# Patient Record
Sex: MALE | Race: WHITE | NOT HISPANIC OR LATINO | Employment: FULL TIME | URBAN - METROPOLITAN AREA
[De-identification: names, ages, dates, MRNs, and addresses within clinical notes are randomized per-mention and may not be internally consistent; named-entity substitution may affect disease eponyms.]

---

## 2017-03-12 DIAGNOSIS — F33.0 MAJOR DEPRESSIVE DISORDER, RECURRENT EPISODE, MILD (H): ICD-10-CM

## 2017-03-14 DIAGNOSIS — F33.0 MAJOR DEPRESSIVE DISORDER, RECURRENT EPISODE, MILD (H): ICD-10-CM

## 2017-03-14 NOTE — TELEPHONE ENCOUNTER
Request for medication refill:    Date of last visit at clinic: 2/25/16    Please complete refill if appropriate and CLOSE ENCOUNTER.    Closing the encounter signifies the refill is complete.    If refill has been denied, please complete the smart phrase .smirefuse and route it to the [facility] RN TRIAGE pool to inform the patient and the pharmacy.    Elo Dickson

## 2017-04-12 ENCOUNTER — OFFICE VISIT (OUTPATIENT)
Dept: FAMILY MEDICINE | Facility: CLINIC | Age: 52
End: 2017-04-12

## 2017-04-12 VITALS
OXYGEN SATURATION: 97 % | DIASTOLIC BLOOD PRESSURE: 80 MMHG | BODY MASS INDEX: 24.04 KG/M2 | HEIGHT: 67 IN | WEIGHT: 153.2 LBS | TEMPERATURE: 97.9 F | HEART RATE: 91 BPM | RESPIRATION RATE: 18 BRPM | SYSTOLIC BLOOD PRESSURE: 122 MMHG

## 2017-04-12 DIAGNOSIS — F33.0 MAJOR DEPRESSIVE DISORDER, RECURRENT EPISODE, MILD (H): ICD-10-CM

## 2017-04-12 ASSESSMENT — ENCOUNTER SYMPTOMS
RESPIRATORY NEGATIVE: 1
CARDIOVASCULAR NEGATIVE: 1
DYSPHORIC MOOD: 0

## 2017-04-12 NOTE — PROGRESS NOTES
"      HPI:       Donn Baptiste is a 51 year old who presents for the following  Patient presents with:  Refill Request: Zoloft    F/U chronic depression.  He has had an excellent response to sertraline.  Recently his primary MD refilled his Rx but it was sent to his old pharmacy.  Reports stable mood and no side effects.         Problem, Medication and Allergy Lists were reviewed and are current.  Patient is an established patient of this clinic.         Review of Systems:   Review of Systems   Respiratory: Negative.    Cardiovascular: Negative.    Psychiatric/Behavioral: Negative for dysphoric mood.             Physical Exam:   Patient Vitals for the past 24 hrs:   BP Temp Temp src Pulse Resp SpO2 Height Weight   04/12/17 1601 122/80 97.9  F (36.6  C) Oral 91 18 97 % 5' 7\" (170.2 cm) 153 lb 3.2 oz (69.5 kg)     Body mass index is 23.99 kg/(m^2).  Vitals were reviewed and were normal     Physical Exam   Constitutional: He appears well-developed and well-nourished. No distress.   Psychiatric: He has a normal mood and affect.         Results:       Assessment and Plan     Donn was seen today for refill request.    Diagnoses and all orders for this visit:    Major depressive disorder, recurrent episode, mild (H) - doing well.  He has switched pharmacies and a new Rx was sent to his new pharmacy.  F/U as directed by his primary MD.  -     sertraline (ZOLOFT) 50 MG tablet; Take 1 tablet (50 mg) by mouth daily    Family hx of colon ca - he hasn't had a colonoscopy.  He will schedule one.    Medications Discontinued During This Encounter   Medication Reason     sertraline (ZOLOFT) 50 MG tablet Reorder         Options for treatment and follow-up care were reviewed with the patient. Donn Baptiste  engaged in the decision making process and verbalized understanding of the options discussed and agreed with the final plan.    Nam Mantilla MD    "

## 2017-04-12 NOTE — MR AVS SNAPSHOT
After Visit Summary   4/12/2017    Donn Baptiste    MRN: [id number]           Patient Information     Date Of Birth          1965        Visit Information        Provider Department      4/12/2017 4:00 PM Nam Mantilla MD [facility]        Today's Diagnoses     Major depressive disorder, recurrent episode, mild (H)          Care Instructions    Continue sertraline as before.    Schedule colonoscopy.        Follow-ups after your visit        Who to contact     Please call your clinic at [phone] to:    Ask questions about your health    Make or cancel appointments    Discuss your medicines    Learn about your test results    Speak to your doctor   If you have compliments or concerns about an experience at your clinic, or if you wish to file a complaint, please contact [facility] Patient Relations at [phone] or email us at [email or web address]         Additional Information About Your Visit        MyChart Information     DriveABLE Assessment Centrest gives you secure access to your electronic health record. If you see a primary care provider, you can also send messages to your care team and make appointments. If you have questions, please call your primary care clinic.  If you do not have a primary care provider, please call [phone] and they will assist you.      MTX Connect is an electronic gateway that provides easy, online access to your medical records. With MTX Connect, you can request a clinic appointment, read your test results, renew a prescription or communicate with your care team.     To access your existing account, please contact your [facility] or call [phone] for assistance.        Care EveryWhere ID     This is your Care EveryWhere ID. This could be used by other organizations to access your [place] medical records  [id number]        Your Vitals Were     Pulse Temperature Respirations Height  "Pulse Oximetry BMI (Body Mass Index)    91 97.9  F (36.6  C) (Oral) 18 5' 7\" (170.2 cm) 97% 23.99 kg/m2       Blood Pressure from Last 3 Encounters:   04/12/17 122/80   02/25/16 110/78   04/27/15 116/78    Weight from Last 3 Encounters:   04/12/17 153 lb 3.2 oz (69.5 kg)   02/25/16 147 lb 3.2 oz (66.8 kg)   04/27/15 165 lb 12.8 oz (75.2 kg)              Today, you had the following     No orders found for display         Where to get your medicines      These medications were sent to [facility] [address] [place] [address] AT [facility] & [address] [place] [id number]    Hours:  24-hours Phone:  [phone]     sertraline 50 MG tablet          Primary Care Provider Office Phone # Fax #    Sheila Mantilla [phone]       [facility] [address] [address]        Thank you!     Thank you for choosing [facility]  for your care. Our goal is always to provide you with excellent care. Hearing back from our patients is one way we can continue to improve our services. Please take a few minutes to complete the written survey that you may receive in the mail after your visit with us. Thank you!             Your Updated Medication List - Protect others around you: Learn how to safely use, store and throw away your medicines at www.disposemymeds.org.          This list is accurate as of: 4/12/17  4:16 PM.  Always use your most recent med list.                   Brand Name Dispense Instructions for use    cetirizine 10 MG tablet    zyrTEC    90 tablet    Take 1 tablet (10 mg) by mouth daily       fluticasone 50 MCG/ACT spray    FLONASE    16 g    Spray 1-2 sprays into both nostrils daily       sertraline 50 MG tablet    ZOLOFT    90 tablet    Take 1 tablet (50 mg) by mouth daily         "

## 2017-05-02 ENCOUNTER — ALLIED HEALTH/NURSE VISIT (OUTPATIENT)
Dept: FAMILY MEDICINE | Facility: CLINIC | Age: 52
End: 2017-05-02

## 2017-05-02 DIAGNOSIS — Z11.1 SCREENING EXAMINATION FOR PULMONARY TUBERCULOSIS: Primary | ICD-10-CM

## 2017-05-02 NOTE — MR AVS SNAPSHOT
After Visit Summary   5/2/2017    Donn Baptiste    MRN: [id number]           Patient Information     Date Of Birth          1965        Visit Information        Provider Department      5/2/2017 4:20 PM Nurse, [facility]        Today's Diagnoses     Screening examination for pulmonary tuberculosis    -  1       Follow-ups after your visit        Who to contact     Please call your clinic at [phone] to:    Ask questions about your health    Make or cancel appointments    Discuss your medicines    Learn about your test results    Speak to your doctor   If you have compliments or concerns about an experience at your clinic, or if you wish to file a complaint, please contact [facility] Patient Relations at [phone] or email us at [email or web address]         Additional Information About Your Visit        MyChart Information     "[employer]t gives you secure access to your electronic health record. If you see a primary care provider, you can also send messages to your care team and make appointments. If you have questions, please call your primary care clinic.  If you do not have a primary care provider, please call [phone] and they will assist you.      Edaytown is an electronic gateway that provides easy, online access to your medical records. With Edaytown, you can request a clinic appointment, read your test results, renew a prescription or communicate with your care team.     To access your existing account, please contact your [facility] or call [phone] for assistance.        Care EveryWhere ID     This is your Care EveryWhere ID. This could be used by other organizations to access your [place] medical records  [id number]         Blood Pressure from Last 3 Encounters:   04/12/17 122/80   02/25/16 110/78   04/27/15 116/78    Weight from Last 3 Encounters:   04/12/17 153 lb  3.2 oz (69.5 kg)   02/25/16 147 lb 3.2 oz (66.8 kg)   04/27/15 165 lb 12.8 oz (75.2 kg)              We Performed the Following     tuberculin (Mantoux, PPD) 5 UNIT/0.1ML ID injection (Charge)        Primary Care Provider Office Phone # Fax #    Sheila Mantilla [phone]       [facility] [address] [address]        Thank you!     Thank you for choosing [facility]  for your care. Our goal is always to provide you with excellent care. Hearing back from our patients is one way we can continue to improve our services. Please take a few minutes to complete the written survey that you may receive in the mail after your visit with us. Thank you!             Your Updated Medication List - Protect others around you: Learn how to safely use, store and throw away your medicines at www.disposemymeds.org.          This list is accurate as of: 5/2/17 11:59 PM.  Always use your most recent med list.                   Brand Name Dispense Instructions for use    cetirizine 10 MG tablet    zyrTEC    90 tablet    Take 1 tablet (10 mg) by mouth daily       fluticasone 50 MCG/ACT spray    FLONASE    16 g    Spray 1-2 sprays into both nostrils daily       sertraline 50 MG tablet    ZOLOFT    90 tablet    Take 1 tablet (50 mg) by mouth daily

## 2017-05-05 ENCOUNTER — ALLIED HEALTH/NURSE VISIT (OUTPATIENT)
Dept: FAMILY MEDICINE | Facility: CLINIC | Age: 52
End: 2017-05-05

## 2017-05-05 DIAGNOSIS — Z11.1 SCREENING EXAMINATION FOR PULMONARY TUBERCULOSIS: Primary | ICD-10-CM

## 2017-05-05 NOTE — NURSING NOTE
RN was referred to patient by MA. RN read site of placement with negative result. Provided letter to patient stating date of placement, date read and result.    Maribeth Cotton RN

## 2017-05-05 NOTE — LETTER
Donn Baptiste  [address]  [facility] [address]    5/5/2017    Donn Baptiste had a mantoux placed in his left arm in our clinic on 5/2/17. It was read today, 5/5/17, with a negative result, read by myself.        Maribeth Cotton RN

## 2017-05-05 NOTE — NURSING NOTE
Pt arrived to clinic to get a mantoux reading today. This writer noticed no documentation of the administration noted 5/2/17.There was a scheduled nurse visit for 5/2/17 with a check in for 4:16 pm. This was flagged for the MOD.   The patient explained the location of the injection and the date and time for the reading was today. This writer read the area shown by patient and there was 0 duration noted on the left forearm.  This was handed over to the RN and MOD for follow up.  Elo Dickson CMA

## 2017-05-05 NOTE — MR AVS SNAPSHOT
After Visit Summary   5/5/2017    Donn Baptiste    MRN: [id number]           Patient Information     Date Of Birth          1965        Visit Information        Provider Department      5/5/2017 3:20 PM Nurse, [facility]        Today's Diagnoses     Screening examination for pulmonary tuberculosis    -  1       Follow-ups after your visit        Who to contact     Please call your clinic at [phone] to:    Ask questions about your health    Make or cancel appointments    Discuss your medicines    Learn about your test results    Speak to your doctor   If you have compliments or concerns about an experience at your clinic, or if you wish to file a complaint, please contact [facility] Patient Relations at [phone] or email us at [email or web address]         Additional Information About Your Visit        MyChart Information     Combined Powert gives you secure access to your electronic health record. If you see a primary care provider, you can also send messages to your care team and make appointments. If you have questions, please call your primary care clinic.  If you do not have a primary care provider, please call [phone] and they will assist you.      Warp 9 is an electronic gateway that provides easy, online access to your medical records. With Warp 9, you can request a clinic appointment, read your test results, renew a prescription or communicate with your care team.     To access your existing account, please contact your [facility] or call [phone] for assistance.        Care EveryWhere ID     This is your Care EveryWhere ID. This could be used by other organizations to access your [place] medical records  [id number]         Blood Pressure from Last 3 Encounters:   04/12/17 122/80   02/25/16 110/78   04/27/15 116/78    Weight from Last 3 Encounters:   04/12/17 153 lb  3.2 oz (69.5 kg)   02/25/16 147 lb 3.2 oz (66.8 kg)   04/27/15 165 lb 12.8 oz (75.2 kg)              Today, you had the following     No orders found for display       Primary Care Provider Office Phone # Fax #    Sheila Mantilla [phone]       [facility] [address] [address]        Thank you!     Thank you for choosing [facility]  for your care. Our goal is always to provide you with excellent care. Hearing back from our patients is one way we can continue to improve our services. Please take a few minutes to complete the written survey that you may receive in the mail after your visit with us. Thank you!             Your Updated Medication List - Protect others around you: Learn how to safely use, store and throw away your medicines at www.disposemymeds.org.          This list is accurate as of: 5/5/17 11:59 PM.  Always use your most recent med list.                   Brand Name Dispense Instructions for use    cetirizine 10 MG tablet    zyrTEC    90 tablet    Take 1 tablet (10 mg) by mouth daily       fluticasone 50 MCG/ACT spray    FLONASE    16 g    Spray 1-2 sprays into both nostrils daily       sertraline 50 MG tablet    ZOLOFT    90 tablet    Take 1 tablet (50 mg) by mouth daily

## 2017-05-09 NOTE — NURSING NOTE
Mantoux/PPD screening questions    1. Have you had recent contact with a person with active Tuberculosis (TB)? NO  2. Have you had this type of test before? No  3. Have you had a live vaccine (Smallpox, Flumist, MMR, Varicella, Oral Polio, or Yellow Fever) in the past 4 weeks? NO  4. Have you ever received the Bacillus Calmette-Saulo (BCG) vaccine for Tuberculosis? NO  5. Have you ever been treated for Tuberculosis before? NO    Patient is eligible for a PPD test.  I placed the PPD on the left forearm.  I advised patient to avoid scratching the area and to return to the clinic in 48-72 hours for interpretation. Dr. Gongora was onsite at the time of placement.    Padma Moran CMA      Placed on 5/2/17  Time: 3:42P.M

## 2017-06-11 ENCOUNTER — OFFICE VISIT (OUTPATIENT)
Dept: URGENT CARE | Facility: URGENT CARE | Age: 52
End: 2017-06-11
Payer: COMMERCIAL

## 2017-06-11 VITALS
WEIGHT: 154 LBS | BODY MASS INDEX: 23.34 KG/M2 | OXYGEN SATURATION: 97 % | SYSTOLIC BLOOD PRESSURE: 112 MMHG | TEMPERATURE: 98.2 F | HEIGHT: 68 IN | DIASTOLIC BLOOD PRESSURE: 67 MMHG | HEART RATE: 90 BPM

## 2017-06-11 DIAGNOSIS — H66.002 ACUTE SUPPURATIVE OTITIS MEDIA OF LEFT EAR WITHOUT SPONTANEOUS RUPTURE OF TYMPANIC MEMBRANE, RECURRENCE NOT SPECIFIED: Primary | ICD-10-CM

## 2017-06-11 DIAGNOSIS — H92.02 EARACHE ON LEFT: ICD-10-CM

## 2017-06-11 DIAGNOSIS — H61.23 BILATERAL IMPACTED CERUMEN: ICD-10-CM

## 2017-06-11 DIAGNOSIS — J39.2 THROAT IRRITATION: ICD-10-CM

## 2017-06-11 LAB
DEPRECATED S PYO AG THROAT QL EIA: NORMAL
MICRO REPORT STATUS: NORMAL
SPECIMEN SOURCE: NORMAL

## 2017-06-11 PROCEDURE — 99213 OFFICE O/P EST LOW 20 MIN: CPT | Performed by: FAMILY MEDICINE

## 2017-06-11 PROCEDURE — 87880 STREP A ASSAY W/OPTIC: CPT | Performed by: FAMILY MEDICINE

## 2017-06-11 PROCEDURE — 87081 CULTURE SCREEN ONLY: CPT | Performed by: FAMILY MEDICINE

## 2017-06-11 RX ORDER — AMOXICILLIN 875 MG
875 TABLET ORAL 2 TIMES DAILY
Qty: 20 TABLET | Refills: 0 | Status: SHIPPED | OUTPATIENT
Start: 2017-06-11 | End: 2017-06-21

## 2017-06-11 NOTE — NURSING NOTE
"Chief Complaint   Patient presents with     Urgent Care     Pt in clinic c/o gland and ear pain.     Gland     Otalgia       Initial /67  Pulse 90  Temp 98.2  F (36.8  C) (Tympanic)  Ht 5' 8\" (1.727 m)  Wt 154 lb (69.9 kg)  SpO2 97%  BMI 23.42 kg/m2 Estimated body mass index is 23.42 kg/(m^2) as calculated from the following:    Height as of this encounter: 5' 8\" (1.727 m).    Weight as of this encounter: 154 lb (69.9 kg).  Medication Reconciliation: complete   Trudy Waters/ MA    "

## 2017-06-11 NOTE — MR AVS SNAPSHOT
After Visit Summary   6/11/2017    Donn Baptiste    MRN: [id number]           Patient Information     Date Of Birth          1965        Visit Information        Provider Department      6/11/2017 1:20 PM Sophia Valle DO [facility]        Today's Diagnoses     Acute suppurative otitis media of left ear without spontaneous rupture of tympanic membrane, recurrence not specified    -  1    Throat irritation        Earache on left        Bilateral impacted cerumen           Follow-ups after your visit        Who to contact     If you have questions or need follow up information about today's clinic visit or your schedule please contact [facility] directly at [phone].  Normal or non-critical lab and imaging results will be communicated to you by AppLearnhart, letter or phone within 4 business days after the clinic has received the results. If you do not hear from us within 7 days, please contact the clinic through AppLearnhart or phone. If you have a critical or abnormal lab result, we will notify you by phone as soon as possible.  Submit refill requests through iSTAR or call your pharmacy and they will forward the refill request to us. Please allow 3 business days for your refill to be completed.          Additional Information About Your Visit        MyChart Information     iSTAR gives you secure access to your electronic health record. If you see a primary care provider, you can also send messages to your care team and make appointments. If you have questions, please call your primary care clinic.  If you do not have a primary care provider, please call [phone] and they will assist you.        Care EveryWhere ID     This is your Care EveryWhere ID. This could be used by other organizations to access your [place] medical records  [id number]        Your Vitals Were     Pulse Temperature Height Pulse Oximetry BMI (Body Mass Index)  "      90 98.2  F (36.8  C) (Tympanic) 5' 8\" (1.727 m) 97% 23.42 kg/m2        Blood Pressure from Last 3 Encounters:   06/11/17 112/67   04/12/17 122/80   02/25/16 110/78    Weight from Last 3 Encounters:   06/11/17 154 lb (69.9 kg)   04/12/17 153 lb 3.2 oz (69.5 kg)   02/25/16 147 lb 3.2 oz (66.8 kg)              We Performed the Following     Beta strep group A culture     Strep, Rapid Screen          Today's Medication Changes          These changes are accurate as of: 6/11/17  2:15 PM.  If you have any questions, ask your nurse or doctor.               Start taking these medicines.        Dose/Directions    amoxicillin 875 MG tablet   Commonly known as:  AMOXIL   Used for:  Acute suppurative otitis media of left ear without spontaneous rupture of tympanic membrane, recurrence not specified   Started by:  Sophia Valle,         Dose:  875 mg   Take 1 tablet (875 mg) by mouth 2 times daily for 10 days   Quantity:  20 tablet   Refills:  0            Where to get your medicines      These medications were sent to [facility] [address] [place] [address] AT [address] [address]    Hours:  24-hours Phone:  [phone]     amoxicillin 875 MG tablet                Primary Care Provider Office Phone # Fax #    Sheila Mantilla [phone]       [facility] [address] [address]        Thank you!     Thank you for choosing [facility]  for your care. Our goal is always to provide you with excellent care. Hearing back from our patients is one way we can continue to improve our services. Please take a few minutes to complete the written survey that you may receive in the mail after your visit with us. Thank you!             Your Updated Medication List - Protect others around you: Learn how to safely use, store and throw away your medicines at www.disposemymeds.org.    "       This list is accurate as of: 6/11/17  2:15 PM.  Always use your most recent med list.                   Brand Name Dispense Instructions for use    amoxicillin 875 MG tablet    AMOXIL    20 tablet    Take 1 tablet (875 mg) by mouth 2 times daily for 10 days       cetirizine 10 MG tablet    zyrTEC    90 tablet    Take 1 tablet (10 mg) by mouth daily       fluticasone 50 MCG/ACT spray    FLONASE    16 g    Spray 1-2 sprays into both nostrils daily       IBUPROFEN PO          sertraline 50 MG tablet    ZOLOFT    90 tablet    Take 1 tablet (50 mg) by mouth daily

## 2017-06-11 NOTE — PROGRESS NOTES
"SUBJECTIVE:   Donn Baptiste is a 51 year old male presenting with a chief complaint of feeling of left upper neck gland swelling, pain radiating to the left ear.  Symptoms started yesterday.   Was just on a flight.   H/o seasonal allergies, minor symptoms the past month.   Not taking anything for allergies.  Did take ibuprofen for discomfort yesterday which did help.          ROS:  5-Point Review of Systems Negative-- Except as stated above.    OBJECTIVE  /67  Pulse 90  Temp 98.2  F (36.8  C) (Tympanic)  Ht 5' 8\" (1.727 m)  Wt 154 lb (69.9 kg)  SpO2 97%  BMI 23.42 kg/m2  GENERAL:  Awake, alert and interactive. No acute distress.  HEENT:   NC/AT, EOMI, clear conjunctiva.  Nose clear.  Oropharynx with mild diffuse erythema, moist and clear.  TM's both blocked by wax impaction.  After irrigation: both EAC's benign.  TM left retracted and erythematous, TM right dull.   NECK: supple and free of adenopathy.  Mildly tender over upper anterior cervical chain, on left.  CHEST:  Lungs are clear, no rhonchi, wheezing or rales. Normal symmetric air entry throughout both lung fields.   HEART:  S1 and S2 normal, no murmurs, clicks, gallops or rubs. Regular rate and rhythm.    Results for orders placed or performed in visit on 06/11/17   Strep, Rapid Screen   Result Value Ref Range    Specimen Description Throat     Rapid Strep A Screen       NEGATIVE: No Group A streptococcal antigen detected by immunoassay, await   culture report.      Micro Report Status FINAL 06/11/2017          ASSESSMENT/PLAN    ICD-10-CM    1. Acute suppurative otitis media of left ear without spontaneous rupture of tympanic membrane, recurrence not specified H66.002 amoxicillin (AMOXIL) 875 MG tablet   2. Throat irritation J39.2 Strep, Rapid Screen     Beta strep group A culture   3. Earache on left H92.02    4. Bilateral impacted cerumen H61.23       We discussed the expected course and symptomatic cares in detail, including return to " care if symptoms not improving as expected, do not resolve completely, or if any new or worsening symptoms develop.

## 2017-06-12 LAB
BACTERIA SPEC CULT: NORMAL
MICRO REPORT STATUS: NORMAL
SPECIMEN SOURCE: NORMAL

## 2017-11-07 DIAGNOSIS — Z80.0 FAMILY HISTORY OF COLON CANCER: ICD-10-CM

## 2017-11-07 DIAGNOSIS — Z13.9 SCREENING FOR CONDITION: Primary | ICD-10-CM

## 2017-11-27 ENCOUNTER — TELEPHONE (OUTPATIENT)
Dept: GASTROENTEROLOGY | Facility: OUTPATIENT CENTER | Age: 52
End: 2017-11-27

## 2017-11-29 ENCOUNTER — TELEPHONE (OUTPATIENT)
Dept: GASTROENTEROLOGY | Facility: OUTPATIENT CENTER | Age: 52
End: 2017-11-29

## 2017-11-29 NOTE — TELEPHONE ENCOUNTER
Patient taking any blood thinners ? no    Heart disease ? denies    Lung disease ? denies      Sleep apnea ? denies    Diabetic ? denies    Kidney disease ? denies    Dialysis ? n/a    Electronic implanted medical devices ? denies    Are you taking any narcotic pain medication ? no  What is your daily dosage ?    PTSD ? n/a    Prep instructions reviewed with patient ? Instructions,  policy, MAC sedation plan reviewed. Advised patient to have someone stay with him post exam    Pharmacy : n/a    Indication for procedure :   Screening for condition [Z13.9]  - Primary          Family history of colon cancer [Z80.0]             Referring provider :Sheila Mantilla MD

## 2017-12-04 ENCOUNTER — TRANSFERRED RECORDS (OUTPATIENT)
Dept: HEALTH INFORMATION MANAGEMENT | Facility: CLINIC | Age: 52
End: 2017-12-04

## 2017-12-04 ENCOUNTER — DOCUMENTATION ONLY (OUTPATIENT)
Dept: GASTROENTEROLOGY | Facility: OUTPATIENT CENTER | Age: 52
End: 2017-12-04

## 2017-12-04 DIAGNOSIS — Z80.0 FAMILY HISTORY OF COLON CANCER: Primary | ICD-10-CM

## 2018-01-02 ENCOUNTER — OFFICE VISIT (OUTPATIENT)
Dept: FAMILY MEDICINE | Facility: CLINIC | Age: 53
End: 2018-01-02
Payer: COMMERCIAL

## 2018-01-02 VITALS
TEMPERATURE: 97.8 F | OXYGEN SATURATION: 97 % | DIASTOLIC BLOOD PRESSURE: 78 MMHG | WEIGHT: 158.6 LBS | RESPIRATION RATE: 18 BRPM | BODY MASS INDEX: 24.12 KG/M2 | HEART RATE: 81 BPM | SYSTOLIC BLOOD PRESSURE: 117 MMHG

## 2018-01-02 DIAGNOSIS — L50.9 URTICARIA: ICD-10-CM

## 2018-01-02 DIAGNOSIS — L27.0 DRUG ERUPTION: Primary | ICD-10-CM

## 2018-01-02 RX ORDER — HYDROXYZINE HYDROCHLORIDE 25 MG/1
25-50 TABLET, FILM COATED ORAL EVERY 6 HOURS PRN
Qty: 60 TABLET | Refills: 0 | Status: SHIPPED | OUTPATIENT
Start: 2018-01-02 | End: 2018-05-31

## 2018-01-02 RX ORDER — TRIAMCINOLONE ACETONIDE 1 MG/G
OINTMENT TOPICAL
Qty: 30 G | Refills: 0 | Status: SHIPPED | OUTPATIENT
Start: 2018-01-02 | End: 2018-05-31

## 2018-01-02 ASSESSMENT — ANXIETY QUESTIONNAIRES
5. BEING SO RESTLESS THAT IT IS HARD TO SIT STILL: NOT AT ALL
2. NOT BEING ABLE TO STOP OR CONTROL WORRYING: NOT AT ALL
IF YOU CHECKED OFF ANY PROBLEMS ON THIS QUESTIONNAIRE, HOW DIFFICULT HAVE THESE PROBLEMS MADE IT FOR YOU TO DO YOUR WORK, TAKE CARE OF THINGS AT HOME, OR GET ALONG WITH OTHER PEOPLE: SOMEWHAT DIFFICULT
1. FEELING NERVOUS, ANXIOUS, OR ON EDGE: SEVERAL DAYS
GAD7 TOTAL SCORE: 3
6. BECOMING EASILY ANNOYED OR IRRITABLE: SEVERAL DAYS
7. FEELING AFRAID AS IF SOMETHING AWFUL MIGHT HAPPEN: NOT AT ALL
3. WORRYING TOO MUCH ABOUT DIFFERENT THINGS: NOT AT ALL

## 2018-01-02 ASSESSMENT — PATIENT HEALTH QUESTIONNAIRE - PHQ9
5. POOR APPETITE OR OVEREATING: SEVERAL DAYS
SUM OF ALL RESPONSES TO PHQ QUESTIONS 1-9: 7

## 2018-01-02 NOTE — MR AVS SNAPSHOT
After Visit Summary   1/2/2018    Donn Baptiste    MRN: [id number]           Patient Information     Date Of Birth          1965        Visit Information        Provider Department      1/2/2018 4:00 PM Hilda Lopez APRN CNP [facility]        Today's Diagnoses     Drug eruption    -  1    Urticaria          Care Instructions    Here is the plan from today's visit    1. Drug eruption    2. Urticaria  - hydrOXYzine (ATARAX) 25 MG tablet; Take 1-2 tablets (25-50 mg) by mouth every 6 hours as needed for itching  Dispense: 60 tablet; Refill: 0  - triamcinolone (KENALOG) 0.1 % ointment; Apply sparingly to affected area three times daily as needed for itching  Dispense: 30 g; Refill: 0      Follow-up with no improvement or worsening of symptoms.     Thank you for coming to [facility] today.  Lab Testing:  **If you had lab testing today and your results are reassuring or normal they will be mailed to you or sent through [employer] within 7 days.   **If the lab tests need quick action we will call you with the results.  The phone number we will call with results is # [phone] (home) [phone] (work). If this is not the best number please call our clinic and change the number.  Medication Refills:  If you need any refills please call your pharmacy and they will contact us.   If you need to  your refill at a new pharmacy, please contact the new pharmacy directly. The new pharmacy will help you get your medications transferred faster.   Scheduling:  If you have any concerns about today's visit or wish to schedule another appointment please call our office during normal business hours [phone] (8-5:00 M-F)  If a referral was made to a [facility] and you don't get a call from central scheduling please call [phone].  If a Mammogram was ordered for you at [facility] call [phone] to schedule or change your  appointment.  If you had an XRay/CT/Ultrasound/MRI ordered the number is [phone] to schedule or change your radiology appointment.   Medical Concerns:  If you have urgent medical concerns please call [phone] at any time of the day.  If you have a medical emergency please call 911.            Follow-ups after your visit        Your next 10 appointments already scheduled     Feb 02, 2018 11:15 AM CST   (Arrive by 11:00 AM)   NEW WITH ROOM with Maria R Theodore GC,  2 116 CONSULT RM   [facility] ([facility])    [address] [place] [address] [phone]            Feb 02, 2018 12:30 PM CST   [facility] Draw with [facility] LAB DRAW   [facility] ([facility])    [address] [place] [address] [phone]              Who to contact     Please call your clinic at [phone] to:    Ask questions about your health    Make or cancel appointments    Discuss your medicines    Learn about your test results    Speak to your doctor   If you have compliments or concerns about an experience at your clinic, or if you wish to file a complaint, please contact [facility] Patient Relations at [phone] or email us at [email or web address]         Additional Information About Your Visit        [employer] Information     [employer] gives you secure access to your electronic health record. If you see a primary care provider, you can also send messages to your care team and make appointments. If you have questions, please call your primary care clinic.  If you do not have a primary care provider, please call [phone] and they will assist you.      [employer] is an electronic gateway that provides easy, online access to your medical records. With [employer], you can request a clinic appointment, read your test results, renew a prescription or  communicate with your care team.     To access your existing account, please contact your [facility] or call [phone] for assistance.        Care EveryWhere ID     This is your Care EveryWhere ID. This could be used by other organizations to access your [facility] medical records  [id number]        Your Vitals Were     Pulse Temperature Respirations Pulse Oximetry BMI (Body Mass Index)       81 97.8  F (36.6  C) 18 97% 24.12 kg/m2        Blood Pressure from Last 3 Encounters:   01/02/18 117/78   06/11/17 112/67   04/12/17 122/80    Weight from Last 3 Encounters:   01/02/18 158 lb 9.6 oz (71.9 kg)   06/11/17 154 lb (69.9 kg)   04/12/17 153 lb 3.2 oz (69.5 kg)              Today, you had the following     No orders found for display         Today's Medication Changes          These changes are accurate as of: 1/2/18  4:20 PM.  If you have any questions, ask your nurse or doctor.               Start taking these medicines.        Dose/Directions    hydrOXYzine 25 MG tablet   Commonly known as:  ATARAX   Used for:  Urticaria   Started by:  Hilda Lopez APRN CNP        Dose:  25-50 mg   Take 1-2 tablets (25-50 mg) by mouth every 6 hours as needed for itching   Quantity:  60 tablet   Refills:  0       triamcinolone 0.1 % ointment   Commonly known as:  KENALOG   Used for:  Urticaria   Started by:  Hilda Lopez APRN CNP        Apply sparingly to affected area three times daily as needed for itching   Quantity:  30 g   Refills:  0            Where to get your medicines      These medications were sent to [facility] [id number] [place] [address] [address] [facility] [address]  [address] [place] [address]     Phone:  [phone]     hydrOXYzine 25 MG tablet    triamcinolone 0.1 % ointment                Primary Care Provider Office Phone # Fax #    Sheila Mantilla [phone]       [address]  [address]  [facility] [address]        Equal Access to Services     CEDRICK BRAVO : Hadii aad ku hadaiyanakyle Centenomikaelaali, waclarkda sharirdha, qajaniceta chantalchetgabe nunez. So [facility] [phone].    ATENCIÓN: Si habla español, tiene a parkinson disposición servicios gratuitos de asistencia lingüística. [facility] [phone].    We comply with applicable federal civil rights laws and [place] laws. We do not discriminate on the basis of race, color, national origin, age, disability, sex, sexual orientation, or gender identity.            Thank you!     [facility] you [facility]  for your care. Our goal is always to provide you with excellent care. Hearing back from our patients is one way we can continue to improve our services. Please take a few minutes to complete the written survey that you may receive in the mail after your visit with us. Thank you!             Your Updated Medication List - Protect others around you: Learn how to safely use, store and throw away your medicines at www.disposemymeds.org.          This list is accurate as of: 1/2/18  4:20 PM.  Always use your most recent med list.                   Brand Name Dispense Instructions for use Diagnosis    cetirizine 10 MG tablet    zyrTEC    90 tablet    Take 1 tablet (10 mg) by mouth daily    Sinus congestion       fluticasone 50 MCG/ACT spray    FLONASE    16 g    Spray 1-2 sprays into both nostrils daily    Sinus congestion       hydrOXYzine 25 MG tablet    ATARAX    60 tablet    Take 1-2 tablets (25-50 mg) by mouth every 6 hours as needed for itching    Urticaria       IBUPROFEN PO           sertraline 50 MG tablet    ZOLOFT    90 tablet    Take 1 tablet (50 mg) by mouth daily    Major depressive disorder, recurrent episode, mild (H)       triamcinolone 0.1 % ointment    KENALOG    30 g    Apply sparingly to affected area three times daily as needed for itching    Urticaria

## 2018-01-02 NOTE — PATIENT INSTRUCTIONS
Here is the plan from today's visit    1. Drug eruption    2. Urticaria  - hydrOXYzine (ATARAX) 25 MG tablet; Take 1-2 tablets (25-50 mg) by mouth every 6 hours as needed for itching  Dispense: 60 tablet; Refill: 0  - triamcinolone (KENALOG) 0.1 % ointment; Apply sparingly to affected area three times daily as needed for itching  Dispense: 30 g; Refill: 0      Follow-up with no improvement or worsening of symptoms.     Thank you for coming to [facility] today.  Lab Testing:  **If you had lab testing today and your results are reassuring or normal they will be mailed to you or sent through [employer] within 7 days.   **If the lab tests need quick action we will call you with the results.  The phone number we will call with results is # [phone] (home) [phone] (work). If this is not the best number please call our clinic and change the number.  Medication Refills:  If you need any refills please call your pharmacy and they will contact us.   If you need to  your refill at a new pharmacy, please contact the new pharmacy directly. The new pharmacy will help you get your medications transferred faster.   Scheduling:  If you have any concerns about today's visit or wish to schedule another appointment please call our office during normal business hours [phone] (8-5:00 M-F)  If a referral was made to a [facility] and you don't get a call from central scheduling please call [phone].  If a Mammogram was ordered for you at [facility] call [phone] to schedule or change your appointment.  If you had an XRay/CT/Ultrasound/MRI ordered the number is [phone] to schedule or change your radiology appointment.   Medical Concerns:  If you have urgent medical concerns please call [phone] at any time of the day.  If you have a medical emergency please call [phone].

## 2018-01-02 NOTE — PROGRESS NOTES
HPI:       Donn Baptiste is a 52 year old who presents for the following  Patient presents with:  Hives: upper back and shoulders for 1 week    Patient states that two weeks ago went to dentist and was scheduling a root canal - Was treated for amoxicillin due to dental infection. Antibiotic was changed to clindamycin.   Had associated URI symptoms.  Had hives on upper back and shoulders.  Hives intermittently for the past 1.5 weeks, worsening of hives in the past two days.     Has been taking Benadryl cream, PO Benadryl and Zyrtec as needed. Difficulty with sleeping at night due to itching and stinging sensation.       No known history of allergies.     +Seasonal allergies, uses Zyrtec as needed.        Problem, Medication and Allergy Lists were   reviewed and are current.     Patient Active Problem List    Diagnosis Date Noted     Depression 04/27/2015     Priority: High     Class: Chronic     Major depressive disorder, recurrent episode, mild (H) 02/25/2016     Priority: Medium     Family history of colon cancer 02/25/2016     Priority: Medium     Seasonal allergic rhinitis 04/28/2015     Priority: Low     Class: Chronic   ,     Current Outpatient Prescriptions   Medication Sig Dispense Refill     hydrOXYzine (ATARAX) 25 MG tablet Take 1-2 tablets (25-50 mg) by mouth every 6 hours as needed for itching 60 tablet 0     triamcinolone (KENALOG) 0.1 % ointment Apply sparingly to affected area three times daily as needed for itching 30 g 0     IBUPROFEN PO        sertraline (ZOLOFT) 50 MG tablet Take 1 tablet (50 mg) by mouth daily 90 tablet 3     fluticasone (FLONASE) 50 MCG/ACT nasal spray Spray 1-2 sprays into both nostrils daily (Patient not taking: Reported on 6/11/2017) 16 g 3     cetirizine (ZYRTEC) 10 MG tablet Take 1 tablet (10 mg) by mouth daily 90 tablet 3   ,     Allergies   Allergen Reactions     Amoxicillin Rash     Urticarial reaction     Patient is an established patient of this clinic.          Review of Systems:   Review of Systems   Constitutional: Negative.    HENT: Negative.    Respiratory: Negative.    Cardiovascular: Negative.    Skin: Positive for rash.        See HPI             Physical Exam:   Patient Vitals for the past 24 hrs:   BP Temp Pulse Resp SpO2 Weight   01/02/18 1602 117/78 97.8  F (36.6  C) 81 18 97 % 158 lb 9.6 oz (71.9 kg)     Body mass index is 24.12 kg/(m^2).  Vitals were reviewed and were normal     Physical Exam   Constitutional: He is oriented to person, place, and time. He appears well-developed and well-nourished. No distress.   HENT:   Mouth/Throat: Oropharynx is clear and moist.   Cardiovascular: Normal rate and regular rhythm.    Pulmonary/Chest: Effort normal and breath sounds normal. No respiratory distress. He has no wheezes.   Neurological: He is alert and oriented to person, place, and time.   Skin: Rash noted. Rash is papular.   Erythematous papules that are scattered over torso - anterior chest and upper back         Results:     none  Assessment and Plan     Donn was seen today for hives.    Diagnoses and all orders for this visit:    Drug eruption  Urticaria  Morbilliform presentation  -     hydrOXYzine (ATARAX) 25 MG tablet; Take 1-2 tablets (25-50 mg) by mouth every 6 hours as needed for itching  -     triamcinolone (KENALOG) 0.1 % ointment; Apply sparingly to affected area three times daily as needed for itching    Patient education completed regarding possible course lasting up to 2-3 weeks.     Follow-up with no improvement or worsening of symptoms.       Options for treatment and follow-up care were reviewed with the patient. Donn Baptiste  engaged in the decision making process and verbalized understanding of the options discussed and agreed with the final plan.    Hilda Lopez, ISI CNP

## 2018-01-03 ASSESSMENT — ANXIETY QUESTIONNAIRES: GAD7 TOTAL SCORE: 3

## 2018-01-04 ASSESSMENT — ENCOUNTER SYMPTOMS
CARDIOVASCULAR NEGATIVE: 1
ROS SKIN COMMENTS: SEE HPI
CONSTITUTIONAL NEGATIVE: 1
RESPIRATORY NEGATIVE: 1

## 2018-03-30 ENCOUNTER — TELEPHONE (OUTPATIENT)
Dept: FAMILY MEDICINE | Facility: CLINIC | Age: 53
End: 2018-03-30

## 2018-03-30 DIAGNOSIS — F33.0 MAJOR DEPRESSIVE DISORDER, RECURRENT EPISODE, MILD (H): ICD-10-CM

## 2018-03-30 NOTE — TELEPHONE ENCOUNTER
[facility] phone call message- patient requesting a refill:    Full Medication Name: sertraline (ZOLOFT) 50 MG tablet        Pharmacy confirmed as   [facility] - [address] [place] [address]   Tel. [phone]      : Yes    Additional Comments: Pt states he is out of medication and out of town too. Please send medication to [facility] in [place], [place]      OK to leave a message on voice mail? Yes    Primary language: English      needed? No    Call taken on March 30, 2018 at 10:30 AM by Shahla Morales

## 2018-03-30 NOTE — TELEPHONE ENCOUNTER
Patient called clinic out of medication. Per Deb's Refill Protocol, RN filled 30 day supply to patient's requested pharmacy.    RN also routed message to PCP to send additional refills if appropriate.     LOV: 1/2/18    Kaila Estrada RN

## 2018-04-29 ENCOUNTER — TELEPHONE (OUTPATIENT)
Dept: FAMILY MEDICINE | Facility: CLINIC | Age: 53
End: 2018-04-29

## 2018-04-29 DIAGNOSIS — F33.0 MAJOR DEPRESSIVE DISORDER, RECURRENT EPISODE, MILD (H): ICD-10-CM

## 2018-04-29 NOTE — TELEPHONE ENCOUNTER
Message Return    4/29/2018  10:11 AM    Message returned by Sunny Jon    Patient: Donn Baptiste   Phone number-  [phone] (home) [phone] (work)      return their call  Phone conversation with: Patient    Situation: Donn Baptiste  Is a 52 year old  male who is calling because he ran out of Zoloft. Asked if this could be refilled for a short supply until he could see Dr. Mantilla in clinic.       Plan:  Refilled Zoloft for 30 day supply.   Advised caller to call clinic to schedule with PCP in the next 1-2 weeks.     Sunny Jon DO, MA  Family Medicine PGY-1  [facility]   Pager: [phone]

## 2018-04-30 DIAGNOSIS — F33.0 MAJOR DEPRESSIVE DISORDER, RECURRENT EPISODE, MILD (H): ICD-10-CM

## 2018-05-29 DIAGNOSIS — F33.0 MAJOR DEPRESSIVE DISORDER, RECURRENT EPISODE, MILD (H): ICD-10-CM

## 2018-05-29 NOTE — TELEPHONE ENCOUNTER
Request for medication refill: Zoloft 50mg    Date of last visit at clinic: 01/21/2018    Please complete refill if appropriate and CLOSE ENCOUNTER.    Closing the encounter signifies the refill is complete.    If refill has been denied, please complete the smart phrase .smirefuse and route it to the [facility] RN TRIAGE pool to inform the patient and the pharmacy.    Jessy Cotton, CMA

## 2018-05-31 ENCOUNTER — OFFICE VISIT (OUTPATIENT)
Dept: FAMILY MEDICINE | Facility: CLINIC | Age: 53
End: 2018-05-31
Payer: COMMERCIAL

## 2018-05-31 VITALS
RESPIRATION RATE: 16 BRPM | HEART RATE: 73 BPM | DIASTOLIC BLOOD PRESSURE: 76 MMHG | BODY MASS INDEX: 23.98 KG/M2 | HEIGHT: 68 IN | TEMPERATURE: 98 F | SYSTOLIC BLOOD PRESSURE: 117 MMHG | OXYGEN SATURATION: 96 % | WEIGHT: 158.2 LBS

## 2018-05-31 DIAGNOSIS — F33.0 MAJOR DEPRESSIVE DISORDER, RECURRENT EPISODE, MILD (H): Primary | ICD-10-CM

## 2018-05-31 NOTE — MR AVS SNAPSHOT
"              After Visit Summary   5/31/2018    Donn Baptiste    MRN: [id number]           Patient Information     Date Of Birth          1965        Visit Information        Provider Department      5/31/2018 4:20 PM Ale Wing MD [facility]        Today's Diagnoses     Major depressive disorder, recurrent episode, mild (H)    -  1       Follow-ups after your visit        Follow-up notes from your care team     Return in about 6 months (around 11/30/2018).      Who to contact     Please call your clinic at [phone] to:    Ask questions about your health    Make or cancel appointments    Discuss your medicines    Learn about your test results    Speak to your doctor            Additional Information About Your Visit        [employer] Information     [employer] gives you secure access to your electronic health record. If you see a primary care provider, you can also send messages to your care team and make appointments. If you have questions, please call your primary care clinic.  If you do not have a primary care provider, please call [phone] and they will assist you.      [employer] is an electronic gateway that provides easy, online access to your medical records. With [employer], you can request a clinic appointment, read your test results, renew a prescription or communicate with your care team.     To access your existing account, please contact your [facility] or call [phone] for assistance.        Care EveryWhere ID     This is your Care EveryWhere ID. This could be used by other organizations to access your [facility] medical records  [id number]        Your Vitals Were     Pulse Temperature Respirations Height Pulse Oximetry BMI (Body Mass Index)    73 98  F (36.7  C) (Oral) 16 5' 8\" (172.7 cm) 96% 24.05 kg/m2       Blood Pressure from Last 3 Encounters:   05/31/18 117/76   01/02/18 117/78   06/11/17 112/67    Weight from Last 3 " Encounters:   05/31/18 158 lb 3.2 oz (71.8 kg)   01/02/18 158 lb 9.6 oz (71.9 kg)   06/11/17 154 lb (69.9 kg)              Today, you had the following     No orders found for display         Where to get your medicines      These medications were sent to [facility] [id number] [place] [address] AT [facility] & [address] [place] [address]    Hours:  24-hours Phone:  [phone]     sertraline 50 MG tablet          Primary Care Provider Office Phone # Fax #    Sheila Mantilla [phone]       [address] [address]        Equal Access to Services     CEDRICK BRAVO : Hadii gemini thakur Sofallon, waaxda luqadaha, qaybta kaalmada adeegyada, gabe schmitz . So [facility] [phone].    ATENCIÓN: Si habla español, tiene a parkinson disposición servicios gratuitos de asistencia lingüística. Llame al [phone].    We comply with applicable federal civil rights laws and [place] laws. We do not discriminate on the basis of race, color, national origin, age, disability, sex, sexual orientation, or gender identity.            Thank you!     Thank you for choosing [facility]  for your care. Our goal is always to provide you with excellent care. Hearing back from our patients is one way we can continue to improve our services. Please take a few minutes to complete the written survey that you may receive in the mail after your visit with us. Thank you!             Your Updated Medication List - Protect others around you: Learn how to safely use, store and throw away your medicines at www.disposemymeds.org.          This list is accurate as of 5/31/18 11:59 PM.  Always use your most recent med list.                   Brand Name Dispense Instructions for use Diagnosis    cetirizine 10 MG tablet    zyrTEC    90 tablet    Take 1 tablet (10 mg) by mouth daily    Sinus congestion        fluticasone 50 MCG/ACT spray    FLONASE    16 g    Spray 1-2 sprays into both nostrils daily    Sinus congestion       IBUPROFEN PO           sertraline 50 MG tablet    ZOLOFT    30 tablet    Take 1 tablet (50 mg) by mouth daily    Major depressive disorder, recurrent episode, mild (H)

## 2018-05-31 NOTE — PROGRESS NOTES
"      HPI:       Donn Baptiste is a 52 year old who presents for the following  Patient presents with:  Refill Request: Medication    Patient is coming today for depression follow up. He is doing well, no complaints. He is taking Zoloft 50 mg daily.     Problem, Medication and Allergy Lists were reviewed and are current.  Patient is an established patient of this clinic.         Review of Systems:   Review of Systems   Psychiatric/Behavioral: Negative for behavioral problems, decreased concentration, dysphoric mood and suicidal ideas. The patient is not nervous/anxious.    All other systems reviewed and are negative.            Physical Exam:   Patient Vitals for the past 24 hrs:   BP Temp Temp src Pulse Resp SpO2 Height Weight   05/31/18 1612 117/76 98  F (36.7  C) Oral 73 16 96 % 5' 8\" (172.7 cm) 158 lb 3.2 oz (71.8 kg)     Body mass index is 24.05 kg/(m^2).  Vitals were reviewed and were normal     Physical Exam   Constitutional: He is oriented to person, place, and time. He appears well-developed and well-nourished. No distress.   HENT:   Head: Normocephalic and atraumatic.   Eyes: Conjunctivae are normal.   Neck: Normal range of motion. Neck supple.   Cardiovascular: Normal rate, regular rhythm and normal heart sounds.    No murmur heard.  Pulmonary/Chest: Effort normal and breath sounds normal.   Abdominal: Soft. Bowel sounds are normal.   Musculoskeletal: Normal range of motion.   Neurological: He is alert and oriented to person, place, and time.   Skin: Skin is warm and dry. He is not diaphoretic.   Psychiatric: He has a normal mood and affect. His behavior is normal. Judgment and thought content normal.         Results:     No pending results.   Assessment and Plan     # Major depressive disorder, recurrent episode, mild (H), doing well.   - refilled sertraline (ZOLOFT) 50 MG tablet; Take 1 tablet (50 mg) by mouth daily  Dispense: 30 tablet; Refill: 3    Medications Discontinued During This Encounter "   Medication Reason     hydrOXYzine (ATARAX) 25 MG tablet Medication Reconciliation Clean Up     triamcinolone (KENALOG) 0.1 % ointment Medication Reconciliation Clean Up     Options for treatment and follow-up care were reviewed with the patient. Donn Baptiste  engaged in the decision making process and verbalized understanding of the options discussed and agreed with the final plan.    Ale Wing MD, PhD  [facility],  PGY-3 Family Medicine Resident  #[id number]

## 2018-05-31 NOTE — PROGRESS NOTES
Preceptor Attestation:   Patient seen, evaluated and discussed with the resident. I have verified the content of the note, which accurately reflects my assessment of the patient and the plan of care.   Supervising Physician:  Sheila Mantilla MD

## 2018-06-01 ASSESSMENT — ENCOUNTER SYMPTOMS
NERVOUS/ANXIOUS: 0
DYSPHORIC MOOD: 0
DECREASED CONCENTRATION: 0

## 2018-06-01 ASSESSMENT — PATIENT HEALTH QUESTIONNAIRE - PHQ9: SUM OF ALL RESPONSES TO PHQ QUESTIONS 1-9: 5

## 2018-10-17 ENCOUNTER — OFFICE VISIT (OUTPATIENT)
Dept: FAMILY MEDICINE | Facility: CLINIC | Age: 53
End: 2018-10-17
Payer: COMMERCIAL

## 2018-10-17 VITALS
OXYGEN SATURATION: 99 % | HEART RATE: 76 BPM | DIASTOLIC BLOOD PRESSURE: 76 MMHG | SYSTOLIC BLOOD PRESSURE: 111 MMHG | RESPIRATION RATE: 16 BRPM | BODY MASS INDEX: 24.39 KG/M2 | TEMPERATURE: 97.5 F | WEIGHT: 160.4 LBS

## 2018-10-17 DIAGNOSIS — H91.93 BILATERAL HEARING LOSS, UNSPECIFIED HEARING LOSS TYPE: ICD-10-CM

## 2018-10-17 DIAGNOSIS — H61.23 BILATERAL IMPACTED CERUMEN: Primary | ICD-10-CM

## 2018-10-17 DIAGNOSIS — R09.81 SINUS CONGESTION: ICD-10-CM

## 2018-10-17 RX ORDER — FLUTICASONE PROPIONATE 50 MCG
1-2 SPRAY, SUSPENSION (ML) NASAL DAILY
Qty: 16 G | Refills: 3 | Status: SHIPPED | OUTPATIENT
Start: 2018-10-17

## 2018-10-17 RX ORDER — CETIRIZINE HYDROCHLORIDE 10 MG/1
10 TABLET ORAL DAILY
Qty: 90 TABLET | Refills: 3 | Status: SHIPPED | OUTPATIENT
Start: 2018-10-17

## 2018-10-17 NOTE — PROGRESS NOTES
HPI       Donn Baptiste is a 52 year old with a history of depression and seasonal allergic rhinitis who presents with 1 year of hearing loss and nasal congestion.     For about a year, Kenneth has noticed decreased hearing, which has been progressively worsening and is worse on his right side. 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 He has had his ears irrigated before without much improvement in his hearing. He has not had ear pain, discharge, or bleeding. His father and other relatives have hearing loss in old age but it did not start as early for them as he is now. 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     Additionally, Kenneth has had nearly constant nasal congestion for that past year or two, worse with seasonal changes. He has had seasonal allergies for years and takes benadryl, zyrtec, and flonase intermittently for this. He gets about 1 fever per year. He has some cough, post-nasal drip, and sore throat, as well. He also currently has a cold with increased sore throat and cough, which he believes he picked up from his kid. He does get headaches occasionally, mostly behind his eyes and sometimes in his occipital region, but these have lessened greatly since he got glasses in the past two years.    Problem, Medication and Allergy Lists were      Patient Active Problem List    Diagnosis Date Noted     Major depressive disorder, recurrent episode, mild (H) 02/25/2016     Priority: Medium     Family history of colon cancer 02/25/2016     Priority: Medium     Chemical dependency (H) 10/14/2009     Priority: Medium     Seasonal allergic rhinitis 04/28/2015     Priority: Low     Class: Chronic         Current Outpatient Prescriptions   Medication Sig Dispense Refill     carbamide peroxide (DEBROX) 6.5 % otic solution Place 5-10 drops into both ears 2  times daily 30 mL 0     cetirizine (ZYRTEC) 10 MG tablet Take 1 tablet (10 mg) by mouth daily 90 tablet 3     fluticasone (FLONASE) 50 MCG/ACT spray Spray 1-2 sprays into both nostrils daily 16 g 3     IBUPROFEN PO        sertraline (ZOLOFT) 50 MG tablet Take 1 tablet (50 mg) by mouth daily 30 tablet 3         Allergies   Allergen Reactions     Amoxicillin Rash     Urticarial reaction   .    Patient is an established patient of this clinic..         Review of Systems:   Review of Systems  Full ROS completed and negative except for that mentioned in HPI.         Physical Exam:     Vitals:    10/17/18 0929   BP: 111/76   Pulse: 76   Resp: 16   Temp: 97.5  F (36.4  C)   TempSrc: Oral   SpO2: 99%   Weight: 160 lb 6.4 oz (72.8 kg)     Body mass index is 24.39 kg/(m^2).  Vitals were reviewed and were normal     Physical Exam   General: well-appearing, no acute distress  HEENT: normocephalic, atraumatic, pupils equally round and reactive to light, eye movements intact, ear canals patent with significant amount of wax bilaterally, TMs unable to be visualized secondary to wax in canals  Neck: no cervical lymphadenopathy  Respiratory: lungs clear to auscultation bilaterally, no wheezes, rhonchi, or rales  Cardiac: regular rate and rhythm, S1/S2 heard, no murmurs, rubs, or gallops  Neuro: alert   Skin: no rashes or skin lesions on exposed skin areas  Psychiatric: pleasant, appropriate mood and affect      Results:   No testing ordered today    Assessment and Plan     Kenneth Baptiste is a 52 y.o. man with a history of allergic rhinitis and depression, who presents with 1+ year of nasal congestion and about 1 year of progressive hearing loss. His congestion is likely due to seasonal allergic rhinitis given its changing severity depending on the time of year and presence without other symptoms such as cough and fever. He may have a chronic sinusitis. Treatment for allergic rhinitis or chronic sinusitis is steroid nasal spray, which  he should take daily for about a month to see if symptoms improve. If symptoms are not better with daily Flonase and Zyrtec, he should return to clinic for further workup and possible ENT referral. He may have some fluid in his ears, as well, secondary to this congestion, which is likely contributing to his hearing loss. His hearing loss may also be in part due to cerumen impaction, so he was given debrox drops to clean out his ears. Additionally, he may be experiencing sensorineural hearing loss due to age and exposure to low sounds throughout his life. He would like to see audiology for formal hearing evaluation, which is reasonable. Prior to seeing audiology, he should start taking the allergy medications and clean the wax out of his ears as much as possible to rule out those causes for his hearing loss.    Sinus congestion  -     cetirizine (ZYRTEC) 10 MG tablet; Take 1 tablet (10 mg) by mouth daily  -     fluticasone (FLONASE) 50 MCG/ACT spray; Spray 1-2 sprays into both nostrils daily    Bilateral hearing loss, unspecified hearing loss type  -     AUDIOLOGY ADULT REFERRAL - INTERNAL    Bilateral impacted cerumen  -     carbamide peroxide (DEBROX) 6.5 % otic solution; Place 5-10 drops into both ears 2 times daily      Options for treatment and follow-up care were reviewed with the patient. Donn Baptiste  engaged in the decision making process and verbalized understanding of the options discussed and agreed with the final plan.    Amie Hsieh, MS3  [facility] [employer]      I was present with the medical student who participated in the service and in the documentation of this note. I have verified the history and personally performed the physical exam and medical decision making, and have verified the content of the note, which accurately reflects my assessment of the patient and the plan of care.  Lesley Johnson MD  Family Medicine PGY3 Resident

## 2018-10-17 NOTE — PATIENT INSTRUCTIONS
Take your zyrtec and flonase daily.     Use the debrox in both ears to loosen up the ear wax.     We referred you to Audiology for hearing testing.

## 2018-10-17 NOTE — PROGRESS NOTES
Preceptor Attestation:   Patient seen, evaluated and discussed with the resident. I have verified the content of the note, which accurately reflects my assessment of the patient and the plan of care.   Supervising Physician:  Deisy Shahid MD

## 2018-10-17 NOTE — MR AVS SNAPSHOT
After Visit Summary   10/17/2018    Donn Baptiste    MRN: [id number]           Patient Information     Date Of Birth          1965        Visit Information        Provider Department      10/17/2018 9:40 AM Radha Johnson MD [facility]        Today's Diagnoses     Bilateral impacted cerumen    -  1    Sinus congestion        Bilateral hearing loss, unspecified hearing loss type          Care Instructions    Take your zyrtec and flonase daily.     Use the debrox in both ears to loosen up the ear wax.     We referred you to Audiology for hearing testing.           Follow-ups after your visit        Additional Services     AUDIOLOGY ADULT REFERRAL - INTERNAL       Your provider has referred you to: [facility]: Audiology and Aural Rehab Services - [place] [phone]   [email or web address]    Specialty Testing:  Audiogram Only, Audiogram w/Tymps and Reflexes (Comprehensive Audiology Evaluation) and Hearing Aid Consultation                  Who to contact     Please call your clinic at [phone] to:    Ask questions about your health    Make or cancel appointments    Discuss your medicines    Learn about your test results    Speak to your doctor            Additional Information About Your Visit        [employer] Information     [employer] gives you secure access to your electronic health record. If you see a primary care provider, you can also send messages to your care team and make appointments. If you have questions, please call your primary care clinic.  If you do not have a primary care provider, please call [phone] and they will assist you.      [employer] is an electronic gateway that provides easy, online access to your medical records. With [employer], you can request a clinic appointment, read your test results, renew a prescription or communicate with your care team.     To access your existing  account, please contact your [facility] or call [phone] for assistance.        Care EveryWhere ID     This is your Care EveryWhere ID. This could be used by other organizations to access your [place] medical records  [id number]        Your Vitals Were     Pulse Temperature Respirations Pulse Oximetry BMI (Body Mass Index)       76 97.5  F (36.4  C) (Oral) 16 99% 24.39 kg/m2        Blood Pressure from Last 3 Encounters:   10/17/18 111/76   05/31/18 117/76   01/02/18 117/78    Weight from Last 3 Encounters:   10/17/18 160 lb 6.4 oz (72.8 kg)   05/31/18 158 lb 3.2 oz (71.8 kg)   01/02/18 158 lb 9.6 oz (71.9 kg)              We Performed the Following     AUDIOLOGY ADULT REFERRAL - INTERNAL          Today's Medication Changes          These changes are accurate as of 10/17/18 10:26 AM.  If you have any questions, ask your nurse or doctor.               Start taking these medicines.        Dose/Directions    carbamide peroxide 6.5 % otic solution   Commonly known as:  DEBROX   Used for:  Bilateral impacted cerumen   Started by:  Radha Johnson MD        Dose:  5-10 drop   Place 5-10 drops into both ears 2 times daily   Quantity:  30 mL   Refills:  0            Where to get your medicines      These medications were sent to [facility] [address] [place] [address] AT [facility] & [address] [place] [id number]     Phone:  [phone]     carbamide peroxide 6.5 % otic solution    cetirizine 10 MG tablet    fluticasone 50 MCG/ACT spray                Primary Care Provider Office Phone # Fax #    Sheila Mantilla [phone]       [address] [address]        Equal Access to Services     CEDRICK BRAVO AH: Marie Vale, alisha obrien, gabe whitman. [facility] [phone].    ATENCIÓN: Si allie hedrick parkinson  disposición servicios gratuitos de asistencia lingüística. Nino weiner [phone].    We comply with applicable federal civil rights laws and [place] laws. We do not discriminate on the basis of race, color, national origin, age, disability, sex, sexual orientation, or gender identity.            Thank you!     Thank you for choosing [facility]  for your care. Our goal is always to provide you with excellent care. Hearing back from our patients is one way we can continue to improve our services. Please take a few minutes to complete the written survey that you may receive in the mail after your visit with us. Thank you!             Your Updated Medication List - Protect others around you: Learn how to safely use, store and throw away your medicines at www.disposemymeds.org.          This list is accurate as of 10/17/18 10:26 AM.  Always use your most recent med list.                   Brand Name Dispense Instructions for use Diagnosis    carbamide peroxide 6.5 % otic solution    DEBROX    30 mL    Place 5-10 drops into both ears 2 times daily    Bilateral impacted cerumen       cetirizine 10 MG tablet    zyrTEC    90 tablet    Take 1 tablet (10 mg) by mouth daily    Sinus congestion       fluticasone 50 MCG/ACT spray    FLONASE    16 g    Spray 1-2 sprays into both nostrils daily    Sinus congestion       IBUPROFEN PO           sertraline 50 MG tablet    ZOLOFT    30 tablet    Take 1 tablet (50 mg) by mouth daily    Major depressive disorder, recurrent episode, mild (H)

## 2018-10-25 ENCOUNTER — TELEPHONE (OUTPATIENT)
Dept: FAMILY MEDICINE | Facility: CLINIC | Age: 53
End: 2018-10-25

## 2018-10-25 DIAGNOSIS — H91.93 BILATERAL HEARING LOSS, UNSPECIFIED HEARING LOSS TYPE: Primary | ICD-10-CM

## 2018-10-25 NOTE — TELEPHONE ENCOUNTER
[facility] Family Medicine phone call message - order or referral request from patient:     Order or referral being requested: Referral toAUDIOLOGY   At Location: [facility]: Audiology and [facility]ural Rehab Services - [place] [phone]       Additional Details: Specialty Testing:  Audiogram Only, Audiogram w/Tymps and Reflexes  Patient states that this type of test needs a separate referral put in by PCP.     OK to leave a message on voice mail? Yes. Please contact patient when referral is placed.    Primary language: English      needed? No    Call taken on October 25, 2018 at 3:56 PM by Linda New    Order request route to P [facility] TRIAGE   Referrals Route to P [facility] (Green/Dorado/Purple) CARE COORDINATOR

## 2018-10-26 NOTE — TELEPHONE ENCOUNTER
10/26/18 Message forwarded to Dr.Kara Mantilla for referral, if appropriate.    Iliana Rose  Care Coordinator

## 2018-10-30 ENCOUNTER — OFFICE VISIT (OUTPATIENT)
Dept: AUDIOLOGY | Facility: CLINIC | Age: 53
End: 2018-10-30
Payer: COMMERCIAL

## 2018-10-30 DIAGNOSIS — H90.3 SENSORY HEARING LOSS, BILATERAL: Primary | ICD-10-CM

## 2018-10-30 NOTE — MR AVS SNAPSHOT
After Visit Summary   10/30/2018    Donn Baptiste    MRN: [id number]           Patient Information     Date Of Birth          1965        Visit Information        Provider Department      10/30/2018 9:00 AM Liseth Mistry AuD M [facility] Audiology         Follow-ups after your visit        Your next 10 appointments already scheduled     Nov 20, 2018  4:00 PM CST   (Arrive by 3:45 PM)   New Patient Visit with Rick L Nissen, MD M [facility] Ear Nose and Throat ([facility])    [address] [place] [address] [phone]              Who to contact     Please call your clinic at [phone] to:    Ask questions about your health    Make or cancel appointments    Discuss your medicines    Learn about your test results    Speak to your doctor            Additional Information About Your Visit        MyChart Information     [employer] gives you secure access to your electronic health record. If you see a primary care provider, you can also send messages to your care team and make appointments. If you have questions, please call your primary care clinic.  If you do not have a primary care provider, please call [phone] and they will assist you.      [employer] is an electronic gateway that provides easy, online access to your medical records. With [employer], you can request a clinic appointment, read your test results, renew a prescription or communicate with your care team.     To access your existing account, please contact your [facility] or call [phone] for assistance.        Care EveryWhere ID     This is your Care EveryWhere ID. This could be used by other organizations to access your [place] medical records  [id number]         Blood Pressure from Last 3 Encounters:   10/17/18 111/76   05/31/18 117/76   01/02/18 117/78    Weight from Last 3 Encounters:   10/17/18 72.8 kg (160 lb 6.4 oz)   05/31/18 71.8 kg  (158 lb 3.2 oz)   01/02/18 71.9 kg (158 lb 9.6 oz)              Today, you had the following     No orders found for display       Primary Care Provider Office Phone # Fax #    Sheila Mantilla [phone]       [address] [facility] [address]  [facility] [address]        Equal Access to Services     [facility]: Hadii aad ku hadasho Soomaali, waaxda luqadaha, qaybta kaalmada adeegyada, waxay idiin hayaan adeeg karigayle laPamelaaan . So [facility] [phone].    ATENCIÓN: Si habla español, tiene a parkinson disposición servicios gratuitos de asistencia lingüística. Llame al [phone].    We comply with applicable federal civil rights laws and [place] laws. We do not discriminate on the basis of race, color, national origin, age, disability, sex, sexual orientation, or gender identity.            Thank you!     Thank you for choosing [facility] AUDIOLOGY  for your care. Our goal is always to provide you with excellent care. Hearing back from our patients is one way we can continue to improve our services. Please take a few minutes to complete the written survey that you may receive in the mail after your visit with us. Thank you!             Your Updated Medication List - Protect others around you: Learn how to safely use, store and throw away your medicines at www.disposemymeds.org.          This list is accurate as of 10/30/18  9:53 AM.  Always use your most recent med list.                   Brand Name Dispense Instructions for use Diagnosis    carbamide peroxide 6.5 % otic solution    DEBROX    30 mL    Place 5-10 drops into both ears 2 times daily    Bilateral impacted cerumen       cetirizine 10 MG tablet    zyrTEC    90 tablet    Take 1 tablet (10 mg) by mouth daily    Sinus congestion       fluticasone 50 MCG/ACT spray    FLONASE    16 g    Spray 1-2 sprays into both nostrils daily    Sinus congestion       IBUPROFEN PO           sertraline 50 MG tablet    ZOLOFT    30 tablet    Take 1 tablet (50 mg) by mouth  daily    Major depressive disorder, recurrent episode, mild (H)

## 2018-10-30 NOTE — PROGRESS NOTES
"AUDIOLOGY REPORT    SUBJECTIVE:  Donn Baptiste is a 52 year old male who was seen in Audiology at the [facility] on 10/30/18 for audiologic evaluation, referred by Radha Johnson MD.  The patient saw Dr. Johnson on 10/17/18 and was given Debrox due to bilateral cerumen.  He reports he used the drops for 1-2 days.  The patient notes gradual hearing loss over the last year, that is worse in the right ear compared to the left ear.  He works for [employer] and is finding it increasingly difficult to hear in the operating room.  He notes a \"white noise\" in both ears that is significantly louder in the right ear compared to the left.  He denies dizziness, falls or balance concerns.  He has never had ear surgery.  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      OBJECTIVE:  Fall Risk Screen:  1. Have you fallen two or more times in the past year? No  2. Have you fallen and had an injury in the past year? No  Is patient a fall risk? No  Referral initiated: No  Fall Risk Assessment Completed by Audiology    Otoscopic exam indicates cerumen bilaterally.  This was successfully removed with a lighted curette without incident prior to testing.  All of the cerumen was removed from the right canal.  In the left canal, only a small piece of deep cerumen could not be reached but it was not occluding the canal and should not affect testing.  Patient will continue use of drops in left ear for a few more days.      Pure Tone Thresholds assessed using conventional audiometry with good  reliability from 250-8000 Hz bilaterally using circumaural headphones (rechecked with insert earphones).      RIGHT:  normal hearing at 250 Hz sloping to a mild to profound sensorineural hearing loss    LEFT:    normal hearing except for a mild sensorineural notch at 4000 Hz  Negative Mamadou    Tympanogram:    RIGHT: normal eardrum mobility    LEFT:   normal eardrum mobility    Reflexes " (reported by stimulus ear):  RIGHT: Ipsilateral is absent at frequencies tested  RIGHT: Contralateral is absent at frequencies tested  LEFT:   Ipsilateral is present at normal levels  LEFT:   Contralateral is present at normal levels      Speech Reception Threshold:    RIGHT: 40 dB HL    LEFT:   10 dB HL  Word Recognition Score:     RIGHT: 32% at 85 dB HL using NU-6 recorded word list.    LEFT:   92% at 50 dB HL using NU-6 recorded word list.      ASSESSMENT:     ICD-10-CM    1. Sensory hearing loss, bilateral H90.3 AUDIOGRAM/TYMPANOGRAM - INTERFACE     [facility] Eval & Speech Recog (28179)     Tymps / Reflex   (09444)     Mamadou   ([id number])   **NOTE: Hearing loss is asymmetrical.        PLAN:  Patient should follow up with his primary care clinic regarding today's results.  A consultation in the ENT Clinic is recommended due to the asymmetrical hearing loss and the tinnitus that is significantly worse in the right ear compared to the left.  An appointment was scheduled with Rick Nissen MD on 11/20/18.  Dr. Nissen's Clinic will be notified today in case there are any additional tests or imaging he would like completed before the 11/20/18 visit.   Patient will be contacted if there are any changes in the recommendations after review.  The patient expressed understanding and agreement with this plan.      Lee Pleitez, CCC-A  Licensed Audiologist  [place] #[id number]    Enclosure: audiogram    Cc Radha Johnson MD  Cc Rick Nissen MD

## 2018-11-14 NOTE — TELEPHONE ENCOUNTER
FUTURE VISIT INFORMATION      FUTURE VISIT INFORMATION:    Date: 11-20-18    Time:     Location:   REFERRAL INFORMATION:    Referring provider:  MICHAEL SWANN    Referring providers clinic:      Reason for visit/diagnosis  hearing loss     RECORDS REQUESTED FROM:       Clinic name Comments Records Status Imaging Status   All records internal

## 2018-11-20 ENCOUNTER — PRE VISIT (OUTPATIENT)
Dept: OTOLARYNGOLOGY | Facility: CLINIC | Age: 53
End: 2018-11-20

## 2018-11-20 ENCOUNTER — OFFICE VISIT (OUTPATIENT)
Dept: OTOLARYNGOLOGY | Facility: CLINIC | Age: 53
End: 2018-11-20
Payer: COMMERCIAL

## 2018-11-20 DIAGNOSIS — H61.23 EXCESSIVE CERUMEN IN BOTH EAR CANALS: ICD-10-CM

## 2018-11-20 DIAGNOSIS — H93.11 TINNITUS, RIGHT: ICD-10-CM

## 2018-11-20 DIAGNOSIS — H90.5 SENSORINEURAL HEARING LOSS (SNHL) OF RIGHT EAR, UNSPECIFIED HEARING STATUS ON CONTRALATERAL SIDE: Primary | ICD-10-CM

## 2018-11-20 ASSESSMENT — PAIN SCALES - GENERAL: PAINLEVEL: NO PAIN (0)

## 2018-11-20 NOTE — PATIENT INSTRUCTIONS
1.  You were seen in the ENT Clinic today by Dr. Nissen.  If you have any questions or concerns after your appointment, please call [phone]. Press option #1 for scheduling related needs. Press option #3 for Nurse advice.  2.  Please schedule an appointment for an MRI scan at your convenience. We will call with results and discuss when to return to clinic based on the findings of the scan.    Penelope Vega LPN  [facility]  [phone]

## 2018-11-20 NOTE — MR AVS SNAPSHOT
After Visit Summary   11/20/2018    Donn Baptiste    MRN: [id number]           Patient Information     Date Of Birth          1965        Visit Information        Provider Department      11/20/2018 4:00 PM Nissen, Rick L, MD [facility]        Today's Diagnoses     Sensorineural hearing loss (SNHL) of right ear, unspecified hearing status on contralateral side    -  1      Care Instructions    1.  You were seen in the ENT Clinic today by Dr. Nissen.  If you have any questions or concerns after your appointment, please call [phone]. Press option #1 for scheduling related needs. Press option #3 for Nurse advice.  2.  Please schedule an appointment for an MRI scan at your convenience. We will call with results and discuss when to return to clinic based on the findings of the scan.    Penelope Vega LPN  [facility]  [phone]            Follow-ups after your visit        Your next 10 appointments already scheduled     Nov 26, 2018  6:00 PM CST   (Arrive by 5:30 PM)   MR BRAIN W/O & W CONTRAST with URMR1   [facility], [place], [facility] ([facility])    [address] [place] [id number] [phone]           How do I prepare for my exam? (Food and drink instructions) **If you will be receiving sedation or general anesthesia, please see special notes below.**  How do I prepare for my exam? (Other instructions) Take your medicines as usual, unless your doctor tells you not to. You may or may not receive intravenous (IV) contrast for this exam pending the discretion of the Radiologist.  You do not need to do anything special to prepare.  **If you will be receiving sedation or general anesthesia, please see special notes below.**  What should I wear: The MRI machine uses a strong magnet. Please wear clothes without metal (snaps, zippers). A sweatsuit works well, or we may give you a hospital gown. Please  remove any body piercings and hair extensions before you arrive. You will also remove watches, jewelry, hairpins, wallets, dentures, partial dental plates and hearing aids. You may wear contact lenses, and you may be able to wear your rings. We have a safe place to keep your personal items, but it is safer to leave them at home.  How long does the exam take: Most tests take 30 to 60 minutes.  HOWEVER, IF YOUR DOCTOR PRESCRIBES ANESTHESIA please plan on spending four to five hours in the recovery room.  What should I bring:  Bring a list of your current medicines to your exam (including vitamins, minerals and over-the-counter drugs).  Do I need a :  **If you will be receiving sedation or general anesthesia, please see special notes below.**  What should I do after the exam: No Restrictions, You may resume normal activities.  What is this test: MRI (magnetic resonance imaging) uses a strong magnet and radio waves to look inside the body. An MRA (magnetic resonance angiogram) does the same thing, but it lets us look at your blood vessels. A computer turns the radio waves into pictures showing cross sections of the body, much like slices of bread. This helps us see any problems more clearly. You may receive fluid (called  contrast ) before or during your scan. The fluid helps us see the pictures better. We give the fluid through an IV (small needle in your arm).  Who should I call with questions:  Please call the Imaging Department at your exam site with any questions. Directions, parking instructions, and other information is available on our website, [email or web address].  How do I prepare if I m having sedation or anesthesia? **IMPORTANT** THE INSTRUCTIONS BELOW ARE ONLY FOR THOSE PATIENTS WHO HAVE BEEN TOLD THEY WILL RECEIVE SEDATION OR GENERAL ANESTHESIA DURING THEIR MRI PROCEDURE:  IF YOU WILL RECEIVE SEDATION (take medicine to help you relax during your exam): You must get the medicine from your doctor  before you arrive. Bring the medicine to the exam. Do not take it at home. Arrive one hour early. Bring someone who can take you home after the test. Your medicine will make you sleepy. After the exam, you may not drive, take a bus or take a taxi by yourself. No eating 8 hours before your exam. You may have clear liquids up until 4 hours before your exam. (Clear liquids include water, clear tea, black coffee and fruit juice without pulp.)  IF YOU WILL RECEIVE ANESTHESIA (be asleep for your exam): Arrive 1 1/2 hours early. Bring someone who can take you home after the test. You may not drive, take a bus or take a taxi by yourself. No eating 8 hours before your exam. You may have clear liquids up until 4 hours before your exam. (Clear liquids include water, clear tea, black coffee and fruit juice without pulp.)              Future tests that were ordered for you today     Open Future Orders        Priority Expected Expires Ordered    MRI Temporal Bone/IAC With and W/O Contrast Routine  11/20/2019 11/20/2018            Who to contact     Please call your clinic at [phone] to:    Ask questions about your health    Make or cancel appointments    Discuss your medicines    Learn about your test results    Speak to your doctor            Additional Information About Your Visit        Central Test Information     Central Test gives you secure access to your electronic health record. If you see a primary care provider, you can also send messages to your care team and make appointments. If you have questions, please call your primary care clinic.  If you do not have a primary care provider, please call [phone] and they will assist you.      Central [facility] is an electronic gateway that provides easy, online access to your medical records. With Central [facility], you can request a clinic appointment, read your test results, renew a prescription or communicate with your care team.     To access your existing account, please contact your  [facility] or call [phone] for assistance.        Care EveryWhere ID     This is your Care EveryWhere ID. This could be used by other organizations to access your [place] medical records  [id number]         Blood Pressure from Last 3 Encounters:   10/17/18 111/76   05/31/18 117/76   01/02/18 117/78    Weight from Last 3 Encounters:   10/17/18 72.8 kg (160 lb 6.4 oz)   05/31/18 71.8 kg (158 lb 3.2 oz)   01/02/18 71.9 kg (158 lb 9.6 oz)               Primary Care Provider Office Phone # Fax #    Sheila Mantilla [phone]       [address] [address]        Equal Access to Services     CEDRICK BRAVO : Hadii gemini king hadasho Sofallon, waaxda luqadaha, qaybta kaalmada adeegyada, gabe schmitz . So [facility] [phone].    ATENCIÓN: Si habla español, tiene a parkinson disposición servicios gratuitos de asistencia lingüística. Nino al [phone].    We comply with applicable federal civil rights laws and [place] laws. We do not discriminate on the basis of race, color, national origin, age, disability, sex, sexual orientation, or gender identity.            Thank you!     Thank you for choosing [facility]  for your care. Our goal is always to provide you with excellent care. Hearing back from our patients is one way we can continue to improve our services. Please take a few minutes to complete the written survey that you may receive in the mail after your visit with us. Thank you!             Your Updated Medication List - Protect others around you: Learn how to safely use, store and throw away your medicines at www.disposemymeds.org.          This list is accurate as of 11/20/18  4:50 PM.  Always use your most recent med list.                   Brand Name Dispense Instructions for use Diagnosis    carbamide peroxide 6.5 % otic solution    DEBROX    30 mL    Place 5-10 drops into both ears 2 times daily     Bilateral impacted cerumen       cetirizine 10 MG tablet    zyrTEC    90 tablet    Take 1 tablet (10 mg) by mouth daily    Sinus congestion       fluticasone 50 MCG/ACT spray    FLONASE    16 g    Spray 1-2 sprays into both nostrils daily    Sinus congestion       IBUPROFEN PO           sertraline 50 MG tablet    ZOLOFT    30 tablet    Take 1 tablet (50 mg) by mouth daily    Major depressive disorder, recurrent episode, mild (H)

## 2018-11-20 NOTE — LETTER
11/20/2018       RE: Donn Baptiste  [address]  [facility] [address]     Dear Colleague,    Thank you for referring your patient, Donn Baptiste, to the [facility] at [facility]. Please see a copy of my visit note below.    Dear Sheila Cruz:    I had the pleasure of meeting Donn Baptiste in consultation today at the [facility] at your request.    CHIEF COMPLAINT: Right hearing loss    HISTORY OF PRESENT ILLNESS: Patient is a 52-year-old in today for assessment of his right ear and hearing.  He feels the right ear has significantly increased as far as hearing loss for the past 6+ months.  He feels before this the hearing was fairly symmetrical.  He has been around a lot of noise in the past, playing in the bands, has tried to wear protections in the past but certainly has been around a lot of noise.  He does not notice the hearing fluctuating in the ear.  He does have right tinnitus, actually has bilateral tinnitus but worse on the right.  He denies any dizziness or balance concerns.  There is no dysphagia, hoarseness, facial paresthesias.  He has not had any pain or drainage in the ears.    ALLERGIES:    Allergies   Allergen Reactions     Amoxicillin Rash     Urticarial reaction       HABITS:   Alcohol use No    History   Smoking Status     Former Smoker     Types: Cigarettes     Quit date: 4/25/2005   Smokeless Tobacco     Never Used         PAST MEDICAL HISTORY: Please see today's intake form (for the remainder of the PMH) which I reviewed and signed.  No past medical history on file.    FAMILY HISTORY/SOCIAL HISTORY:   Family History   Problem Relation Age of Onset     Cancer - colorectal Father      Diabetes Sister      Asthma No family hx of      Hypertension No family hx of      C.A.D. 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       REVIEW OF SYSTEMS: Patient Supplied Answers to Review of Systems   ENT ROS 11/20/2018   Ears, Nose, Throat Hearing loss       The remainder of the 10 point ROS is negative    PHYSICIAL EXAMINATION:  Constitutional: The patient was well-groomed and in no acute distress.   Skin: Warm and pink.  Psychiatric: The patient's affect was calm, cooperative, and appropriate.   Respiratory: Breathing comfortably without stridor or exertion of accessory muscles.  Eyes: Pupils were equal and reactive. Extraocular movement intact.   Head: Normocephalic and atraumatic. No lesions or scars.  Ears: Patient placed under the microscope for microscopic evaluation and cleaning of excessive cerumen which was obscuring good visualization of both TMs. Under high power magnification, the right ear was examined and cleaned of cerumen using curet, alligator forceps, and suction.  After cleaning, TM is fully visualized and has normal position with normal middle ear aeration. The left ear was then cleaned and inspected using microscope, instruments and similar techniques. After cleaning of obscuring cerumen, the TM has normal position with normal aeration to middle ear.  Nose: Sinuses were nontender. Anterior rhinoscopy revealed midline septum and absence of purulence or polyps.  Oral Cavity: Normal tongue, floor of mouth, buccal mucosa, and palate. No lesions or masses on inspection or palpation. No abnormal  lymph tissue in the oropharynx.   Neck: The parotid is soft without masses. Supple with normal laryngeal and tracheal landmarks.   Lymphatic: There is no palpable lymphadenopathy or other masses in the neck.   Neurologic: Alert and oriented x 3. Cranial nerves III-XI within normal limits. Voice quality normal.  Cerebellar Function Tests:  Grossly normal    Audiogram: Audiogram performed shows a mild high frequency left sensorineural hearing loss above 3000 Hz.  The right side shows a severe downsloping high-frequency sensorineural hearing loss above 500 Hz.  Of concern is the left discrimination is at 92% but the right side is only at 32%.  He has type a tympanograms bilaterally.      IMPRESSION AND PLAN:   1. Right asymmetrical sensorineural hearing loss: Talk to them about the asymmetry and concern for the right hearing especially with the very poor discrimination level.  I recommend we get a MRI scan to get a good assessment of the retrocochlear area to be sure there is no retrocochlear pathology such as an acoustic neuroma.  Discussed this in detail.  He agrees and will proceed with MRI scan.  We will call him the results of that and if abnormal let him know, he will come in for face-to-face discussion.  If it is normal, we will monitor and follow-up in 6 months, concern is to monitor the left side.  2. Bilateral tinnitus, right more than left.  Secondary to sensorineural hearing loss he has.  He is to protect his ears from noise exposure diligently.  Monitor and follow-up in 6 months.  3. Bilateral excessive cerumen: Cleaned today, no further treatment needed at this time, monitor.    We will call with results of MRI scan and proceed accordingly.    Thank you very much for the opportunity to participate in the care of your patient.    Rick L Nissen MD

## 2018-11-22 NOTE — PROGRESS NOTES
Dear Sheila Cruz:    I had the pleasure of meeting Donn Baptiste in consultation today at the [facility] at your request.    CHIEF COMPLAINT: Right hearing loss    HISTORY OF PRESENT ILLNESS: Patient is a 52-year-old in today for assessment of his right ear and hearing.  He feels the right ear has significantly increased as far as hearing loss for the past 6+ months.  He feels before this the hearing was fairly symmetrical.  He has been around a lot of noise in the past, playing in the bands, has tried to wear protections in the past but certainly has been around a lot of noise.  He does not notice the hearing fluctuating in the ear.  He does have right tinnitus, actually has bilateral tinnitus but worse on the right.  He denies any dizziness or balance concerns.  There is no dysphagia, hoarseness, facial paresthesias.  He has not had any pain or drainage in the ears.    ALLERGIES:    Allergies   Allergen Reactions     Amoxicillin Rash     Urticarial reaction       HABITS:   Alcohol use No    History   Smoking Status     Former Smoker     Types: Cigarettes     Quit date: 4/25/2005   Smokeless Tobacco     Never Used         PAST MEDICAL HISTORY: Please see today's intake form (for the remainder of the PMH) which I reviewed and signed.  No past medical history on file.    FAMILY HISTORY/SOCIAL HISTORY:   Family History   Problem Relation Age of Onset     Cancer - colorectal Father      Diabetes Sister      Asthma No family hx of      Hypertension No family hx of      C.A.D. 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       REVIEW OF SYSTEMS: Patient Supplied Answers to Review of Systems   ENT ROS 11/20/2018   Ears, Nose, Throat Hearing loss       The remainder of the 10 point ROS is negative    PHYSICIAL EXAMINATION:  Constitutional: The patient was well-groomed and in no acute distress.   Skin: Warm and pink.  Psychiatric: The patient's affect was calm, cooperative, and appropriate.   Respiratory: Breathing comfortably without stridor or exertion of accessory muscles.  Eyes: Pupils were equal and reactive. Extraocular movement intact.   Head: Normocephalic and atraumatic. No lesions or scars.  Ears: Patient placed under the microscope for microscopic evaluation and cleaning of excessive cerumen which was obscuring good visualization of both TMs. Under high power magnification, the right ear was examined and cleaned of cerumen using curet, alligator forceps, and suction.  After cleaning, TM is fully visualized and has normal position with normal middle ear aeration. The left ear was then cleaned and inspected using microscope, instruments and similar techniques. After cleaning of obscuring cerumen, the TM has normal position with normal aeration to middle ear.  Nose: Sinuses were nontender. Anterior rhinoscopy revealed midline septum and absence of purulence or polyps.  Oral Cavity: Normal tongue, floor of mouth, buccal mucosa, and palate. No lesions or masses on inspection or palpation. No abnormal lymph tissue in the oropharynx.   Neck: The parotid is soft without masses. Supple with normal laryngeal and tracheal landmarks.   Lymphatic: There is no palpable lymphadenopathy or other masses in the neck.   Neurologic: Alert and oriented x 3. Cranial nerves III-XI within normal limits. Voice quality  normal.  Cerebellar Function Tests:  Grossly normal    Audiogram: Audiogram performed shows a mild high frequency left sensorineural hearing loss above 3000 Hz.  The right side shows a severe downsloping high-frequency sensorineural hearing loss above 500 Hz.  Of concern is the left discrimination is at 92% but the right side is only at 32%.  He has type a tympanograms bilaterally.      IMPRESSION AND PLAN:   1. Right asymmetrical sensorineural hearing loss: Talk to them about the asymmetry and concern for the right hearing especially with the very poor discrimination level.  I recommend we get a MRI scan to get a good assessment of the retrocochlear area to be sure there is no retrocochlear pathology such as an acoustic neuroma.  Discussed this in detail.  He agrees and will proceed with MRI scan.  We will call him the results of that and if abnormal let him know, he will come in for face-to-face discussion.  If it is normal, we will monitor and follow-up in 6 months, concern is to monitor the left side.  2. Bilateral tinnitus, right more than left.  Secondary to sensorineural hearing loss he has.  He is to protect his ears from noise exposure diligently.  Monitor and follow-up in 6 months.  3. Bilateral excessive cerumen: Cleaned today, no further treatment needed at this time, monitor.    We will call with results of MRI scan and proceed accordingly.    Thank you very much for the opportunity to participate in the care of your patient.    Rick L Nissen MD

## 2018-11-23 DIAGNOSIS — F33.0 MAJOR DEPRESSIVE DISORDER, RECURRENT EPISODE, MILD (H): ICD-10-CM

## 2018-11-23 NOTE — TELEPHONE ENCOUNTER

## 2018-11-26 ENCOUNTER — HOSPITAL ENCOUNTER (OUTPATIENT)
Dept: MRI IMAGING | Facility: CLINIC | Age: 53
Discharge: HOME | End: 2018-11-26
Attending: OTOLARYNGOLOGY | Admitting: OTOLARYNGOLOGY
Payer: COMMERCIAL

## 2018-11-26 DIAGNOSIS — H90.5 SENSORINEURAL HEARING LOSS (SNHL) OF RIGHT EAR, UNSPECIFIED HEARING STATUS ON CONTRALATERAL SIDE: ICD-10-CM

## 2018-11-26 PROCEDURE — 25500064 ZZH RX 255 OP 636: Performed by: OTOLARYNGOLOGY

## 2018-11-26 PROCEDURE — A9585 GADOBUTROL INJECTION: HCPCS | Performed by: OTOLARYNGOLOGY

## 2018-11-26 PROCEDURE — 70553 MRI BRAIN STEM W/O & W/DYE: CPT

## 2018-11-26 RX ORDER — GADOBUTROL 604.72 MG/ML
7.5 INJECTION INTRAVENOUS ONCE
Status: COMPLETED | OUTPATIENT
Start: 2018-11-26 | End: 2018-11-26

## 2018-11-26 RX ADMIN — GADOBUTROL 7.2 ML: 604.72 INJECTION INTRAVENOUS at 17:39

## 2018-12-03 ENCOUNTER — TELEPHONE (OUTPATIENT)
Dept: OTOLARYNGOLOGY | Facility: CLINIC | Age: 53
End: 2018-12-03

## 2018-12-03 NOTE — TELEPHONE ENCOUNTER
Talked with pt today concerning his recent MRI scan and acoustic neuroma. Recommended he come in for further discussion with Dr. Kay to determine how they will treat. Discussed briefly with him options for surgery, radiation and watching. Nurse will call to set up appointment with Rahul.

## 2018-12-05 ENCOUNTER — MEDICAL CORRESPONDENCE (OUTPATIENT)
Dept: HEALTH INFORMATION MANAGEMENT | Facility: CLINIC | Age: 53
End: 2018-12-05

## 2018-12-13 ENCOUNTER — TRANSFERRED RECORDS (OUTPATIENT)
Dept: HEALTH INFORMATION MANAGEMENT | Facility: CLINIC | Age: 53
End: 2018-12-13

## 2018-12-31 ENCOUNTER — TRANSFERRED RECORDS (OUTPATIENT)
Dept: HEALTH INFORMATION MANAGEMENT | Facility: CLINIC | Age: 53
End: 2018-12-31

## 2019-01-13 ENCOUNTER — HOSPITAL ENCOUNTER (EMERGENCY)
Facility: CLINIC | Age: 54
Discharge: HOME | End: 2019-01-13
Attending: EMERGENCY MEDICINE | Admitting: EMERGENCY MEDICINE
Payer: COMMERCIAL

## 2019-01-13 ENCOUNTER — APPOINTMENT (OUTPATIENT)
Dept: GENERAL RADIOLOGY | Facility: CLINIC | Age: 54
End: 2019-01-13
Payer: COMMERCIAL

## 2019-01-13 VITALS
HEIGHT: 67 IN | WEIGHT: 160 LBS | SYSTOLIC BLOOD PRESSURE: 110 MMHG | RESPIRATION RATE: 20 BRPM | DIASTOLIC BLOOD PRESSURE: 61 MMHG | OXYGEN SATURATION: 96 % | TEMPERATURE: 98.6 F | BODY MASS INDEX: 25.11 KG/M2

## 2019-01-13 DIAGNOSIS — R07.9 CHEST PAIN, UNSPECIFIED TYPE: ICD-10-CM

## 2019-01-13 LAB
ANION GAP SERPL CALCULATED.3IONS-SCNC: 9 MMOL/L (ref 3–14)
BASOPHILS # BLD AUTO: 0 10E9/L (ref 0–0.2)
BASOPHILS NFR BLD AUTO: 0.6 %
BUN SERPL-MCNC: 12 MG/DL (ref 7–30)
CALCIUM SERPL-MCNC: 8.4 MG/DL (ref 8.5–10.1)
CHLORIDE SERPL-SCNC: 107 MMOL/L (ref 94–109)
CO2 SERPL-SCNC: 25 MMOL/L (ref 20–32)
CREAT SERPL-MCNC: 0.72 MG/DL (ref 0.66–1.25)
DIFFERENTIAL METHOD BLD: NORMAL
EOSINOPHIL # BLD AUTO: 0.5 10E9/L (ref 0–0.7)
EOSINOPHIL NFR BLD AUTO: 7.7 %
ERYTHROCYTE [DISTWIDTH] IN BLOOD BY AUTOMATED COUNT: 13.7 % (ref 10–15)
GFR SERPL CREATININE-BSD FRML MDRD: >90 ML/MIN/{1.73_M2}
GLUCOSE SERPL-MCNC: 105 MG/DL (ref 70–99)
HCT VFR BLD AUTO: 44.5 % (ref 40–53)
HGB BLD-MCNC: 15.4 G/DL (ref 13.3–17.7)
IMM GRANULOCYTES # BLD: 0 10E9/L (ref 0–0.4)
IMM GRANULOCYTES NFR BLD: 0.5 %
INTERPRETATION ECG - MUSE: NORMAL
LYMPHOCYTES # BLD AUTO: 1.9 10E9/L (ref 0.8–5.3)
LYMPHOCYTES NFR BLD AUTO: 28.3 %
MCH RBC QN AUTO: 30.3 PG (ref 26.5–33)
MCHC RBC AUTO-ENTMCNC: 34.6 G/DL (ref 31.5–36.5)
MCV RBC AUTO: 87 FL (ref 78–100)
MONOCYTES # BLD AUTO: 0.6 10E9/L (ref 0–1.3)
MONOCYTES NFR BLD AUTO: 9.2 %
NEUTROPHILS # BLD AUTO: 3.5 10E9/L (ref 1.6–8.3)
NEUTROPHILS NFR BLD AUTO: 53.7 %
NRBC # BLD AUTO: 0 10*3/UL
NRBC BLD AUTO-RTO: 0 /100
PLATELET # BLD AUTO: 235 10E9/L (ref 150–450)
POTASSIUM SERPL-SCNC: 4.1 MMOL/L (ref 3.4–5.3)
RBC # BLD AUTO: 5.09 10E12/L (ref 4.4–5.9)
SODIUM SERPL-SCNC: 141 MMOL/L (ref 133–144)
TROPONIN I SERPL-MCNC: <0.015 UG/L (ref 0–0.04)
TROPONIN I SERPL-MCNC: <0.015 UG/L (ref 0–0.04)
WBC # BLD AUTO: 6.6 10E9/L (ref 4–11)

## 2019-01-13 PROCEDURE — 85025 COMPLETE CBC W/AUTO DIFF WBC: CPT | Performed by: EMERGENCY MEDICINE

## 2019-01-13 PROCEDURE — 93005 ELECTROCARDIOGRAM TRACING: CPT

## 2019-01-13 PROCEDURE — 99285 EMERGENCY DEPT VISIT HI MDM: CPT

## 2019-01-13 PROCEDURE — 25000132 ZZH RX MED GY IP 250 OP 250 PS 637: Performed by: EMERGENCY MEDICINE

## 2019-01-13 PROCEDURE — 80048 BASIC METABOLIC PNL TOTAL CA: CPT | Performed by: EMERGENCY MEDICINE

## 2019-01-13 PROCEDURE — 25000125 ZZHC RX 250: Performed by: EMERGENCY MEDICINE

## 2019-01-13 PROCEDURE — 84484 ASSAY OF TROPONIN QUANT: CPT | Mod: 91 | Performed by: EMERGENCY MEDICINE

## 2019-01-13 PROCEDURE — 71046 X-RAY EXAM CHEST 2 VIEWS: CPT

## 2019-01-13 RX ORDER — FAMOTIDINE 20 MG/1
20 TABLET, FILM COATED ORAL 2 TIMES DAILY PRN
Qty: 30 TABLET | Refills: 0 | Status: SHIPPED | OUTPATIENT
Start: 2019-01-13 | End: 2019-04-15

## 2019-01-13 RX ADMIN — LIDOCAINE HYDROCHLORIDE 30 ML: 20 SOLUTION ORAL; TOPICAL at 15:19

## 2019-01-13 RX ADMIN — FAMOTIDINE 20 MG: 10 INJECTION, SOLUTION INTRAVENOUS at 15:19

## 2019-01-13 ASSESSMENT — ENCOUNTER SYMPTOMS: SHORTNESS OF BREATH: 1

## 2019-01-13 ASSESSMENT — MIFFLIN-ST. JEOR: SCORE: 1519.39

## 2019-01-13 NOTE — ED AVS SNAPSHOT
Emergency Department  [address] [place] [address]  Phone:  [phone]  Fax:  [phone]                                    Donn Baptiste   MRN: [id number]    Department:   Emergency Department   Date of Visit:  1/13/2019           After Visit Summary Signature Page    I have received my discharge instructions, and my questions have been answered. I have discussed any challenges I see with this plan with the nurse or doctor.    ..........................................................................................................................................  Patient/Patient Representative Signature      ..........................................................................................................................................  Patient Representative Print Name and Relationship to Patient    ..................................................               ................................................  Date                                   Time    ..........................................................................................................................................  Reviewed by Signature/Title    ...................................................              ..............................................  Date                                               Time          22EPIC Rev 08/18

## 2019-01-13 NOTE — ED PROVIDER NOTES
History     Chief Complaint:  Chest pain with shortness of breath.    HPI   Donn Baptiste is a generally healthy 53 year old male who presents with his family for evaluation of chest pain. A few nights ago the patient woke up in the middle of the night with chest pain that he describes as a burning tightness across the front of his chest with nausea and no other associated symptoms. This resolved spontaneously. He attributed this to heartburn after an internet search, but notes he has never struggled with acid reflux in the past. Today, similar pain - rated 6/10 currently - returned about 30 minutes prior to arrival but it seemed to radiate to his neck and shoulders and he felt a little short of breath. This, coupled with lack of history of heartburn, prompted his visit. He notes just prior to onset of pain today he had lunch and an espresso. He does remark shoulder pain could be related to starting an exercise routine including pushups a few days ago. Patient remarks there are no exacerbating or alleviating factors to his chest pain, including no relief with aspirin prior to arrival. Patient denies nausea or vomiting today. He denies recent illness including fever or cough.    Patient denies recent surgery under anesthesia with 3 cyberknife treatment for a right acoustic neuroma in the last month. The has no history of high blood pressure or diabetes. No family history of premature CAD.     Allergies:  Amoxicillin      Medications:    Carbamide Peroxide (Debrox) 6.5 % Otic Solution  Cetirizine (Zyrtec) 10 Mg Tablet  Fluticasone (Flonase) 50 Mcg/Act Spray  Ibuprofen Po  Sertraline (Zoloft) 50 Mg Tablet     Past Medical History:    Depression  Chemical dependency  Seasonal allergies  Right acoustic neuroma     Past Surgical History:    Cyberknife     Family History:    Cancer  Diabetes    Social History:  The patient quit smoking cigarettes about 13 years ago.   He has never used smokeless tobacco.   He does  "not drink alcohol or use drugs.   Marital Status:        Review of Systems   Constitutional: Negative for fever.   Respiratory: Positive for shortness of breath. Negative for cough.    Cardiovascular: Positive for chest pain.   Gastrointestinal: Negative for nausea and vomiting.   All other systems reviewed and are negative.    Physical Exam     Vital signs  Patient Vitals for the past 24 hrs:   BP Temp Temp src Heart Rate Resp SpO2 Height Weight   01/13/19 1439 110/61 98.6  F (37  C) Temporal 81 20 96 % 1.702 m (5' 7\") 72.6 kg (160 lb)      Physical Exam  General: Well-developed and well-nourished. Well appearing middle aged  man. Cooperative.  Head:  Atraumatic.  Eyes:  Conjunctivae, lids, and sclerae are normal.  ENT:    Normal nose. Moist mucous membranes.  Neck:  Supple. Normal range of motion.  CV:  Regular rate and rhythm. Normal heart sounds with no murmurs, rubs, or gallops detected.  Resp:  No respiratory distress. Clear to auscultation bilaterally without decreased breath sounds, wheezing, rales, or rhonchi.  GI:  Soft. Non-distended. Non-tender.    MS:  Normal ROM. No bilateral lower extremity edema.  Skin:  Warm. Non-diaphoretic. No pallor.  Neuro:  Awake. A&Ox3. Normal strength.  Psych: Normal mood and affect. Normal speech.  Vitals reviewed.    Emergency Department Course   EKG  Indication: Chest pain   Time: 1443  Rate 75 bpm. MO interval 122. QRS duration 88. QT/QTc 384/428.   Normal sinus rhythm.. Low voltage QRS. Borderline EKG.    No acute ST changes.  No prior EKG for comparison.    Imaging:  XR Chest 2 Views   Final Result   IMPRESSION: No acute disease.      LINN COPPOLA MD        Results as read by radiology.   I communicated the results of the imaging studies with the patient who expressed understanding of these findings.      Laboratory:  Troponin I @ 1710: <0.015  Troponin I @ 1455: <0.015  CBC: WNL  BMP: Glucose 105, Ca 8.4, otherwise within normal limits "     Interventions:  1519: Pepcid 20mg IV  1519: GI Cocktail      Emergency Department Course:  1458: Past medical records, nursing notes, and vitals reviewed. I performed an exam of the patient and obtained history, as documented above.       1645:  I rechecked the pateint and he feels much better. He is comfortable waiting for a second troponin.     HEART Score  Background  Calculates the overall risk of adverse event in patient's presenting with chest pain.  Based on 5 criteria (each assigned 0-2 points) including suspiciousness of history, EKG, age, risk factors and troponin.    Data  53 year old male  Seasonal allergic rhinitis; Major depressive disorder, recurrent episode, mild (H); Family history of colon cancer; and Chemical dependency (H)  Former smoker  Denies family history of premature/early CAD  Lab Results   Component Value Date    TROPI <0.015 01/13/2019     Criteria   0-2 points for each of 5 items (maximum of 10 points):  Score 0- History slightly suspicious for coronary syndrome  Score 0- EKG Normal  Score 1- Age 45 to 65 years old  Score 0- No risk factors for atherosclerotic disease (former smoker only)  Score 0- Within normal limits for troponin levels  Interpretation  Risk of adverse outcome  Heart Score: 1  Total Score 0-3- Adverse Outcome Risk 2.5% - Supports early discharge with appropriate follow-up    Impression & Plan    Medical Decision Making:  Donn is a 53-year-old male who presents with chest pain that started approximately 1 hour ago after he ate lunch.  He had a similar episode a couple of nights ago in the middle of the night which he attributed to acid reflux though he became concerned today because he does not have a history of acid reflux.  He denies all other concerns or complaints and appears well on exam.  He has no significant findings on exam.  His EKG is reassuring without acute ST changes or arrhythmias.  2 troponins, obtained 2 hours apart (with the second troponin  being 3 hours after onset of pain) are both negative.  The remainder of his labs are similarly unremarkable without leukocytosis, anemia, electrolyte disturbance, or acute kidney injury.  Chest x-ray is reassuring without cause for patient's pain including no pneumonia, pneumothorax, pleural effusion.  Mediastinum is narrow and particularly without risk factors I doubt aortic dissection.  Similarly, I believe that ACS is unlikely with a HEART score of only 1 for age. Historical features are not consistent with PE which I find unlikely without other findings such as tachypnea, tachycardia, hypoxia, or risk factors. Further, patient was given Pepcid and a GI cocktail and had complete resolution of his pain.  I do not believe he warrants outpatient stress testing as this is most consistent with acid reflux particularly given his reassuring work workup here.  I have discussed the results of laboratory and imaging studies with him as well as likely diagnosis and treatment plan which includes dietary changes as well as lifestyle changes.  I have provided a prescription for Pepcid to use twice daily as needed though I recommended close follow-up with his primary care provider to discuss symptoms, particularly if they are ongoing.  I recommended he return to the emergency department with new or concerning symptoms including, but not limited to, change in his chest pain or shortness of breath.  I answered all patient questions and he verbalized understanding.  Amenable to discharge.    Diagnosis:    ICD-10-CM    1. Chest pain, unspecified type R07.9        Disposition:  discharged home    Discharge Medications:     Medication List      Started    famotidine 20 MG tablet  Commonly known as:  PEPCID  20 mg, Oral, 2 TIMES DAILY PRN            Raj DOUGLAS am serving as a scribe at 5:08 PM on 1/13/2019 to document services personally performed by Nell Jeronimo MD based on my observations and the provider's statements to  me.     EMERGENCY DEPARTMENT       Nell Jeronimo MD  01/21/19 1058

## 2019-01-14 NOTE — DISCHARGE INSTRUCTIONS
Make dietary changes by limiting greasy, spicy foods, caffeine, alcohol, cigarettes, NSAIDs, and chocolate.  Avoid eating meals late in the night and sit upright after meals.  Start using Pepcid twice daily as needed.  Follow-up with your primary care provider to discuss symptoms.  Return to the emergency department with new pain or change in pain or if you develop new symptoms including, but not limited to, shortness of breath, fever, vomiting.

## 2019-01-21 ASSESSMENT — ENCOUNTER SYMPTOMS
VOMITING: 0
FEVER: 0
NAUSEA: 0
COUGH: 0

## 2019-01-30 ENCOUNTER — TELEPHONE (OUTPATIENT)
Dept: AUDIOLOGY | Facility: CLINIC | Age: 54
End: 2019-01-30

## 2019-02-01 ENCOUNTER — OFFICE VISIT (OUTPATIENT)
Dept: AUDIOLOGY | Facility: CLINIC | Age: 54
End: 2019-02-01
Payer: COMMERCIAL

## 2019-02-01 DIAGNOSIS — H90.3 SENSORINEURAL HEARING LOSS, BILATERAL: Primary | ICD-10-CM

## 2019-02-01 NOTE — PROGRESS NOTES
AUDIOLOGY REPORT    SUBJECTIVE: Donn Baptiste is a 53 year old male who was seen  in Audiology at the [facility] on February 1, 2019 for an osseointegrated hearing implant consultation.  The patient has been seen previously in this clinic on 10/30/2018 for hearing assessment and results indicated essentially normal hearing for the left ear (mild sensorineural hearing loss present 4000 Hz only) with 92% speech understanding for the left ear and normal sloping to profound sensorineural hearing loss with 32% speech understanding for the right ear. The patient underwent an MRI and a right acoustic neuroma was indicated, for which Dr Nissen referred the patient to Dr Taylor Kay, neuro Otologist. The patient has been in contact with Dr Nat Camarena, neuro Otologist who has told him about the option for osseointegrated implant with processor and so the patient returns today to be counseled on options to help aid in communication.    OBJECTIVE:  The patient was first reminded that he does have the non-surgical option of contralateral routing of signals (CROS) hearing system in order to bring the sound from the right over to the left by way of  and transmitter through AM signal. The patient was reminded that if his hearing did decline further for his (better hearing) left ear that the implant will not be as beneficial as it once was, where as the CROS system could be worn as a Bi-CROS system to provide amplification for the left ear after the sound is transferred over from the right. The patient has heard of this option but he prefers to wear nothing in his ears. A consultation was conducted in which the patient was presented with osseointegrated device options from [employer] and [employer]. The appointment was spent outlining and comparing the options available. The patient was most interested in and provided a trial of the Cochlear Baha 5 processor to take  home for one week. The trial was accomplished by connecting the outer processor to a headband.  When the device is placed on the mastoid bone (located behind the ear) of the poorer ear, bone vibration is used to stimulate the better cochlea.       ASSESSMENT:  An osseointegrated hearing implant consultation was conducted today as the patient has been medically cleared for surgery and use with this device.  Options were presented from [employer] [facility] and [facility] and the patient is interested in pursuing an implant for the right side (Cochlear Baha 2 standard power, brown,  microphone). The patient requests financial securing be started to confirm any out of pocket cost associated with the surgery and/or device and so ear, nose [facility] throat has been contacted to start this process and they will update the patient on any findings.       PLAN:  A request has been send to ear, nose and throat to start the process of financial securing for right Cochlear Baha 5 osseointegrated implant (brown,  microphone). The patient is not yet scheduled for follow-up with ear, nose and throat but ENT is aware that the patient has undergone an osseointegrated implant consultation and can reach out to the patient as they see fit.       Abimael Sutton.  Licensed Audiologist  [place] #[id number]

## 2019-02-04 ENCOUNTER — PATIENT OUTREACH (OUTPATIENT)
Dept: OTOLARYNGOLOGY | Facility: CLINIC | Age: 54
End: 2019-02-04

## 2019-02-04 NOTE — PATIENT INSTRUCTIONS
Left message for pt regarding scheduling clinic visit with Dr. Camarena. Provided contact information and requested call back to discuss. Amanda BLANCHARD RNCC

## 2019-02-08 ENCOUNTER — PATIENT OUTREACH (OUTPATIENT)
Dept: OTOLARYNGOLOGY | Facility: CLINIC | Age: 54
End: 2019-02-08

## 2019-02-08 NOTE — PATIENT INSTRUCTIONS
Spoke with pt regarding scheduling appointment with Dr. Camarena to discuss BAHA. Pt scheduled for 2/21/19 at 2:45pm. Pt confirms date, time, and location of appt. JANNA Jimenez

## 2019-02-19 NOTE — TELEPHONE ENCOUNTER
FUTURE VISIT INFORMATION      FUTURE VISIT INFORMATION:    Date: 2/21/19    Time: 2:45PM    Location: [facility]  REFERRAL INFORMATION:    Referring provider:  Dr Rick Nissen    Referring providers clinic:  [facility]    Reason for visit/diagnosis  acoustic neuroma / BAHA    RECORDS REQUESTED FROM:       Clinic name Comments Records Status Imaging Status   [facility] / audiology  11/20/18 notes with Dr Nissen  10/30/18 audio with Liseth Dhaliwal  2/1/19 osseointegrated hearing implant consultation with Abby Dhaliwal [facility]    [facility] 11/26/18 MR [facility] PACS   [facility] 12/13/18 notes with Cori Penny MD   Care Everywhere

## 2019-02-21 ENCOUNTER — PRE VISIT (OUTPATIENT)
Dept: OTOLARYNGOLOGY | Facility: CLINIC | Age: 54
End: 2019-02-21

## 2019-02-21 ENCOUNTER — OFFICE VISIT (OUTPATIENT)
Dept: OTOLARYNGOLOGY | Facility: CLINIC | Age: 54
End: 2019-02-21
Payer: COMMERCIAL

## 2019-02-21 DIAGNOSIS — H90.41 SENSORINEURAL HEARING LOSS (SNHL) OF RIGHT EAR WITH UNRESTRICTED HEARING OF LEFT EAR: Primary | ICD-10-CM

## 2019-02-21 ASSESSMENT — PAIN SCALES - GENERAL: PAINLEVEL: NO PAIN (0)

## 2019-02-21 NOTE — LETTER
2019    RE: Donn Baptiste  [address]  [place] [address]     Donn Baptiste is a new patient to our clinic.  He is a 53 year old male being seen for discussion regarding a right osseointegrated implant with sound processor.  He recently underwent Cyberknife for a right vestibular schwannoma and has non-serviceable hearing on the right.  He otherwise denies any otalgia, otorrhea or vertigo.  No history of ear problems.    Past Medical History:   Diagnosis Date     Allergic rhinitis      Conductive hearing loss      Depressive disorder      Hearing problem        PSHx:  No otologic surgery    Family History   Problem Relation Age of Onset     Cancer - colorectal Father      Substance Abuse Father      Cancer Father      Diabetes Sister      Mental Illness Sister      Depression Sister      Diabetes Sister      Thyroid Disease Sister      Migraines Sister      Asthma No family hx of      Hypertension No family hx of      C.A.D. No family hx of      Cerebrovascular Disease No family hx of        Social History     Tobacco Use     Smoking status: Former Smoker     Types: Cigarettes     Last attempt to quit: 2005     Years since quittin.8     Smokeless tobacco: Never Used   Substance Use Topics     Alcohol use: No     Alcohol/week: 0.0 oz     Drug use: No       Patient Supplied Answers to Review of Systems   ENT ROS 2018   Ears, Nose, Throat Hearing loss   The remainder of the 10 point review of systems is otherwise negative.    Physical examination:  Constitutional:  In no acute distress, appears stated age  Eyes:  Extraocular movements intact, no spontaneous nystagmus  Ears:  Both ears examined.  Ear canals clear, TMs intact with well aerated middle ears.  Respiratory:  No increased work of breathing, wheezing or stridor  Musculoskeletal:  Good upper extremity strength  Skin:  No rashes on the head and neck  Neurologic:  House Brackman 1/6 bilaterally, ambulating  normally  Psychiatric:  Alert, normal affect, answering questions appropriately    Audiogram:  Left normal hearing with 92% speech discrimination, normal tympanogram, present reflexes.  Right normal downsloping to profound sensorineural hearing loss with 32% speech discrimination, normal tympanogram, absent reflexes.    Assessment and plan:  We discussed placement of a right osseointegrated implant with sound processor.  He is an excellent candidate given his unilateral profound sensorineural hearing loss.  The benefits and risks were discussed.  The risks include but are not limited to:  Wound infection/wound breakdown with possible need for further surgery and failure of osseointegration requiring further surgery.  There may be a need for prolonged wound cares after surgery.  We discussed the three to six month healing period before the external processor can be fitted.  He would like to proceed.    Nat Camarena MD

## 2019-02-21 NOTE — Clinical Note
2/21/2019       RE: Donn Baptiste  [address]  [place] [address]     Dear Colleague,    Thank you for referring your patient, Donn Baptiste, to the [facility] at [facility]. Please see a copy of my visit note below.    No notes on file    Again, thank you for allowing me to participate in the care of your patient.      Sincerely,    Nat Camarena MD

## 2019-03-06 ENCOUNTER — TELEPHONE (OUTPATIENT)
Dept: OTOLARYNGOLOGY | Facility: CLINIC | Age: 54
End: 2019-03-06

## 2019-03-06 NOTE — TELEPHONE ENCOUNTER
Left msg for patient to call Jaelyn with Dr Camarena at [facility]    Jaelyn Painter   [facility]-Op Coordinator  [phone]

## 2019-03-10 NOTE — PROGRESS NOTES
Donn Baptiste is a new patient to our clinic.  He is a 53 year old male being seen for discussion regarding a right osseointegrated implant with sound processor.  He recently underwent Cyberknife for a right vestibular schwannoma and has non-serviceable hearing on the right.  He otherwise denies any otalgia, otorrhea or vertigo.  No history of ear problems.    Past Medical History:   Diagnosis Date     Allergic rhinitis      Conductive hearing loss      Depressive disorder      Hearing problem        PSHx:  No otologic surgery    Family History   Problem Relation Age of Onset     Cancer - colorectal Father      Substance Abuse Father      Cancer Father      Diabetes Sister      Mental Illness Sister      Depression Sister      Diabetes Sister      Thyroid Disease Sister      Migraines Sister      Asthma No family hx of      Hypertension No family hx of      C.A.D. No family hx of      Cerebrovascular Disease No family hx of        Social History     Tobacco Use     Smoking status: Former Smoker     Types: Cigarettes     Last attempt to quit: 2005     Years since quittin.8     Smokeless tobacco: Never Used   Substance Use Topics     Alcohol use: No     Alcohol/week: 0.0 oz     Drug use: No       Patient Supplied Answers to Review of Systems   ENT ROS 2018   Ears, Nose, Throat Hearing loss   The remainder of the 10 point review of systems is otherwise negative.    Physical examination:  Constitutional:  In no acute distress, appears stated age  Eyes:  Extraocular movements intact, no spontaneous nystagmus  Ears:  Both ears examined.  Ear canals clear, TMs intact with well aerated middle ears.  Respiratory:  No increased work of breathing, wheezing or stridor  Musculoskeletal:  Good upper extremity strength  Skin:  No rashes on the head and neck  Neurologic:  House Brackman 1/6 bilaterally, ambulating normally  Psychiatric:  Alert, normal affect, answering questions appropriately    Audiogram:   Left normal hearing with 92% speech discrimination, normal tympanogram, present reflexes.  Right normal downsloping to profound sensorineural hearing loss with 32% speech discrimination, normal tympanogram, absent reflexes.    Assessment and plan:  We discussed placement of a right osseointegrated implant with sound processor.  He is an excellent candidate given his unilateral profound sensorineural hearing loss.  The benefits and risks were discussed.  The risks include but are not limited to:  Wound infection/wound breakdown with possible need for further surgery and failure of osseointegration requiring further surgery.  There may be a need for prolonged wound cares after surgery.  We discussed the three to six month healing period before the external processor can be fitted.  He would like to proceed.

## 2019-03-21 ENCOUNTER — TRANSFERRED RECORDS (OUTPATIENT)
Dept: HEALTH INFORMATION MANAGEMENT | Facility: CLINIC | Age: 54
End: 2019-03-21

## 2019-03-25 ENCOUNTER — DOCUMENTATION ONLY (OUTPATIENT)
Dept: OTOLARYNGOLOGY | Facility: CLINIC | Age: 54
End: 2019-03-25

## 2019-03-25 NOTE — PROGRESS NOTES
Patient is scheduled for surgery with Dr. Camarena    Proc:  Right bone anchored hearing aid placement    Spoke or left message with: patient    Date of Surgery: 4/26/19    Location: [facility]    H&P: Scheduled with Sarah Beth    Additional imaging/appointments:   Post OP wk 1  5/2/19  8:30  Post op Week 3-4 5/30/19  11:00    Surgery packet: given at appt     Additional comments: Notified: Abby Schafer (audio) Derrick Ayoub (audio)  Amanda Talamantes - RN [facility]        Jaelyn Painter   [facility]-Op Coordinator  [phone]

## 2019-04-15 ENCOUNTER — OFFICE VISIT (OUTPATIENT)
Dept: FAMILY MEDICINE | Facility: CLINIC | Age: 54
End: 2019-04-15
Payer: COMMERCIAL

## 2019-04-15 VITALS
HEIGHT: 68 IN | HEART RATE: 83 BPM | OXYGEN SATURATION: 96 % | DIASTOLIC BLOOD PRESSURE: 74 MMHG | BODY MASS INDEX: 24.1 KG/M2 | WEIGHT: 159 LBS | TEMPERATURE: 98 F | RESPIRATION RATE: 18 BRPM | SYSTOLIC BLOOD PRESSURE: 115 MMHG

## 2019-04-15 DIAGNOSIS — Z01.818 PREOP GENERAL PHYSICAL EXAM: Primary | ICD-10-CM

## 2019-04-15 ASSESSMENT — MIFFLIN-ST. JEOR: SCORE: 1526.23

## 2019-04-15 NOTE — PROGRESS NOTES
Preceptor Attestation:   Patient seen, evaluated and discussed with the resident. I have verified the content of the note, which accurately reflects my assessment of the patient and the plan of care.   Supervising Physician:  Bere Hammer MD

## 2019-04-15 NOTE — PATIENT INSTRUCTIONS
Stop ibuprofen at least 5 days prior to surgery  No food 8 hours prior, can have clear liquids up until 2 hours prior  Presurgery Checklist  You are scheduled to have surgery. The healthcare staff will try to make your stay comfortable. Use the guidelines below to remind yourself what to do before surgery. Be sure to follow any specific pre-op instructions from your surgeon or nurse.   Preparing for Surgery  Ask your surgeon if you ll need a blood transfusion during surgery and if so, how to prepare for it. In some cases, you can donate blood before surgery. If needed, this blood can be given back (transfused) to you during or after surgery.  If you are having abdominal surgery, ask what you need to do to clear your bowel.  Tell your surgeon if you have allergies to any medications or foods.  Arrange for an adult family member or friend to drive you home after surgery. If possible, have someone ready to help you at home as you recover.  Call the surgeon if you get a cold, fever, sore throat, diarrhea, or other health problem just before surgery. Your surgeon can decide whether or not to postpone the surgery.  Medications  Tell your surgeon about all medications you take, including prescription and over-the-counter products such as herbal remedies and vitamins. Ask if you should continue taking them.  If you take ibuprofen, naproxen, or  blood thinners  such as aspirin, clopidogrel (Plavix), or warfarin (Coumadin), ask your surgeon whether you should stop taking them and how long before surgery you should stop.  You may be told to take antibiotics just before surgery to prevent infection. If so, follow instructions carefully on how to take them.  If you are told to take medications called anticoagulants to prevent blood clots after surgery, be sure to follow the instructions on how to take them.  Stop Smoking  If you smoke, healing may take longer. So at least 2 week(s) before surgery, stop smoking.  Bathing or  Showering Before Surgery  If instructed, wash with antibacterial soap. Afterward, do not use lotions or powders.  If you are having surgery on the head, you may be asked to shampoo with antibacterial soap. Follow instructions for doing so.  Do Not Remove Hair from the Surgery Site  Do not shave hair from the incision site, unless you are given specific instructions to do so. Usually, if hair needs to be removed, it will be done at the hospital right before surgery.  Don t Eat or Drink  Your doctor will tell you when to stop eating and drinking. If you do not follow your doctor's instructions, your procedure may be postponed or rescheduled for another day.  If your surgeon tells you to continue any medications, take them with small sips of water.  You can brush your teeth and rinse your mouth, but don t swallow any water.  Day of Surgery  Do not wear makeup. Do not use perfume, deodorant, or hairspray. Remove nail polish and artificial nails.  Leave jewelry (including rings), watches, and other valuables at home.  Be sure to bring health insurance cards or forms and a photo ID.  Bring a list of your medications (include the name, dose, how often you take them, and the time last dose was taken).  Arrive on time at the hospital or surgery facility.

## 2019-04-15 NOTE — PROGRESS NOTES
[facility]  [address],  [address]  [facility] [address]  Phone: [phone]  Fax: [phone]    4/15/2019    Adult PRE-OP Evaluation:    Donn Baptiste, 1965 presents for pre-operative evaluation and assessment as requested by , prior to undergoing surgery/procedure for treatment of  Right Bone Anchored Hearing Aid Placement .    Proposed procedure:     Date of Surgery/ Procedure: 4/26/19  Hospital/Surgical Facility:    [facility]  Address: [address] [place] [id number]  Departments: [facility]   [facility] [place]    Phone: [phone]     Primary Physician: Sheila Mantilla  Type of Anesthesia Anticipated: Local with MAC  History of anesthesia complications: NONE  History of  abnormal bleeding: NONE   History of blood transfusions: NO   Patient has a Health Care Directive or Living Will:  NO    Preoperative Questions   1. NO - Do you have a history of heart attack, stroke, stent, bypass or surgery on an artery in the head, neck, heart or legs?  2. NO - Do you ever have any pain or discomfort in your chest?  3. NO - Have you ever had a severe pain across the front of your chest lasting for half an hour or more?  4. NO - Do you have a history of Congestive Heart Failure?  5. NO - Are you troubled by shortness of breath when: walking on the level/ up a slight hill/ at night?  6. NO - Does your chest ever sound wheezy or whistling?  7. NO - Do you currently have a cold, bronchitis or other respiratory infection?  8. NO - Have you had a cold, bronchitis or other respiratory infection within the last 2 weeks?  9. NO - Do you usually have a cough?  10. NO - Do you sometimes get pains in the calves of your legs when you walk?  11. NO - Do you or anyone in your family have previous history of blood clots?  12. NO - Do you or does anyone in your family have a serious  bleeding problem such as prolonged bleeding following surgeries or cuts?  13. NO - Have you ever had problems with anemia or been told to take iron pills?  14. NO - Have you had any abnormal blood loss such as black, tarry or bloody stools, or abnormal vaginal bleeding?  15. NO - Have you ever had a blood transfusion?  16. NO - Have you or any of your relatives ever had problems with anesthesia?  17. NO - Do you have sleep apnea, excessive snoring or daytime drowsiness?  18. NO - Do you have any prosthetic heart valves?  19. NO - Do you have prosthetic joints?  20. NO - Is there any chance that you may be pregnant?    Patient Active Problem List   Diagnosis     Seasonal allergic rhinitis     Major depressive disorder, recurrent episode, mild (H)     Family history of colon cancer     Chemical dependency (H)         Current Outpatient Medications on File Prior to Visit:  cetirizine (ZYRTEC) 10 MG tablet Take 1 tablet (10 mg) by mouth daily   fluticasone (FLONASE) 50 MCG/ACT spray Spray 1-2 sprays into both nostrils daily   IBUPROFEN PO    sertraline (ZOLOFT) 50 MG tablet Take 1 tablet (50 mg) by mouth daily     No current facility-administered medications on file prior to visit.     OTC products: None, except as noted above  Ibuprofen he uses sparingly, last use one week ago, discussed not using at least 5 days prior to procedure    Allergies   Allergen Reactions     Amoxicillin Rash     Urticarial reaction     Latex Allergy: NO    Social History     Socioeconomic History     Marital status:      Spouse name: None     Number of children: 1     Years of education: None     Highest education level: None   Occupational History     Employer: [employer]   Social Needs     Financial resource strain: None     Food insecurity:     Worry: None     Inability: None     Transportation needs:     Medical: None     Non-medical: None   Tobacco Use     Smoking status: Former Smoker     Types: Cigarettes     Last attempt to  "quit: 2005     Years since quittin.9     Smokeless tobacco: Never Used   Substance and Sexual Activity     Alcohol use: No     Alcohol/week: 0.0 oz     Drug use: No     Sexual activity: Yes     Partners: Female     Birth control/protection: Male Surgical     Comment: Vasectomy    Lifestyle     Physical activity:     Days per week: None     Minutes per session: None     Stress: None   Relationships     Social connections:     Talks on phone: None     Gets together: None     Attends Anglican service: None     Active member of club or organization: None     Attends meetings of clubs or organizations: None     Relationship status: None     Intimate partner violence:     Fear of current or ex partner: None     Emotionally abused: None     Physically abused: None     Forced sexual activity: None   Other Topics Concern     Parent/sibling w/ CABG, MI or angioplasty before 65F 55M? 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       REVIEW OF SYSTEMS:   Constitutional, HEENT, cardiovascular, pulmonary, gi and gu systems are negative, except as otherwise noted.    EXAM:     Patient Vitals for the past 24 hrs:   BP Temp Temp src Pulse Resp SpO2 Height Weight   04/15/19 1610 115/74 98  F (36.7  C) Oral 83 18 96 % 1.72 m (5' 7.72\") 72.1 kg (159 lb)     Body mass index is 24.38 kg/m .  GENERAL: healthy, alert and no distress  EYES: Eyes grossly normal to inspection, extraocular movements - intact  HENT: ear canals- normal; Nose- normal; Mouth- no ulcers, no lesions  NECK: no tenderness, no adenopathy, no asymmetry, no masses, no stiffness; thyroid- normal to palpation  RESP: lungs clear to auscultation - no rales, no rhonchi, no wheezes  CV: regular rates and rhythm, normal S1 S2, no S3 or S4 and no murmur, no click or rub -  ABDOMEN: soft, no tenderness, no  hepatosplenomegaly, no masses, normal bowel sounds  MS: extremities- no gross deformities noted, no " edema  SKIN: no suspicious lesions, no rashes  NEURO: strength and tone- normal, sensory exam- grossly normal, mentation- intact, speech- normal, reflexes- symmetric  BACK: no CVA tenderness, no paralumbar tenderness  PSYCH: Alert and oriented times 3; speech- coherent , normal rate and volume; able to articulate logical thoughts  LYMPHATICS: ant. cervical- normal, post. cervical- normal    DIAGNOSTICS:      No labs or EKG required for low risk surgery (cataract, skin procedure, breast biopsy, etc)    RISK ASSESSMENT:     Cardiovascular Risk:  -Patient is able to participate in strenuous activities without chest pain.  -The patient does not have chest pain with exertion.  -Patient does not have a history of congestive heart failure.    -The patient does not have a history of stroke and does not have a history of valvular disease.    Pulmonary Risk:  -In terms of risk factors for pulmonary complication, the patient has no risk factors    Perioperative Complications:  -The patient does not have a history of bleeding or clotting problems in the past.    -The patient has not had surgery previously.    -The patient does not have a family history of any anesthesia or surgical complications.      IMPRESSION:   Reason for surgery/procedure: hearing loss    The proposed surgical procedure is considered LOW risk.    For above listed surgery and anesthesia:   Patient is at low risk for surgery/procedure and perioperative/procedure complications.    RECOMMENDATIONS:     Labs:  None needed    Fasting:  May have clear liquids until 2 hours preoperatively and solids until 6 hours preoperatively    Preop Plan:  --Approval given to proceed with proposed procedure, without further diagnostic evaluation    Medications:  Patient should take their regular medications the morning of surgery unless otherwise instructed.    Hold ibuprofen for 5 days prior.      Adama Zimmer MD    Please contact our office if there are any further  questions or information required about this patient.

## 2019-04-20 ENCOUNTER — OFFICE VISIT (OUTPATIENT)
Dept: URGENT CARE | Facility: URGENT CARE | Age: 54
End: 2019-04-20
Payer: COMMERCIAL

## 2019-04-20 ENCOUNTER — ANCILLARY PROCEDURE (OUTPATIENT)
Dept: GENERAL RADIOLOGY | Facility: CLINIC | Age: 54
End: 2019-04-20
Attending: PHYSICIAN ASSISTANT
Payer: COMMERCIAL

## 2019-04-20 VITALS
WEIGHT: 159 LBS | HEIGHT: 68 IN | DIASTOLIC BLOOD PRESSURE: 58 MMHG | BODY MASS INDEX: 24.1 KG/M2 | RESPIRATION RATE: 14 BRPM | SYSTOLIC BLOOD PRESSURE: 100 MMHG | HEART RATE: 80 BPM | TEMPERATURE: 97.8 F

## 2019-04-20 DIAGNOSIS — M25.522 PAIN IN LEFT ELBOW: ICD-10-CM

## 2019-04-20 DIAGNOSIS — S52.125A NONDISPLACED FRACTURE OF HEAD OF LEFT RADIUS, INITIAL ENCOUNTER FOR CLOSED FRACTURE: Primary | ICD-10-CM

## 2019-04-20 PROCEDURE — 99214 OFFICE O/P EST MOD 30 MIN: CPT | Mod: 25 | Performed by: PHYSICIAN ASSISTANT

## 2019-04-20 PROCEDURE — 29105 APPLICATION LONG ARM SPLINT: CPT | Performed by: PHYSICIAN ASSISTANT

## 2019-04-20 PROCEDURE — 73080 X-RAY EXAM OF ELBOW: CPT | Mod: LT

## 2019-04-20 RX ORDER — HYDROCODONE BITARTRATE AND ACETAMINOPHEN 5; 325 MG/1; MG/1
1 TABLET ORAL
Qty: 6 TABLET | Refills: 0 | Status: SHIPPED | OUTPATIENT
Start: 2019-04-20 | End: 2019-04-26

## 2019-04-20 ASSESSMENT — MIFFLIN-ST. JEOR: SCORE: 1522.78

## 2019-04-21 ENCOUNTER — HOSPITAL ENCOUNTER (EMERGENCY)
Facility: CLINIC | Age: 54
Discharge: HOME | End: 2019-04-21
Payer: COMMERCIAL

## 2019-04-21 NOTE — ED TRIAGE NOTES
Patient was seen at urgent care yesterday after a fall while rollerblading. Pt was found to have a radial head fracture and was placed in a splint by urgent care MD. He was told to follow up with orthopedics for a hard cast placement. Presented to ED today requesting a hard cast placement. Splint in place with no c/o numbness, tingling or pain. After speaking with Dr. Abrams, told patient to follow up with [place]opedics/[place] [facility] [place] [facility]. Patient did not want further examination in ED and signed Declination of Medical Screening Exam form.

## 2019-04-22 ENCOUNTER — TELEPHONE (OUTPATIENT)
Dept: ORTHOPEDICS | Facility: CLINIC | Age: 54
End: 2019-04-22

## 2019-04-22 NOTE — TELEPHONE ENCOUNTER
Patient had called and left  on walk-in phone yesterday about coming in to get a cast placed for his radial head fracture. I called pt back to discuss this. He states he showed up yesterday and found out we were closed so he went to the ED but was not seen because they wouldn't cast him there. I informed the pt we don't cast radial head fractures anymore and that most people do well with just a sling and splint. We also won't redo Xrays this early since a fracture still wouldn't be visible and that we usually recommend a one week follow up. He states he's unsure about his splint because while it was being applied the person at  applying it told him they weren't doing a very good job. He would like it looked at. I informed him we can definitely do that and some patients do well in just a sling without a splint. He was very happy to hear this and plans to come in later today after his work shift. He had no further questions.

## 2019-04-23 ENCOUNTER — OFFICE VISIT (OUTPATIENT)
Dept: ORTHOPEDICS | Facility: CLINIC | Age: 54
End: 2019-04-23
Payer: COMMERCIAL

## 2019-04-23 VITALS — HEIGHT: 68 IN | BODY MASS INDEX: 24.1 KG/M2 | WEIGHT: 159 LBS | RESPIRATION RATE: 16 BRPM

## 2019-04-23 DIAGNOSIS — M25.522 PAIN IN JOINT OF LEFT ELBOW: Primary | ICD-10-CM

## 2019-04-23 ASSESSMENT — MIFFLIN-ST. JEOR: SCORE: 1522.78

## 2019-04-23 NOTE — LETTER
4/23/2019       RE: Donn Baptiste  [address]  [facility] [address]     Dear Colleague,    Thank you for referring your patient, Donn Baptiste, to the [facility] W[facility] CLINIC at [facility]. Please see a copy of my visit note below.          SPORTS & ORTHOPEDIC WALK-IN VISIT 4/23/2019    Primary Care Physician: Dr. Mantilla    Fell rollerblading on Saturday. Planted left hand and felt the pain shoot up the arm. Seen in UC, xrays done, put in a sugar tong splint and told he needed a hard cast.   Feels okay, splint keeps him from pronation supination which causes the most pain. Finger and shoulder movements feel better now. Hasn't taken ace wrap off to re-wrap it. Doesn't feel like splint has loosened up at all.   Hasn't taken ibuprofen due to surgery scheduled on 4/26.     Reason for visit:     What part of your body is injured / painful?  left elbow    What caused the injury /pain? Fall    How long ago did your injury occur or pain begin? several days ago    What are your most bothersome symptoms? Pain and Swelling    How would you characterize your symptom?  Not much pain in splint    What makes your symptoms better? Other: splint    What makes your symptoms worse? Other: pronation/supination    Have you been previously seen for this problem? Yes,     Medical History:    Any recent changes to your medical history? No    Any new medication prescribed since last visit? No    Have you had surgery on this body part before? No    Social History:    Occupation: [employer]    Handedness: Right    Exercise: 1-2 days/week    Review of Systems:    Do you have fever, chills, weight loss? No    Do you have any vision problems? No    Do you have any chest pain or edema? No    Do you have any shortness of breath or wheezing?  No    Do you have stomach problems? No    Do you have any numbness or focal weakness? No    Do you have diabetes? No    Do  you have problems with bleeding or clotting? No    Do you have an rashes or other skin lesions? No           [facility]  Orthopedics  Adama Pizarro,   2019     Name: Donn Baptiste  MRN: [id number]  Age: 53 year old  : 1965  Referring provider: Referred Self     Chief Complaint: Pain of the Left Elbow     Date of Injury: 19    History of Present Illness:   Donn Baptiste is a 53 year old, right handed male who presents today for evaluation of left elbow pain sustained after falling onto an outstretched left hand while rollerblading on 19. He endorsed shooting pain up his left arm at the time of the injury. He was initially evaluated in Urgent Care by KAPIL Dong on 19, at which time x-rays were taken and he was placed in a sugar tong splint and told that he needed a hard cast. Today, he endorses pain and swelling in his left elbow and pain in the lower arm region. He notes that his finger and shoulder movements have improved since the time of the injury. He hasn't taken his ace rap off yet and believes that his splint has not loosened up. Pain is exacerbated with pronation and supination. Pain is alleviated with splint use. He has not taken ibuprofen because he has a surgery scheduled on 19.     Review of Systems:   A 10-point review of systems was obtained and is negative except for as noted in the HPI.     Medications:      cetirizine (ZYRTEC) 10 MG tablet, Take 1 tablet (10 mg) by mouth daily, Disp: 90 tablet, Rfl: 3     fluticasone (FLONASE) 50 MCG/ACT spray, Spray 1-2 sprays into both nostrils daily, Disp: 16 g, Rfl: 3     HYDROcodone-acetaminophen (NORCO) 5-325 MG tablet, Take 1 tablet by mouth nightly as needed for breakthrough pain or severe pain, Disp: 6 tablet, Rfl: 0     IBUPROFEN PO, , Disp: , Rfl:      sertraline (ZOLOFT) 50 MG tablet, Take 1 tablet (50 mg) by mouth daily, Disp: 90 tablet, Rfl: 3    Allergies:  Amoxicillin    Past Medical  "History:  Allergic rhinitis  Conductive hearing loss  Depressive disorder  Hearing problem    Past Surgical History:  The patient does not have any pertinent past surgical history.     Social History:  Patient works at [employer]. He is a former smoker; quit date: 4/25/2005 and denies alcohol use.     Family History:  Positive: Cancer (father), Substance Abuse (father), Diabetes (sister), Mental Illness (sister), Depression (sister), Thyroid Disease (sister), Migraines (sister)    Physical Examination:  Resp. rate 16, height 1.715 m (5' 7.5\"), weight 72.1 kg (159 lb).     General  - normal appearance, in no obvious distress  CV  - normal radial pulse  Pulm  - normal respiratory pattern, non-labored  Musculoskeletal - right elbow and right wrist  - inspection: normal joint alignment, no obvious deformity, no swelling at the wrist and olecranon  - palpation: non tender at the lateral epicondyle, non tender at the radial head, non tender at the medial epicondyle, non tender at the olecranon, mild tenderness at the cubital tunnel.   -Elbow ROM:  90 deg flexion   20 deg extension   90 deg pronation   90 deg supination  -Wrist ROM: Full ROM in flexion, extension, radial and ulnar deviation   Mild discomfort with radial deviation  - strength: 5/5  strength, 5/5 flexion, extension, pronation, supination  - special tests:  (-) varus  (-) valgus  (+) Tinel's elbow  Neuro  - no sensory or motor deficit, grossly normal coordination, normal muscle tone  Skin  - no ecchymosis, erythema, warmth, or induration, no obvious rash  Psych  - interactive, appropriate, normal mood and affect    Imaging:   Radiographs of the left elbow - 3 views (04/20/2019)  Large LEFT elbow effusion is seen. No fractures are identified. However, given the effusion, occult fracture may be present. Joint spaces are preserved and in normal alignment. Mild olecranon enthesopathy.    I have independently reviewed the above imaging studies; the results were " discussed with the patient.     Assessment:   53 year old, right handed male presenting for definitive management of suspect occult left elbow fracture.  Discussed advanced imaging will not be necessary to treat this injury as a nondisplaced fracture, patient understands.     Plan:   - He was placed in a TubiGrip elbow sleeve.   - Avoid lifting more than the weight of a paper  - Sling use x 1 week then discussed early ROM  - Modify activities; avoidance of lifting/heavy activity/ no lifting>3lbs with left arm.  - Ice as needed  - Follow-up with me in 2 weeks for repeat x-rays    I, Adama Pizarro DO, have reviewed the above note and agree with the scribe's notation as written.    Scribe Disclosure:  I, Mono Bray, am serving as a scribe to document services personally performed by Adama Pizarro DO at this visit, based upon the provider's statements to me. All documentation has been reviewed by the aforementioned provider prior to being entered into the official medical record.    Again, thank you for allowing me to participate in the care of your patient.      Sincerely,    Adama Pizarro DO

## 2019-04-23 NOTE — PROGRESS NOTES
SPORTS & ORTHOPEDIC WALK-IN VISIT 4/23/2019    Primary Care Physician: Dr. Mantilla    Fell rollerblading on Saturday. Planted left hand and felt the pain shoot up the arm. Seen in , xrays done, put in a sugar tong splint and told he needed a hard cast.   Feels okay, splint keeps him from pronation supination which causes the most pain. Finger and shoulder movements feel better now. Hasn't taken ace wrap off to re-wrap it. Doesn't feel like splint has loosened up at all.   Hasn't taken ibuprofen due to surgery scheduled on 4/26.     Reason for visit:     What part of your body is injured / painful?  left elbow    What caused the injury /pain? Fall    How long ago did your injury occur or pain begin? several days ago    What are your most bothersome symptoms? Pain and Swelling    How would you characterize your symptom?  Not much pain in splint    What makes your symptoms better? Other: splint    What makes your symptoms worse? Other: pronation/supination    Have you been previously seen for this problem? Yes,     Medical History:    Any recent changes to your medical history? No    Any new medication prescribed since last visit? No    Have you had surgery on this body part before? No    Social History:    Occupation: [employer]    Handedness: Right    Exercise: 1-2 days/week    Review of Systems:    Do you have fever, chills, weight loss? No    Do you have any vision problems? No    Do you have any chest pain or edema? No    Do you have any shortness of breath or wheezing?  No    Do you have stomach problems? No    Do you have any numbness or focal weakness? No    Do you have diabetes? No    Do you have problems with bleeding or clotting? No    Do you have an rashes or other skin lesions? No

## 2019-04-23 NOTE — PROGRESS NOTES
[facility]  Orthopedics  Adama Pizarro, DO  2019     Name: Donn Baptiste  MRN: [id number]  Age: 53 year old  : 1965  Referring provider: Referred Self     Chief Complaint: Pain of the Left Elbow     Date of Injury: 19    History of Present Illness:   Donn Baptiste is a 53 year old, right handed male who presents today for evaluation of left elbow pain sustained after falling onto an outstretched left hand while rollerblading on 19. He endorsed shooting pain up his left arm at the time of the injury. He was initially evaluated in Urgent Care by KAPIL Dong on 19, at which time x-rays were taken and he was placed in a sugar tong splint and told that he needed a hard cast. Today, he endorses pain and swelling in his left elbow and pain in the lower arm region. He notes that his finger and shoulder movements have improved since the time of the injury. He hasn't taken his ace rap off yet and believes that his splint has not loosened up. Pain is exacerbated with pronation and supination. Pain is alleviated with splint use. He has not taken ibuprofen because he has a surgery scheduled on 19.     Review of Systems:   A 10-point review of systems was obtained and is negative except for as noted in the HPI.     Medications:      cetirizine (ZYRTEC) 10 MG tablet, Take 1 tablet (10 mg) by mouth daily, Disp: 90 tablet, Rfl: 3     fluticasone (FLONASE) 50 MCG/ACT spray, Spray 1-2 sprays into both nostrils daily, Disp: 16 g, Rfl: 3     HYDROcodone-acetaminophen (NORCO) 5-325 MG tablet, Take 1 tablet by mouth nightly as needed for breakthrough pain or severe pain, Disp: 6 tablet, Rfl: 0     IBUPROFEN PO, , Disp: , Rfl:      sertraline (ZOLOFT) 50 MG tablet, Take 1 tablet (50 mg) by mouth daily, Disp: 90 tablet, Rfl: 3    Allergies:  Amoxicillin    Past Medical History:  Allergic rhinitis  Conductive hearing loss  Depressive disorder  Hearing problem    Past Surgical History:  The  "patient does not have any pertinent past surgical history.     Social History:  Patient works at [employer]. He is a former smoker; quit date: 4/25/2005 and denies alcohol use.     Family History:  Positive: Cancer (father), Substance Abuse (father), Diabetes (sister), Mental Illness (sister), Depression (sister), Thyroid Disease (sister), Migraines (sister)    Physical Examination:  Resp. rate 16, height 1.715 m (5' 7.5\"), weight 72.1 kg (159 lb).     General  - normal appearance, in no obvious distress  CV  - normal radial pulse  Pulm  - normal respiratory pattern, non-labored  Musculoskeletal - right elbow and right wrist  - inspection: normal joint alignment, no obvious deformity, no swelling at the wrist and olecranon  - palpation: non tender at the lateral epicondyle, non tender at the radial head, non tender at the medial epicondyle, non tender at the olecranon, mild tenderness at the cubital tunnel.   -Elbow ROM:  90 deg flexion   20 deg extension   90 deg pronation   90 deg supination  -Wrist ROM: Full ROM in flexion, extension, radial and ulnar deviation   Mild discomfort with radial deviation  - strength: 5/5  strength, 5/5 flexion, extension, pronation, supination  - special tests:  (-) varus  (-) valgus  (+) Tinel's elbow  Neuro  - no sensory or motor deficit, grossly normal coordination, normal muscle tone  Skin  - no ecchymosis, erythema, warmth, or induration, no obvious rash  Psych  - interactive, appropriate, normal mood and affect    Imaging:   Radiographs of the left elbow - 3 views (04/20/2019)  Large LEFT elbow effusion is seen. No fractures are identified. However, given the effusion, occult fracture may be present. Joint spaces are preserved and in normal alignment. Mild olecranon enthesopathy.    I have independently reviewed the above imaging studies; the results were discussed with the patient.     Assessment:   53 year old, right handed male presenting for definitive management of " suspect occult left elbow fracture.  Discussed advanced imaging will not be necessary to treat this injury as a nondisplaced fracture, patient understands.     Plan:   - He was placed in a TubiGrip elbow sleeve.   - Avoid lifting more than the weight of a paper  - Sling use x 1 week then discussed early ROM  - Modify activities; avoidance of lifting/heavy activity/ no lifting>3lbs with left arm.  - Ice as needed  - Follow-up with me in 2 weeks for repeat x-rays    IAdama DO, have reviewed the above note and agree with the scribe's notation as written.    Scribe Disclosure:  IMono, am serving as a scribe to document services personally performed by Adama Pizarro DO at this visit, based upon the provider's statements to me. All documentation has been reviewed by the aforementioned provider prior to being entered into the official medical record.

## 2019-04-24 NOTE — PROGRESS NOTES
"SUBJECTIVE:  Chief Complaint   Patient presents with     Urgent Care     Musculoskeletal Problem     fell while on rollerblades this afternoon.Injury to left arm.      Donn Baptiste is a 53 year old male presents with a chief complaint of left elbow pain.  The injury occurred 2 hour(s) ago.   The injury happened while rollerblading . How: foosh without pads. patient complained of moderate pain  and has had decreased ROM.  Pain exacerbated by twisting.  Relieved by rest.  He treated it initially with ice. This is the first time this type of injury has occurred to this patient.     Past Medical History:   Diagnosis Date     Allergic rhinitis      Conductive hearing loss      Depressive disorder      Hearing problem      Current Outpatient Medications   Medication Sig Dispense Refill     HYDROcodone-acetaminophen (NORCO) 5-325 MG tablet Take 1 tablet by mouth nightly as needed for breakthrough pain or severe pain 6 tablet 0     sertraline (ZOLOFT) 50 MG tablet Take 1 tablet (50 mg) by mouth daily 90 tablet 3     cetirizine (ZYRTEC) 10 MG tablet Take 1 tablet (10 mg) by mouth daily 90 tablet 3     fluticasone (FLONASE) 50 MCG/ACT spray Spray 1-2 sprays into both nostrils daily 16 g 3     IBUPROFEN PO        Social History     Tobacco Use     Smoking status: Former Smoker     Types: Cigarettes     Last attempt to quit: 2005     Years since quittin.0     Smokeless tobacco: Never Used   Substance Use Topics     Alcohol use: No     Alcohol/week: 0.0 oz       ROS:  10 point ROS negative except as listed above      EXAM:   /58   Pulse 80   Temp 97.8  F (36.6  C) (Oral)   Resp 14   Ht 1.715 m (5' 7.5\")   Wt 72.1 kg (159 lb)   BMI 24.54 kg/m    Gen: healthy,alert,no distress  Extremity: ROM in digits and shoulder intact.  elbow has point tenderness at joint.   There is not compromise to the distal circulation.  Pulses are +2 and CRT is brisk  CHEST: clear to auscultation  CV: regular rate and " rhythm  EXTREMITIES: peripheral pulses normal  SKIN: no suspicious lesions or rashes  NEURO: Normal strength and tone, sensory exam grossly normal, mentation intact and speech normal    X-ray image initially interpreted in clinic by me indicates evidence of occult radial head fracture      ASSESSMENT:   (S52.041A) Nondisplaced fracture of head of left radius, initial encounter for closed fracture  (primary encounter diagnosis)  Plan: APPLY LONG ARM SPLINT      (M25.522) Pain in left elbow  Plan: HYDROcodone-acetaminophen (NORCO) 5-325 MG         tablet, ORTHO  REFERRAL, CANCELED: XR         Elbow Left 2 Views    Red flags and emergent follow up discussed, and understood by patient  Follow up with PCP if symptoms worsen or fail to improve      Patient Instructions   Follow up immediately with numbness, cold sensation    Follow up with ortho early this week for casting       Patient Education     Elbow Fracture    You have a break, or fracture, of the elbow. That means you have a crack or break in one or more of the bones of the elbow joint. Fractures usually take 4 to 12 weeks to heal, depending on the type. Initial treatment is with a splint or cast. Severe fractures may need surgery to put the bone fragments back into place.  The elbow joint is formed by 3 arm bones:    Radius. This is the bone on the thumb side of the forearm.    Ulna. This is the bone on the little-finger side of the forearm. The ulna forms the tip of the elbow.    Humerus. This is the upper arm bone that connects to the shoulder.  Home care    Keep your arm raised to reduce pain and swelling. When sitting or lying down raise your arm above heart level. You can do this by placing your arm on a pillow that rests on your chest or on a pillow at your side. This is most important during the first 48 hours after injury.    Apply an ice pack over the injured area for 15 to 20 minutes every 3 to 6 hours. You should do this for the first 24 to  48 hours. To make an ice pack, put ice cubes in a plastic bag that seals at the top. Wrap the bag in a clean, thin towel or cloth. Never put ice or an ice pack directly on your skin. You can place the ice pack inside the sling and directly over the splint. Keep using ice packs to ease pain and swelling as needed. As the ice melts, be careful to not get your wrap, splint, or cast wet. After 48 hours, apply heat (warm shower or warm bath) for 15 to 20 minutes several times a day. Or switch between ice and heat.    If you were given a sling and splint, leave it in place for the time advised. Keep the splint completely dry at all times. Bathe with your splint out of the water protected with 2 large plastic bags, one outside of the other, each sealed with duct tape or rubber bands at the top end. If a fiberglass splint or cast gets wet, you can dry it with a hair dryer on a cool setting.    If you were given a sling only, wear it for the first week. Unless told otherwise, slowly begin range of motion exercises, after the first week, or as advised by your healthcare provider.    You may use over-the-counter pain medicine to control pain, unless another pain medicine was prescribed. If you have chronic liver or kidney disease or ever had a stomach ulcer or GI (gastrointestinal) bleeding, talk with your healthcare provider about using these medicines.  Follow-up care  Follow up with your healthcare provider, or as advised.  An elbow joint will become stiff if kept still (immobile) for too long. Ask your healthcare provider when to begin range of motion exercises to prevent the elbow from getting stiff. If X-rays were taken, you will be told if there are any new findings that may affect your care.  When to seek medical advice  Call your healthcare provider right away if any of these occur:    The plaster splint or cast becomes wet or soft    The splint or cast becomes loose    The fiberglass splint or cast remains wet for  more than 24 hours    Increased tightness or pain develops in the elbow    A bad smell comes from the cast or splint    Fingers become swollen, cold, blue, numb, or tingly  Date Last Reviewed: 4/1/2018 2000-2018 [employer]. [address] [place] [address]. All rights reserved. This information is not intended as a substitute for professional medical care. Always follow your healthcare professional's instructions.

## 2019-04-25 ENCOUNTER — ANESTHESIA EVENT (OUTPATIENT)
Dept: SURGERY | Facility: AMBULATORY SURGERY CENTER | Age: 54
End: 2019-04-25

## 2019-04-26 ENCOUNTER — HOSPITAL ENCOUNTER (OUTPATIENT)
Facility: AMBULATORY SURGERY CENTER | Age: 54
End: 2019-04-26
Attending: OTOLARYNGOLOGY
Payer: COMMERCIAL

## 2019-04-26 ENCOUNTER — ANESTHESIA (OUTPATIENT)
Dept: SURGERY | Facility: AMBULATORY SURGERY CENTER | Age: 54
End: 2019-04-26

## 2019-04-26 VITALS
OXYGEN SATURATION: 98 % | HEART RATE: 88 BPM | RESPIRATION RATE: 16 BRPM | DIASTOLIC BLOOD PRESSURE: 72 MMHG | SYSTOLIC BLOOD PRESSURE: 97 MMHG | TEMPERATURE: 98 F

## 2019-04-26 DIAGNOSIS — G89.18 POSTOPERATIVE PAIN: Primary | ICD-10-CM

## 2019-04-26 RX ORDER — HYDROCODONE BITARTRATE AND ACETAMINOPHEN 5; 325 MG/1; MG/1
1 TABLET ORAL EVERY 6 HOURS PRN
Qty: 4 TABLET | Refills: 0 | Status: SHIPPED | OUTPATIENT
Start: 2019-04-26 | End: 2019-06-05

## 2019-04-26 RX ORDER — LIDOCAINE HYDROCHLORIDE 20 MG/ML
INJECTION, SOLUTION INFILTRATION; PERINEURAL PRN
Status: DISCONTINUED | OUTPATIENT
Start: 2019-04-26 | End: 2019-04-26

## 2019-04-26 RX ORDER — SODIUM CHLORIDE, SODIUM LACTATE, POTASSIUM CHLORIDE, CALCIUM CHLORIDE 600; 310; 30; 20 MG/100ML; MG/100ML; MG/100ML; MG/100ML
INJECTION, SOLUTION INTRAVENOUS CONTINUOUS
Status: DISCONTINUED | OUTPATIENT
Start: 2019-04-26 | End: 2019-04-26 | Stop reason: HOSPADM

## 2019-04-26 RX ORDER — CLINDAMYCIN PHOSPHATE 900 MG/50ML
900 INJECTION, SOLUTION INTRAVENOUS
Status: DISCONTINUED | OUTPATIENT
Start: 2019-04-26 | End: 2019-04-26 | Stop reason: HOSPADM

## 2019-04-26 RX ORDER — PROPOFOL 10 MG/ML
INJECTION, EMULSION INTRAVENOUS CONTINUOUS PRN
Status: DISCONTINUED | OUTPATIENT
Start: 2019-04-26 | End: 2019-04-26

## 2019-04-26 RX ORDER — ACETAMINOPHEN 325 MG/1
975 TABLET ORAL ONCE
Status: COMPLETED | OUTPATIENT
Start: 2019-04-26 | End: 2019-04-26

## 2019-04-26 RX ORDER — NALOXONE HYDROCHLORIDE 0.4 MG/ML
.1-.4 INJECTION, SOLUTION INTRAMUSCULAR; INTRAVENOUS; SUBCUTANEOUS
Status: DISCONTINUED | OUTPATIENT
Start: 2019-04-26 | End: 2019-04-27 | Stop reason: HOSPADM

## 2019-04-26 RX ORDER — DEXAMETHASONE SODIUM PHOSPHATE 10 MG/ML
10 INJECTION, SOLUTION INTRAMUSCULAR; INTRAVENOUS ONCE
Status: DISCONTINUED | OUTPATIENT
Start: 2019-04-26 | End: 2019-04-26 | Stop reason: HOSPADM

## 2019-04-26 RX ORDER — LIDOCAINE 40 MG/G
CREAM TOPICAL
Status: DISCONTINUED | OUTPATIENT
Start: 2019-04-26 | End: 2019-04-26 | Stop reason: HOSPADM

## 2019-04-26 RX ORDER — ONDANSETRON 2 MG/ML
4 INJECTION INTRAMUSCULAR; INTRAVENOUS EVERY 30 MIN PRN
Status: DISCONTINUED | OUTPATIENT
Start: 2019-04-26 | End: 2019-04-27 | Stop reason: HOSPADM

## 2019-04-26 RX ORDER — CLINDAMYCIN PHOSPHATE 900 MG/50ML
INJECTION, SOLUTION INTRAVENOUS PRN
Status: DISCONTINUED | OUTPATIENT
Start: 2019-04-26 | End: 2019-04-26

## 2019-04-26 RX ORDER — SODIUM CHLORIDE, SODIUM LACTATE, POTASSIUM CHLORIDE, CALCIUM CHLORIDE 600; 310; 30; 20 MG/100ML; MG/100ML; MG/100ML; MG/100ML
INJECTION, SOLUTION INTRAVENOUS CONTINUOUS PRN
Status: DISCONTINUED | OUTPATIENT
Start: 2019-04-26 | End: 2019-04-26

## 2019-04-26 RX ORDER — OXYCODONE HYDROCHLORIDE 5 MG/1
5 TABLET ORAL EVERY 4 HOURS PRN
Status: DISCONTINUED | OUTPATIENT
Start: 2019-04-26 | End: 2019-04-27 | Stop reason: HOSPADM

## 2019-04-26 RX ORDER — HYDROCODONE BITARTRATE AND ACETAMINOPHEN 5; 325 MG/1; MG/1
1 TABLET ORAL
Status: DISCONTINUED | OUTPATIENT
Start: 2019-04-26 | End: 2019-04-27 | Stop reason: HOSPADM

## 2019-04-26 RX ORDER — ONDANSETRON 2 MG/ML
INJECTION INTRAMUSCULAR; INTRAVENOUS PRN
Status: DISCONTINUED | OUTPATIENT
Start: 2019-04-26 | End: 2019-04-26

## 2019-04-26 RX ORDER — ACETAMINOPHEN 325 MG/1
650 TABLET ORAL
Status: DISCONTINUED | OUTPATIENT
Start: 2019-04-26 | End: 2019-04-27 | Stop reason: HOSPADM

## 2019-04-26 RX ORDER — FENTANYL CITRATE 50 UG/ML
INJECTION, SOLUTION INTRAMUSCULAR; INTRAVENOUS PRN
Status: DISCONTINUED | OUTPATIENT
Start: 2019-04-26 | End: 2019-04-26

## 2019-04-26 RX ORDER — PROPOFOL 10 MG/ML
INJECTION, EMULSION INTRAVENOUS PRN
Status: DISCONTINUED | OUTPATIENT
Start: 2019-04-26 | End: 2019-04-26

## 2019-04-26 RX ORDER — FENTANYL CITRATE 50 UG/ML
25-50 INJECTION, SOLUTION INTRAMUSCULAR; INTRAVENOUS
Status: DISCONTINUED | OUTPATIENT
Start: 2019-04-26 | End: 2019-04-26 | Stop reason: HOSPADM

## 2019-04-26 RX ORDER — MEPERIDINE HYDROCHLORIDE 25 MG/ML
12.5 INJECTION INTRAMUSCULAR; INTRAVENOUS; SUBCUTANEOUS
Status: DISCONTINUED | OUTPATIENT
Start: 2019-04-26 | End: 2019-04-27 | Stop reason: HOSPADM

## 2019-04-26 RX ORDER — ONDANSETRON 4 MG/1
4 TABLET, ORALLY DISINTEGRATING ORAL EVERY 30 MIN PRN
Status: DISCONTINUED | OUTPATIENT
Start: 2019-04-26 | End: 2019-04-27 | Stop reason: HOSPADM

## 2019-04-26 RX ORDER — LIDOCAINE HYDROCHLORIDE AND EPINEPHRINE 10; 10 MG/ML; UG/ML
INJECTION, SOLUTION INFILTRATION; PERINEURAL PRN
Status: DISCONTINUED | OUTPATIENT
Start: 2019-04-26 | End: 2019-04-26 | Stop reason: HOSPADM

## 2019-04-26 RX ORDER — SODIUM CHLORIDE, SODIUM LACTATE, POTASSIUM CHLORIDE, CALCIUM CHLORIDE 600; 310; 30; 20 MG/100ML; MG/100ML; MG/100ML; MG/100ML
INJECTION, SOLUTION INTRAVENOUS CONTINUOUS
Status: DISCONTINUED | OUTPATIENT
Start: 2019-04-26 | End: 2019-04-27 | Stop reason: HOSPADM

## 2019-04-26 RX ORDER — CLINDAMYCIN PHOSPHATE 900 MG/50ML
900 INJECTION, SOLUTION INTRAVENOUS SEE ADMIN INSTRUCTIONS
Status: DISCONTINUED | OUTPATIENT
Start: 2019-04-26 | End: 2019-04-26 | Stop reason: HOSPADM

## 2019-04-26 RX ADMIN — ACETAMINOPHEN 975 MG: 325 TABLET ORAL at 08:39

## 2019-04-26 RX ADMIN — FENTANYL CITRATE 12.5 MCG: 50 INJECTION, SOLUTION INTRAMUSCULAR; INTRAVENOUS at 10:17

## 2019-04-26 RX ADMIN — FENTANYL CITRATE 25 MCG: 50 INJECTION, SOLUTION INTRAMUSCULAR; INTRAVENOUS at 09:54

## 2019-04-26 RX ADMIN — PROPOFOL 70 MCG/KG/MIN: 10 INJECTION, EMULSION INTRAVENOUS at 09:59

## 2019-04-26 RX ADMIN — SODIUM CHLORIDE, SODIUM LACTATE, POTASSIUM CHLORIDE, CALCIUM CHLORIDE: 600; 310; 30; 20 INJECTION, SOLUTION INTRAVENOUS at 09:24

## 2019-04-26 RX ADMIN — FENTANYL CITRATE 12.5 MCG: 50 INJECTION, SOLUTION INTRAMUSCULAR; INTRAVENOUS at 10:09

## 2019-04-26 RX ADMIN — OXYCODONE HYDROCHLORIDE 5 MG: 5 TABLET ORAL at 11:02

## 2019-04-26 RX ADMIN — CLINDAMYCIN PHOSPHATE 900 MG: 900 INJECTION, SOLUTION INTRAVENOUS at 09:52

## 2019-04-26 RX ADMIN — PROPOFOL 20 MG: 10 INJECTION, EMULSION INTRAVENOUS at 09:57

## 2019-04-26 RX ADMIN — ONDANSETRON 4 MG: 2 INJECTION INTRAMUSCULAR; INTRAVENOUS at 10:38

## 2019-04-26 RX ADMIN — LIDOCAINE HYDROCHLORIDE 40 MG: 20 INJECTION, SOLUTION INFILTRATION; PERINEURAL at 09:51

## 2019-04-26 RX ADMIN — PROPOFOL 20 MG: 10 INJECTION, EMULSION INTRAVENOUS at 09:58

## 2019-04-26 RX ADMIN — PROPOFOL 20 MG: 10 INJECTION, EMULSION INTRAVENOUS at 10:00

## 2019-04-26 RX ADMIN — PROPOFOL 20 MG: 10 INJECTION, EMULSION INTRAVENOUS at 09:59

## 2019-04-26 NOTE — ANESTHESIA CARE TRANSFER NOTE
Patient: Donn Baptiste    Procedure(s):  Ossteointegrated Implant with sound processor with the cochlear device-RIGHT    Diagnosis: Sensorineural Hearing Loss Of Right Ear with Unrestricted Hearing Of Left Ear  Diagnosis Additional Information: No value filed.    Anesthesia Type:   No value filed.     Note:    Patient transferred to:Phase II  Handoff Report: Identifed the Patient, Identified the Reponsible Provider, Reviewed the pertinent medical history, Discussed the surgical course, Reviewed Intra-OP anesthesia mangement and issues during anesthesia, Set expectations for post-procedure period and Allowed opportunity for questions and acknowledgement of understanding      Vitals: (Last set prior to Anesthesia Care Transfer)    CRNA VITALS  4/26/2019 1014 - 4/26/2019 1044      4/26/2019             Resp Rate (set):  10                Electronically Signed By: ISI WASHINGTON CRNA  April 26, 2019  10:44 AM

## 2019-04-26 NOTE — OP NOTE
Date of service:  4/26/19    Preoperative diagnosis:  sensorineural hearing loss    Postoperative diagnosis:  sensorineural hearing loss    Procedure:  Right osseointegrated implant with the Cochlear device    Surgeon:  Nat Camarena MD    Resident:  Torsten Leiva MD    Anesthesia:  MAC plus local    EBL:  5cc    Specimens:  None    Complications:  None    Findings:  None    Indications:  Donn Baptiste has a history of right sensorineural hearing loss.  He was evaluated and found to be a candidate for an osseointegrated implant with sound processor.  The risks and benefits were discussed with the patient and informed consent was obtained.    Procedure:  The patient was taken to the operating room, placed supine on the operating room table and general anesthesia was induced.  The patient was orally intubated without incident.  Time Out was then performed with confirmation of the patient, site and procedure.  The area behind the ear had been marked and the skin thickness measured.  1:100,000 epinephrine was injected into the area.  The area was then prepped and draped in standard surgical fashion. A 4mm punch was used to remove the skin.  A linear incision was created 1cm above and below the punch.  A cruciate incision was made in the periosteum over the area of the implant and elevated off the skull.  The 3/4mm guide drill was used to drill a 3 mm hole.  No dura was encountered.  The guard was removed and the hole was deepened to 4 mm.  The 4 mm countersink was drilled.  The 4 mm implant with 12 mm abutment was placed with good contact with the bone.  It was solid to shake test.  The skin was then closed using nylon suture.  Allevyn was placed over the skin and a healing cap placed to hold the Allevyn in position.  The patient tolerated the procedure well and there were no complications.  He was returned to anesthesia, awakened and taken to the PACU in stable condition.

## 2019-04-26 NOTE — BRIEF OP NOTE
[facility]    Brief Operative Note    Pre-operative diagnosis: Sensorineural Hearing Loss Of Right Ear with Unrestricted Hearing Of Left Ear  Post-operative diagnosis * No post-op diagnosis entered *  Procedure: Procedure(s):  Right Bone Anchored Hearing Aid Placement  Surgeon: Surgeon(s) and Role:     * Nat Camarena MD - Primary  Anesthesia: Combined MAC with Local   Estimated blood loss: Minimal  Drains: None  Specimens: * No specimens in log *  Findings:   see op note.  Complications: None.  Implants:    Implant Name Type Inv. Item Serial No.  Lot No. LRB No. Used   Cochlear Lizette Bundle      Right 1   [id number] Implant 4mm with Abutment 12mm    COCHLEAR ROMANA [id number] Right 1

## 2019-04-26 NOTE — DISCHARGE INSTRUCTIONS
1.  No getting the incision wet until the dressing is removed in clinic.    3.  Take pain medication as needed.    4.  No strenuous activity for one week.    5.  Follow up with Dr. Camarena as previously scheduled.    6.  Please call the ENT clinic if the incision drains, becomes more painful over time, becomes redder over time and the redness spreads or for fever > 101.5.  If it is the evening or weekend, please call [phone] and ask for the ENT resident on call.  [facility]  Home Care Following Anesthesia  For 24 hours after surgery:  1. Get plenty of rest.  A responsible adult must stay with you for at least 24 hours after you leave the surgery center.  2. Do not drive or use heavy equipment.  If you have weakness or tingling, don't drive or use heavy equipment until this feeling goes away.   3. Do not drink alcohol.   4. Avoid strenuous or risky activities.  Ask for help when climbing stairs.  5. You may feel lightheaded.  IF so, sit for a few minutes before standing.  Have someone help you get up.   6. If you have nausea (feel sick to your stomach): Drink only clear liquids such as apple juice, ginger ale, broth or 7-Up.  Rest may also help.  Be sure to drink enough fluids.  Move to a regular diet as you feel able.   7. You may have a slight fever.  Call the doctor if your fever is over 100 F (37.7 C) (taken under the tongue) or lasts longer than 24 hours.  8. You may have a dry mouth, a sore throat, muscle aches or trouble sleeping. These should go away after 24 hours.  9. Do not make important or legal decisions.               Tips for taking pain medications  To get the best pain relief possible, remember these points:    Take pain medications as directed, before pain becomes severe.    Pain medication can upset your stomach: taking it with food may help.    Constipation is a common side effect of pain medication. Drink plenty of  fluids.    Eat foods high in fiber.  Take a stool softener if recommended by your doctor or pharmacist.    Do not drink alcohol, drive or operate machinery while taking pain medications.    Ask about other ways to control pain, such as with heat, ice or relaxation.    Tylenol/Acetaminophen Consumption  To help encourage the safe use of acetaminophen, the makers of TYLENOL  have lowered the maximum daily dose for single-ingredient Extra Strength TYLENOL  (acetaminophen) products sold in the [place] from 8 pills per day (4,000 mg) to 6 pills per day (3,000 mg). The dosing interval has also changed from 2 pills every 4-6 hours to 2 pills every 6 hours.    If you feel your pain relief is insufficient, you may take Tylenol/Acetaminophen in addition to your narcotic pain medication.     Be careful not to exceed 3,000 mg of Tylenol/Acetaminophen in a 24 hour period from all sources.    If you are taking extra strength Tylenol/acetaminophen (500 mg), the maximum dose is 6 tablets in 24 hours.    If you are taking regular strength acetaminophen (325 mg), the maximum dose is 9 tablets in 24 hours.    Call a doctor for any of the followin. Signs of infection (fever, growing tenderness at the surgery site, a large amount of drainage or bleeding, severe pain, foul-smelling drainage, redness, swelling).  2. It has been over 8 to 10 hours since surgery and you are still not able to urinate (pass water).  3. Headache for over 24 hours.  Your doctor is:  Dr. Nat Camarena, ENT Otolaryngology: [phone]                 Or dial [phone] and ask for the resident on call for:  ENT Otolaryngology  For emergency care, call the:  [facility]:  [phone] (TTY for hearing impaired: [phone])

## 2019-04-26 NOTE — ANESTHESIA POSTPROCEDURE EVALUATION
Anesthesia POST Procedure Evaluation    Patient: Donn Baptiste   MRN:     [id number] Gender:   male   Age:    53 year old :      1965        Preoperative Diagnosis: Sensorineural Hearing Loss Of Right Ear with Unrestricted Hearing Of Left Ear   Procedure(s):  Ossteointegrated Implant with sound processor with the cochlear device-RIGHT   Postop Comments: No value filed.       Anesthesia Type:  MAC  No value filed.    Reportable Event: NO     PAIN: Uncomplicated   Sign Out status: Comfortable, Well controlled pain     PONV: No PONV   Sign Out status:  No Nausea or Vomiting     Neuro/Psych: Uneventful perioperative course   Sign Out Status: Preoperative baseline; Age appropriate mentation     Airway/Resp.: Uneventful perioperative course   Sign Out Status: Non labored breathing, age appropriate RR; Resp. Status within EXPECTED Parameters     CV: Uneventful perioperative course   Sign Out status: Appropriate BP and perfusion indices; Appropriate HR/Rhythm     Disposition:   Sign Out in:  PACU  Disposition:  Phase II; Home  Recovery Course: Uneventful  Follow-Up: Not required           Last Anesthesia Record Vitals:  CRNA VITALS  2019 1014 - 2019 1114      2019             Resp Rate (set):  10          Last PACU Vitals:  Vitals Value Taken Time   BP 89/47 2019 10:38 AM   Temp 36.4  C (97.5  F) 2019 10:38 AM   Pulse     Resp 14 2019 10:38 AM   SpO2 98 % 2019 10:38 AM   Temp src     NIBP 107/63 2019 10:29 AM   Pulse 74 2019 10:33 AM   SpO2 95 % 2019 10:33 AM   Resp     Temp     Ht Rate 71 2019 10:34 AM   Temp 2           Electronically Signed By: Praveen Colbert MD, 2019, 11:57 AM

## 2019-04-26 NOTE — ANESTHESIA PREPROCEDURE EVALUATION
Anesthesia Pre-Procedure Evaluation    Patient: Donn Baptiste   MRN:     [id number] Gender:   male   Age:    53 year old :      1965        Preoperative Diagnosis: Sensorineural Hearing Loss Of Right Ear with Unrestricted Hearing Of Left Ear   Procedure(s):  Right Bone Anchored Hearing Aid Placement     Past Medical History:   Diagnosis Date     Allergic rhinitis      Conductive hearing loss      Depressive disorder      Hearing problem       History reviewed. No pertinent surgical history.       Anesthesia Evaluation     . Pt has had prior anesthetic.     No history of anesthetic complications          ROS/MED HX    ENT/Pulmonary:     (+)allergic rhinitis, , . .    Neurologic:     (+)other neuro conductive hearing loss    Cardiovascular:  - neg cardiovascular ROS   (+) ----. : . . . :. . Previous cardiac testing date:results:date: results:ECG reviewed date:19 results:Sinus ryhthm date: results:          METS/Exercise Tolerance:  >4 METS   Hematologic:  - neg hematologic  ROS       Musculoskeletal:   (+) fracture upper extremity,  -       GI/Hepatic:  - neg GI/hepatic ROS       Renal/Genitourinary:  - ROS Renal section negative       Endo:  - neg endo ROS       Psychiatric:     (+) psychiatric history depression      Infectious Disease:  - neg infectious disease ROS       Malignancy:      - no malignancy   Other:    (+) no H/O Chronic Pain,                       PHYSICAL EXAM:   Mental Status/Neuro: A/A/O   Airway: Facies: Feasible  Mallampati: II  Mouth/Opening: Full  TM distance: > 6 cm  Neck ROM: Full   Respiratory: Auscultation: CTAB     Resp. Rate: Normal     Resp. Effort: Normal      CV: Rhythm: Regular  Rate: Age appropriate  Heart: Normal Sounds   Comments:      Dental:  Dental Comments: None loose                Lab Results   Component Value Date    WBC 6.6 2019    HGB 15.4 2019    HCT 44.5 2019     2019     2019    POTASSIUM 4.1 2019     "CHLORIDE 107 01/13/2019    CO2 25 01/13/2019    BUN 12 01/13/2019    CR 0.72 01/13/2019     (H) 01/13/2019    CELIA 8.4 (L) 01/13/2019       Preop Vitals  BP Readings from Last 3 Encounters:   04/26/19 117/83   04/20/19 100/58   04/15/19 115/74    Pulse Readings from Last 3 Encounters:   04/26/19 88   04/20/19 80   04/15/19 83      Resp Readings from Last 3 Encounters:   04/26/19 16   04/23/19 16   04/20/19 14    SpO2 Readings from Last 3 Encounters:   04/26/19 97%   04/15/19 96%   01/13/19 96%      Temp Readings from Last 1 Encounters:   04/26/19 36.4  C (97.5  F) (Temporal)    Ht Readings from Last 1 Encounters:   04/23/19 1.715 m (5' 7.5\")      Wt Readings from Last 1 Encounters:   04/23/19 72.1 kg (159 lb)    Estimated body mass index is 24.54 kg/m  as calculated from the following:    Height as of 4/23/19: 1.715 m (5' 7.5\").    Weight as of 4/23/19: 72.1 kg (159 lb).     LDA:  Peripheral IV 04/26/19 Right Hand (Active)   Site Assessment WDL 4/26/2019  8:47 AM   Line Status Infusing 4/26/2019  8:47 AM   Phlebitis Scale 0-->no symptoms 4/26/2019  8:47 AM   Infiltration Scale 0 4/26/2019  8:47 AM   Extravasation? No 4/26/2019  8:47 AM   Number of days: 0            Assessment:   ASA SCORE: 1    NPO Status: > 6 hours since completed Solid Foods   Documentation: H&P complete; Preop Testing complete; Consents complete   Proceeding: Proceed without further delay  Tobacco Use:  NO Active use of Tobacco/UNKNOWN Tobacco use status     Plan:   Anes. Type:  MAC   Pre-Induction: Midazolam IV   Induction:  IV (Standard)   Airway: Native Airway   Access/Monitoring: PIV   Maintenance: Propofol; IV   Emergence: Procedure Site   Logistics: Same Day Surgery     Postop Pain/Sedation Strategy:  Standard-Options: Opioids PRN     PONV Management:  Adult Risk Factors:, Non-Smoker, Postop Opioids  Prevention: Propofol Infusion; Ondansetron; Dexamethasone     CONSENT: Direct conversation   Plan and risks discussed with: Patient "   Blood Products: Consent Deferred (Minimal Blood Loss)                         Praveen Colbert MD

## 2019-05-02 ENCOUNTER — OFFICE VISIT (OUTPATIENT)
Dept: OTOLARYNGOLOGY | Facility: CLINIC | Age: 54
End: 2019-05-02
Payer: COMMERCIAL

## 2019-05-02 VITALS
WEIGHT: 160 LBS | OXYGEN SATURATION: 97 % | HEART RATE: 73 BPM | SYSTOLIC BLOOD PRESSURE: 120 MMHG | BODY MASS INDEX: 24.69 KG/M2 | DIASTOLIC BLOOD PRESSURE: 77 MMHG

## 2019-05-02 DIAGNOSIS — H90.41 SENSORINEURAL HEARING LOSS (SNHL) OF RIGHT EAR WITH UNRESTRICTED HEARING OF LEFT EAR: Primary | ICD-10-CM

## 2019-05-02 ASSESSMENT — PAIN SCALES - GENERAL: PAINLEVEL: MILD PAIN (3)

## 2019-05-02 NOTE — PATIENT INSTRUCTIONS
Thank you for choosing  [facility].      [phone] appointment scheduling option 1 and nurse advice option 3.

## 2019-05-02 NOTE — PROGRESS NOTES
The patient is here for a postoperative visit after placement of a right osseointegrated implant.  There have been no unexpected concerns since surgery.    Physical examination:  The healing cap and dressing was removed.  Sutures removed.  The scalp is healing well with no open areas.    Assessment and plan:  Mykelmonserrat XAVIER Baptiste is healing as expected after placement of an osseointegrated implant.  Follow-up appointment in 3 weeks.  Pt may not begin brushing the abutment.

## 2019-05-02 NOTE — LETTER
5/2/2019      RE: Donn Baptiste  [address]  [facility] [address]       The patient is here for a postoperative visit after placement of a right osseointegrated implant.  There have been no unexpected concerns since surgery.    Physical examination:  The healing cap and dressing was removed.  Sutures removed.  The scalp is healing well with no open areas.    Assessment and plan:  Donn Baptiste is healing as expected after placement of an osseointegrated implant.  Follow-up appointment in 3 weeks.  Pt may not begin brushing the abutment.      Nat Camarena MD

## 2019-05-07 ENCOUNTER — OFFICE VISIT (OUTPATIENT)
Dept: ORTHOPEDICS | Facility: CLINIC | Age: 54
End: 2019-05-07
Payer: COMMERCIAL

## 2019-05-07 ENCOUNTER — ANCILLARY PROCEDURE (OUTPATIENT)
Dept: GENERAL RADIOLOGY | Facility: CLINIC | Age: 54
End: 2019-05-07
Attending: FAMILY MEDICINE
Payer: COMMERCIAL

## 2019-05-07 VITALS
WEIGHT: 159 LBS | SYSTOLIC BLOOD PRESSURE: 120 MMHG | HEIGHT: 68 IN | BODY MASS INDEX: 24.1 KG/M2 | DIASTOLIC BLOOD PRESSURE: 77 MMHG

## 2019-05-07 DIAGNOSIS — S52.125D CLOSED NONDISPLACED FRACTURE OF HEAD OF LEFT RADIUS WITH ROUTINE HEALING, SUBSEQUENT ENCOUNTER: ICD-10-CM

## 2019-05-07 DIAGNOSIS — M25.522 PAIN IN JOINT OF LEFT ELBOW: ICD-10-CM

## 2019-05-07 DIAGNOSIS — M25.522 PAIN IN JOINT OF LEFT ELBOW: Primary | ICD-10-CM

## 2019-05-07 ASSESSMENT — MIFFLIN-ST. JEOR: SCORE: 1522.78

## 2019-05-07 NOTE — LETTER
"5/7/2019       RE: Donn Baptiste  [address]  [facility] [address]     Dear Colleague,    Thank you for referring your patient, Donn Baptiste, to the [facility] at [facility]. Please see a copy of my visit note below.          SPORTS & ORTHOPEDIC WALK-IN FOLLOW-UP VISIT 5/7/2019    Interval History:     Follow up reason: Left elbow pain    Date of injury: 4/20/19    Date last seen: 4/23/19    Following Therapeutic Plan: Yes     Pain: Improving    Function: Improving      Medical History:    Any recent changes to your medical history? No    Any new medication prescribed since last visit? No    Review of Systems:    Do you have fever, chills, weight loss? No    Do you have any vision problems? No    Do you have any chest pain or edema? No    Do you have any shortness of breath or wheezing?  No    Do you have stomach problems? No    Do you have any numbness or focal weakness? No    Do you have diabetes? No    Do you have problems with bleeding or clotting? No    Do you have an rashes or other skin lesions? No         HISTORY OF PRESENT ILLNESS  Mr. Baptiste is a pleasant 53 year old year old male following up with a radial head fracture of his left elbow.  Kenneht has been doing great over the past week.  He took his sling off about 3 or 4 days ago.  He is nearly pain-free, his range of motion is improving.     PHYSICAL EXAM  Vitals:    05/07/19 1752   BP: 120/77   Weight: 72.1 kg (159 lb)   Height: 1.715 m (5' 7.5\")     General  - normal appearance, in no obvious distress  CV  - normal radial pulse  Pulm  - normal respiratory pattern, non-labored  Musculoskeletal - left elbow  - inspection: normal joint alignment, no obvious deformity, no swelling  - palpation: no bony or soft tissue tenderness  - ROM:  160 deg flexion   Lacks 5 deg extension   90 deg pronation   90 deg supination  - strength: 5/5  strength, 5/5 flexion, " extension, pronation, supination    Neuro  - no sensory or motor deficit, grossly normal coordination, normal muscle tone  Skin  - no ecchymosis, erythema, warmth, or induration, no obvious rash  Psych  - interactive, appropriate, normal mood and affect    ASSESSMENT & PLAN  Mr. Baptiste is a 53 year old year old male following up with a left radial head fracture.    I ordered & reviewed an x-ray of his left elbow which does show healing of the fracture fragment left radial head with some resolution of his left elbow effusion.    At this point I do think it is reasonable to continue to be without the sling and splint and encourage range of motion.  Kenneth does know that he may have some continued pain over the next 2 to 3 weeks, this is to be expected.  His range of motion will improve over the next month or so.    Kenneth can follow-up as needed or if his pain or range of motion issues are not resolving.    It was a pleasure seeing Kenneth.    Adama Amos DO, CAM

## 2019-05-07 NOTE — PROGRESS NOTES
"      SPORTS & ORTHOPEDIC WALK-IN FOLLOW-UP VISIT 5/7/2019    Interval History:     Follow up reason: Left elbow pain    Date of injury: 4/20/19    Date last seen: 4/23/19    Following Therapeutic Plan: Yes     Pain: Improving    Function: Improving      Medical History:    Any recent changes to your medical history? No    Any new medication prescribed since last visit? No    Review of Systems:    Do you have fever, chills, weight loss? No    Do you have any vision problems? No    Do you have any chest pain or edema? No    Do you have any shortness of breath or wheezing?  No    Do you have stomach problems? No    Do you have any numbness or focal weakness? No    Do you have diabetes? No    Do you have problems with bleeding or clotting? No    Do you have an rashes or other skin lesions? No         HISTORY OF PRESENT ILLNESS  Mr. Baptiste is a pleasant 53 year old year old male following up with a radial head fracture of his left elbow.  Kenneth has been doing great over the past week.  He took his sling off about 3 or 4 days ago.  He is nearly pain-free, his range of motion is improving.     PHYSICAL EXAM  Vitals:    05/07/19 1752   BP: 120/77   Weight: 72.1 kg (159 lb)   Height: 1.715 m (5' 7.5\")     General  - normal appearance, in no obvious distress  CV  - normal radial pulse  Pulm  - normal respiratory pattern, non-labored  Musculoskeletal - left elbow  - inspection: normal joint alignment, no obvious deformity, no swelling  - palpation: no bony or soft tissue tenderness  - ROM:  160 deg flexion   Lacks 5 deg extension   90 deg pronation   90 deg supination  - strength: 5/5  strength, 5/5 flexion, extension, pronation, supination    Neuro  - no sensory or motor deficit, grossly normal coordination, normal muscle tone  Skin  - no ecchymosis, erythema, warmth, or induration, no obvious rash  Psych  - interactive, appropriate, normal mood and affect        ASSESSMENT & PLAN  Mr. Baptiste is a 53 year old year old male " following up with a left radial head fracture.    I ordered & reviewed an x-ray of his left elbow which does show healing of the fracture fragment left radial head with some resolution of his left elbow effusion.    At this point I do think it is reasonable to continue to be without the sling and splint and encourage range of motion.  Kenneth does know that he may have some continued pain over the next 2 to 3 weeks, this is to be expected.  His range of motion will improve over the next month or so.    Kenneth can follow-up as needed or if his pain or range of motion issues are not resolving.    It was a pleasure seeing Kenneth.        Adama Amos DO, CAQSM

## 2019-05-14 ENCOUNTER — TRANSFERRED RECORDS (OUTPATIENT)
Dept: HEALTH INFORMATION MANAGEMENT | Facility: CLINIC | Age: 54
End: 2019-05-14

## 2019-06-05 ENCOUNTER — OFFICE VISIT (OUTPATIENT)
Dept: OTOLARYNGOLOGY | Facility: CLINIC | Age: 54
End: 2019-06-05
Payer: COMMERCIAL

## 2019-06-05 VITALS
BODY MASS INDEX: 23.7 KG/M2 | WEIGHT: 151 LBS | HEART RATE: 90 BPM | HEIGHT: 67 IN | DIASTOLIC BLOOD PRESSURE: 62 MMHG | SYSTOLIC BLOOD PRESSURE: 102 MMHG

## 2019-06-05 DIAGNOSIS — H90.41 SENSORINEURAL HEARING LOSS (SNHL) OF RIGHT EAR WITH UNRESTRICTED HEARING OF LEFT EAR: Primary | ICD-10-CM

## 2019-06-05 ASSESSMENT — PAIN SCALES - GENERAL: PAINLEVEL: NO PAIN (0)

## 2019-06-05 ASSESSMENT — MIFFLIN-ST. JEOR: SCORE: 1483.68

## 2019-06-05 NOTE — PATIENT INSTRUCTIONS
Thank you for choosing [facility] today.    Please follow-up with Dr. Camarena as needed.    If you have any further questions or concerns, call us at [phone].

## 2019-06-05 NOTE — LETTER
6/5/2019      RE: Donn Baptiste  [address]  [place] [facility] [address]       Mykellillymonika Baptiste is here for a 2nd postoperative visit after a right osseointegrated implant placement.  There have not been concerns since the last visit.    Physical examination:  Scalp is healed.  Abutment is solid.    Assessment and plan:  Healing appropriately.  Pt may begin brushing the abutment.  Follow-up with Audiology in 2 months for processor loading.      Nat Camarena MD

## 2019-06-05 NOTE — PROGRESS NOTES
Donn Baptiste is here for a 2nd postoperative visit after a right osseointegrated implant placement.  There have not been concerns since the last visit.    Physical examination:  Scalp is healed.  Abutment is solid.    Assessment and plan:  Healing appropriately.  Pt may begin brushing the abutment.  Follow-up with Audiology in 2 months for processor loading.

## 2019-06-05 NOTE — NURSING NOTE
"Chief Complaint   Patient presents with     RECHECK     second post op    .Blood pressure 102/62, pulse 90, height 1.71 m (5' 7.32\"), weight 68.5 kg (151 lb).    Leo Ching LPN    "

## 2019-06-24 DIAGNOSIS — T85.79XA INFECTION ASSOCIATED WITH IMPLANT (H): Primary | ICD-10-CM

## 2019-06-24 RX ORDER — MUPIROCIN 20 MG/G
OINTMENT TOPICAL
Qty: 1 G | Refills: 0 | Status: SHIPPED | OUTPATIENT
Start: 2019-06-24 | End: 2019-07-04

## 2019-06-24 RX ORDER — SULFAMETHOXAZOLE/TRIMETHOPRIM 800-160 MG
1 TABLET ORAL 2 TIMES DAILY
Qty: 20 TABLET | Refills: 0 | Status: SHIPPED | OUTPATIENT
Start: 2019-06-24 | End: 2019-07-04

## 2019-07-18 ENCOUNTER — OFFICE VISIT (OUTPATIENT)
Dept: OTOLARYNGOLOGY | Facility: CLINIC | Age: 54
End: 2019-07-18
Payer: COMMERCIAL

## 2019-07-18 VITALS
DIASTOLIC BLOOD PRESSURE: 75 MMHG | HEART RATE: 85 BPM | BODY MASS INDEX: 24.2 KG/M2 | OXYGEN SATURATION: 95 % | SYSTOLIC BLOOD PRESSURE: 113 MMHG | WEIGHT: 156 LBS

## 2019-07-18 DIAGNOSIS — H90.41 SENSORINEURAL HEARING LOSS (SNHL) OF RIGHT EAR WITH UNRESTRICTED HEARING OF LEFT EAR: Primary | ICD-10-CM

## 2019-07-18 ASSESSMENT — PAIN SCALES - GENERAL: PAINLEVEL: NO PAIN (0)

## 2019-07-18 NOTE — PROGRESS NOTES
Donn Baptiste is seen for his three month visit after a right BAHA placement. He denies any issues with the abutment site. His wife cleaned off some crusty debris yesterday but he denies any pain, drainage, fevers, chills, or other symptoms.    Physical examination:  male in no acute distress.  Alert and answering questions appropriately.  HB 1/6 bilaterally. The right ear shows a normal auricle, EAC patent with normal epithelium, TM clear without perforation or effusion on handheld microscopy. The abutment site is clean and dry, no fluctuance or erythema, no expressible fluid. The abutment is well integrated into the skull by shake test and slightly tilted from perpendicular but there is enough clearance for the processor.    Assessment and plan:  Right BAHA abutment is healing well. He has an appointment in a week and a half with audiology to receive his processor. He can follow up in clinic as needed if he has any concern.     Cheikh Cevallos MD  Otolaryngology-Head & Neck Surgery PGY-2    I, Nat Camarena, saw this patient with the resident/fellow and agree with the resident/fellow s findings and plan of care as documented in the resident s/fellow s note.    Nat Camarena MD

## 2019-07-18 NOTE — PATIENT INSTRUCTIONS
1. You were seen in the ENT Clinic today by .  If you have any questions or concerns after your appointment, please call   - Option 1: ENT Clinic: [phone]  - Option 2: [facility] ('s Nurse): [phone]    2.   Plan to return to clinic as needed.     TANK Teague  [facility]olaryng[facility]  [phone]

## 2019-07-18 NOTE — LETTER
7/18/2019      RE: Donn Baptiste  [address]  [place] [facility] [place] [address]       Donn Baptiste is seen for his three month visit after a right BAHA placement. He denies any issues with the abutment site. His wife cleaned off some crusty debris yesterday but he denies any pain, drainage, fevers, chills, or other symptoms.    Physical examination:  male in no acute distress.  Alert and answering questions appropriately.  HB 1/6 bilaterally. The right ear shows a normal auricle, EAC patent with normal epithelium, TM clear without perforation or effusion on handheld microscopy. The abutment site is clean and dry, no fluctuance or erythema, no expressible fluid. The abutment is well integrated into the skull by shake test and slightly tilted from perpendicular but there is enough clearance for the processor.    Assessment and plan:  Right BAHA abutment is healing well. He has an appointment in a week and a half with audiology to receive his processor. He can follow up in clinic as needed if he has any concern.     Cheikh Cevallos MD  Otolaryngology-Head & Neck Surgery PGY-2    I, Nat Camarena, saw this patient with the resident/fellow and agree with the resident/fellow s findings and plan of care as documented in the resident s/fellow s note.    Nat Camarena MD

## 2019-07-29 ENCOUNTER — OFFICE VISIT (OUTPATIENT)
Dept: AUDIOLOGY | Facility: CLINIC | Age: 54
End: 2019-07-29
Payer: COMMERCIAL

## 2019-07-29 DIAGNOSIS — H90.41 SENSORINEURAL HEARING LOSS (SNHL) OF RIGHT EAR WITH UNRESTRICTED HEARING OF LEFT EAR: Primary | ICD-10-CM

## 2019-07-29 NOTE — PROGRESS NOTES
AUDIOLOGY REPORT    BACKGROUND INFORMATION: Donn Baptiste, 53 year old male, was seen in the Audiology Clinic at the [facility] on 7/29/2019. This appointment was for the fitting of a right Cochlear BAHA 5 standard bone anchored processor to be used with an osseointegrated implant placed by Dr Nat Camarena on 4/26/2019.  Previous evaluations have revealed essentially normal hearing in the left ear and a sensorineural hearing loss in the right ear with poor word recognition. The patient does not have hearing that is aidable by a traditional hearing aid and is using the implant for the purpose of single-sided deafness.    TEST RESULTS AND PROCEDURES:  The surgical site was inspected and no drainage, swelling, or redness was indicated.  A shake test indicated that good osseointegration had occurred.  A check of the screw indicated that it was tight.     Due to a problem with obtaining patient's personal device for today's appointment, he was fit with a clinic device to be used until his personal device arrives. Patient was in agreement with the plan so that he would not have to wait any longer before using the device.    The device was connected to the computer and the feedback  was performed. In-Situ bone conduction audiometry was performed and the device was programmed. The patient was happy with the sound quality and volume with the initial settings. A second program with significantly less volume was added (essentially to use as a mute function).  He practiced changing between programs in office.    Patient also practiced putting the device on and taking the device off of the abutment. He reported that he felt comfortable doing that at home.  A review of how the device works was performed (on/off, batteries -size 312, general cleaning, etc...) and he expressed understanding. He was provided a guidebook for the device should he have additional  questions.    The device was paired with his phone. A review of streaming as well as how the dalia works was performed and he expressed understanding.    SUMMARY AND RECOMMENDATIONS: Donn Baptiste was fit with a right bone anchored processor to be used with an osseointegrated implant for the purpose of single-sided deafness. The patient will return on 8/15/19 to be fit with his personal device, to make any necessary adjustments, and to answer any questions or concerns that he may have. Today's appointment is a no charge visit as he was not fit with his personal device.  Patient will be charged for the fitting when he returns on 8/15/19 and leaves with his own device. Call this clinic with questions regarding these results or recommendations.        Isra Maloney  Audiologist  [place] License  #[id number]

## 2019-08-15 ENCOUNTER — OFFICE VISIT (OUTPATIENT)
Dept: AUDIOLOGY | Facility: CLINIC | Age: 54
End: 2019-08-15
Payer: COMMERCIAL

## 2019-08-15 DIAGNOSIS — H90.41 SENSORINEURAL HEARING LOSS (SNHL) OF RIGHT EAR WITH UNRESTRICTED HEARING OF LEFT EAR: Primary | ICD-10-CM

## 2019-08-15 NOTE — PROGRESS NOTES
AUDIOLOGY REPORT    SUBJECTIVE:Donn Baptiste is a 53 year old male who was seen in the Audiology Clinic at the [facility] on 8/15/2019  for a follow-up check regarding the fitting of new hearing aids. Previous results have revealed normal hearing in the left ear and a sensorineural hearing loss in the right ear with poor word recognition.  The patient has been seen previously in this clinic and was fit with a right Cochlear BAHA 5 standard bone anchored processor to be used with an osseointegrated implant on 7/29/19. She was surgically implanted with the abutment by Dr. Nat Camarena on 4/26/19.  Donn reports that he is seeing great benefit with the device and reports good continued sound quality.     OBJECTIVE:   He returned the demo device and was fit with his personal processor (SN:[id number]). He reported good sound quality with his personal device. The ReSound MiniMic was paired and a review of how to use it was performed.  The BAHA 5 was also paired with his iPhone again. He was provided instructions for sending in device registration forms and he expressed understanding.    No adjustments were made to the programming as he is happy with the sound quality.    Reviewed care, cleaning (no water, dry brush), batteries (size 312) insertion/removal, toxicity, low-battery signal), aid insertion/removal, volume adjustment (if applicable), user booklet, warranty information, storage cases, and other hearing aid details.     No charge visit today as it was a return visit to change out processor devices due to an error.     ASSESSMENT: A follow-up appointment for hearing aid fitting was completed today. Changes to hearing aid was completed as outlined above.     PLAN: Donn will return for follow-up as needed.  Please call this clinic with any questions regarding today s appointment.    Isra Maloney  Audiologist  [place] License  #[id number]       Juan Perez is a 20 year old male presenting with consult for tic  Denies known Latex allergy or symptoms of Latex sensitivity  Medications verified, no changes  .  History   Smoking Status   • Never Smoker   Smokeless Tobacco   • Never Used       REVIEW OF SYSTEMS:  CONSTITUTIONAL:  Pt denies acute weight change, fever or chills, appetite problems, but complains of fatigue  EYES: Pt denies recent changes in vision, double vision, blind spots.    EARS, NOSE & THROAT: Pt denies hearing problems, ear pain, ringing in the ears, throat irritation, hoarseness, swallowing problems  HEAD and NECK: Pt denies any incidences of serious trauma or injury  LUNGS: Pt denies shortness of breath  HEART: Pt denies Any cardiac problems, chest pain or tightness, heart murmurs, palpitations, irregular heartbeat  GI: Pt denies nausea, vomiting, indigestion, abdominal pain, diarrhea, blood in stool  MUSCULOSKELETAL: Pt denies  painful or swollen joints, neck pain, back pain, pain in limbs, muscle wasting, muscle twitching, muscle stiffness,  but complains of involuntary movement  SKIN: Pt denies rashes, itching  PSYCHOLOGICAL: Pt denies problems with depression, sleep problems, but complains of anxiety  HEMATOLOGY: Pt denies anemia, lymphadenopathy, bleeding problems, easy bruising, clotting problems

## 2019-10-01 ENCOUNTER — HEALTH MAINTENANCE LETTER (OUTPATIENT)
Age: 54
End: 2019-10-01

## 2019-12-18 ENCOUNTER — OFFICE VISIT (OUTPATIENT)
Dept: FAMILY MEDICINE | Facility: CLINIC | Age: 54
End: 2019-12-18
Payer: COMMERCIAL

## 2019-12-18 VITALS
RESPIRATION RATE: 16 BRPM | SYSTOLIC BLOOD PRESSURE: 120 MMHG | BODY MASS INDEX: 24.77 KG/M2 | TEMPERATURE: 97.9 F | WEIGHT: 157.8 LBS | HEART RATE: 84 BPM | DIASTOLIC BLOOD PRESSURE: 75 MMHG | HEIGHT: 67 IN | OXYGEN SATURATION: 97 %

## 2019-12-18 DIAGNOSIS — Z00.00 ROUTINE GENERAL MEDICAL EXAMINATION AT A HEALTH CARE FACILITY: ICD-10-CM

## 2019-12-18 DIAGNOSIS — L98.9 SKIN LESION: ICD-10-CM

## 2019-12-18 DIAGNOSIS — F33.0 MAJOR DEPRESSIVE DISORDER, RECURRENT EPISODE, MILD (H): ICD-10-CM

## 2019-12-18 DIAGNOSIS — Z86.018 HISTORY OF ACOUSTIC NEUROMA: ICD-10-CM

## 2019-12-18 DIAGNOSIS — Z80.0 FAMILY HISTORY OF COLON CANCER: ICD-10-CM

## 2019-12-18 DIAGNOSIS — Z13.9 SCREENING FOR CONDITION: Primary | ICD-10-CM

## 2019-12-18 LAB
CHOLEST SERPL-MCNC: 208.7 MG/DL (ref 0–200)
CHOLEST/HDLC SERPL: 4.3 {RATIO} (ref 0–5)
GLUCOSE P FAST SERPL-MCNC: 72 MG/DL (ref 51–109)
HDLC SERPL-MCNC: 48.9 MG/DL
LDLC SERPL CALC-MCNC: 139 MG/DL (ref 0–129)
TRIGL SERPL-MCNC: 104.6 MG/DL (ref 0–150)
VLDL CHOLESTEROL: 20.9 MG/DL (ref 7–32)

## 2019-12-18 ASSESSMENT — MIFFLIN-ST. JEOR: SCORE: 1509.41

## 2019-12-18 NOTE — PATIENT INSTRUCTIONS
Preventive Health Recommendations  Male Ages 50 - 64    Yearly exam:             See your health care provider every year in order to  o   Review health changes.   o   Discuss preventive care.    o   Review your medicines if your doctor has prescribed any.     Have a cholesterol test every 5 years, or more frequently if you are at risk for high cholesterol/heart disease.     Have a diabetes test (fasting glucose) every three years. If you are at risk for diabetes, you should have this test more often.     Have a colonoscopy at age 50, or have a yearly FIT test (stool test). These exams will check for colon cancer.      Talk with your health care provider about whether or not a prostate cancer screening test (PSA) is right for you.    You should be tested each year for STDs (sexually transmitted diseases), if you re at risk.     Shots: Get a flu shot each year. Get a tetanus shot every 10 years.     Nutrition:    Eat at least 5 servings of fruits and vegetables daily.     Eat whole-grain bread, whole-wheat pasta and brown rice instead of white grains and rice.     Get adequate Calcium and Vitamin D.     Lifestyle    Exercise for at least 150 minutes a week (30 minutes a day, 5 days a week). This will help you control your weight and prevent disease.     Limit alcohol to one drink per day.     No smoking.     Wear sunscreen to prevent skin cancer.     See your dentist every six months for an exam and cleaning.     See your eye doctor every 1 to 2 years.         instructions.

## 2019-12-18 NOTE — PROGRESS NOTES
Male Physical Note          HPI         Concerns today: No special concerns today.      Patient Active Problem List   Diagnosis     Seasonal allergic rhinitis     Major depressive disorder, recurrent episode, mild (H)     Family history of colon cancer     Chemical dependency (H)       Past Medical History:   Diagnosis Date     Allergic rhinitis      Conductive hearing loss      Depressive disorder      Hearing problem        Previous Medical Care     Family History   Problem Relation Age of Onset     Cancer - colorectal Father      Substance Abuse Father      Cancer Father      Diabetes Sister      Mental Illness Sister      Depression Sister      Diabetes Sister      Thyroid Disease Sister      Migraines Sister      Asthma No family hx of      Hypertension No family hx of      C.A.D. No family hx of      Cerebrovascular Disease No family hx of        The 10-year ASCVD risk score (Ceci WEN Jr., et al., 2013) is: 5.4%    Values used to calculate the score:      Age: 53 years      Sex: Male      Is Non- : No      Diabetic: No      Tobacco smoker: No      Systolic Blood Pressure: 120 mmHg      Is BP treated: No      HDL Cholesterol: 48.9 mg/dL      Total Cholesterol: 208.7 mg/dL         Review of Systems:     Review of Systems:  CONSTITUTIONAL: NEGATIVE for fever, chills, change in weight  INTEGUMENTARY/SKIN: lots of different looking skin lesions, not sure what they are  EYES: NEGATIVE for vision changes or irritation  ENT/MOUTH: NEGATIVE for ear, mouth and throat problems  RESP: NEGATIVE for significant cough or SOB  BREAST: NEGATIVE for masses, tenderness or discharge  CV: NEGATIVE for chest pain, palpitations or peripheral edema  GI: NEGATIVE for nausea, abdominal pain, heartburn, or change in bowel habits  : NEGATIVE for frequency, dysuria, or hematuria  MUSCULOSKELETAL: NEGATIVE for significant arthralgias or myalgia  NEURO: NEGATIVE for weakness, dizziness or paresthesias  ENDOCRINE:  NEGATIVE for temperature intolerance, skin/hair changes  HEME/ALLERGY: NEGATIVE for bleeding problems  PSYCHIATRIC: NEGATIVE for changes in mood or affect. PHQ 9 score today is 0  Sleep:   Do you snore most or the night (as reported by a family member)? No  Do you feel sleepy or extremely tired during most of the day? No           Social History     Social History     Socioeconomic History     Marital status:      Spouse name: Not on file     Number of children: 1     Years of education: Not on file     Highest education level: Not on file   Occupational History     Employer: [employer]   Social Needs     Financial resource strain: Not on file     Food insecurity:     Worry: Not on file     Inability: Not on file     Transportation needs:     Medical: Not on file     Non-medical: Not on file   Tobacco Use     Smoking status: Former Smoker     Types: Cigarettes     Last attempt to quit: 2005     Years since quittin.6     Smokeless tobacco: Never Used   Substance and Sexual Activity     Alcohol use: No     Alcohol/week: 0.0 standard drinks     Drug use: No     Sexual activity: Yes     Partners: Female     Birth control/protection: Male Surgical     Comment: Vasectomy    Lifestyle     Physical activity:     Days per week: Not on file     Minutes per session: Not on file     Stress: Not on file   Relationships     Social connections:     Talks on phone: Not on file     Gets together: Not on file     Attends Muslim service: Not on file     Active member of club or organization: Not on file     Attends meetings of clubs or organizations: Not on file     Relationship status: Not on file     Intimate partner violence:     Fear of current or ex partner: Not on file     Emotionally abused: Not on file     Physically abused: Not on file     Forced sexual activity: Not on file   Other Topics Concern     Parent/sibling w/ CABG, MI or angioplasty before 65F 55M? 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       Marital Status:  Who lives in your household? Wife and son, age 13    Has anyone hurt you physically, for example by pushing, hitting, slapping or kicking you or forcing you to have sex? Denies  Do you feel threatened or controlled by a partner, ex-partner or anyone in your life? Denies    Sexual Health     Sexual concerns: No   STI History: Neg      Recommended Screening     Cholesterol Level (>46 yo or at risk):  Recommended and patient accepted testing., ASA use (>3% risk in 5 y):  Testing not indicated  and HIV screening:  Recommended and patient accepted testing.  Colon CA Screening (>50-75 ):  Recommended and patient accepted testing. and Lung CA Screening with low dose CT (55 - 80, with >= 30 ppy smoking history who are current smokers or quit within last 15y - annual low dose CT) defer to next visit         Physical Exam:     Vitals: /75   Pulse 84   Temp 97.9  F (36.6  C) (Oral)   Resp 16   Ht 1.702 m (5' 7\")   Wt 71.6 kg (157 lb 12.8 oz)   SpO2 97%   BMI 24.71 kg/m      GENERAL: healthy, alert and no distress  EYES: Eyes grossly normal to inspection, extraocular movements - intact, and PERRL  HENT: ear canals- normal; TMs- normal; Nose- normal; Mouth- no ulcers, no lesions. Dentition fair.  NECK: no tenderness, no adenopathy, no asymmetry, no masses, no stiffness; thyroid- normal to palpation  RESP: lungs clear to auscultation - no rales, no rhonchi, no wheezes  CV: regular rates and rhythm, normal S1 S2, no S3 or S4 and no murmur, no click or rub -  ABDOMEN: soft, no tenderness, no  hepatosplenomegaly, no masses, normal bowel sounds  MS: extremities- no gross deformities noted, no edema  SKIN: red hair/freckled skin. Multiple AK's  NEURO: strength and tone- normal, sensory exam- grossly normal, mentation- intact, speech- normal, reflexes- symmetric  BACK: no CVA tenderness, no paralumbar tenderness  PSYCH: Alert and " oriented times 3; speech- coherent , normal rate and volume; able to articulate logical thoughts, able to abstract reason, no tangential thoughts, no hallucinations or delusions, affect- normal  LYMPHATICS: ant. cervical- normal, post. cervical- normal, axillary- normal, supraclavicular- normal, inguinal- normal    Assessment and Plan     Donn was seen today for physical and refill request.    Diagnoses and all orders for this visit:    Routine general medical examination at a health care facility    Screening for condition        -     Lipid Panel ([facility])  -     Hepatitis C antibody  -     Gucose Fasting ([facility])  -     HIV Antigen Antibody Combo    Major depressive disorder, recurrent episode, mild (H). Stable  -     Refill sertraline (ZOLOFT) 50 MG tablet; Take 1 tablet (50 mg) by mouth daily    Family history colon cancer         -     Last colonoscopy 2017, recommend repeat 3 years - order placed    Skin lesions/AK's  -     DERMATOLOGY REFERRAL - INTERNAL - for complete skin exam    H/o acoustic neuroma         -     Treated with gamma knife. Lost hearing on R. Doing well with acoustic implant. Routine follow-up in place.      Options for treatment and follow-up care were reviewed with the patient. Donn Baptiste and/or guardian engaged in the decision making process and verbalized understanding of the options discussed and agreed with the final plan.    Sheila Mantilla MD

## 2019-12-19 LAB
HCV AB SERPL QL IA: NONREACTIVE
HIV 1+2 AB+HIV1 P24 AG SERPL QL IA: NONREACTIVE

## 2020-01-30 ENCOUNTER — OFFICE VISIT (OUTPATIENT)
Dept: DERMATOLOGY | Facility: CLINIC | Age: 55
End: 2020-01-30
Attending: FAMILY MEDICINE
Payer: COMMERCIAL

## 2020-01-30 DIAGNOSIS — D48.9 NEOPLASM OF UNCERTAIN BEHAVIOR: Primary | ICD-10-CM

## 2020-01-30 DIAGNOSIS — L82.1 SEBORRHEIC KERATOSIS: ICD-10-CM

## 2020-01-30 DIAGNOSIS — D22.9 MULTIPLE BENIGN NEVI: ICD-10-CM

## 2020-01-30 DIAGNOSIS — D23.9 DERMATOFIBROMA: ICD-10-CM

## 2020-01-30 ASSESSMENT — PAIN SCALES - GENERAL
PAINLEVEL: NO PAIN (0)
PAINLEVEL: NO PAIN (0)

## 2020-01-30 NOTE — LETTER
1/30/2020       RE: Donn Baptiste  [address]  [place] [address]     Dear Colleague,    Thank you for referring your patient, Donn Baptiste, to the [facility] DERMATOLOGY at [facility]. Please see a copy of my visit note below.    [facility] Dermatology Note      Dermatology Problem List:  # Neoplasm of uncertain behavior, L upper back, s/p bx 1/30/2020.    Encounter Date: Jan 30, 2020    CC:  Chief Complaint   Patient presents with     Skin Check     skin check - concerned about various spots. No known family hx of melanoma         History of Present Illness:  Mr. Donn Baptiste is a 54 year old male who presents today for a FBSE. He was last seen by Dr. Rayo in 2011 where a non symptomatic SKs and cherry angiomas were found. Today he notes multiple raised spots on his arms and back. These are asymptomatic. No other concerns today. No new, painful, bleeding, or changing lesions. Patient is otherwise feeling well, no additional skin concerns. No personal or family history of skin cancer.       Past Medical History:   Patient Active Problem List   Diagnosis     Seasonal allergic rhinitis     Major depressive disorder, recurrent episode, mild (H)     Family history of colon cancer     Chemical dependency (H)     Past Medical History:   Diagnosis Date     Allergic rhinitis      Conductive hearing loss      Depressive disorder      Hearing problem      Past Surgical History:   Procedure Laterality Date     IMPLANT BAHA Right 4/26/2019    Procedure: Ossteointegrated Implant with sound processor with the cochlear device-RIGHT;  Surgeon: Nat Camarena MD;  Location:  OR       Social History:  Patient reports that he quit smoking about 14 years ago. His smoking use included cigarettes. He has never used smokeless tobacco. He reports that he does not drink alcohol or use drugs.  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    Family History:  Family History   Problem Relation Age of Onset     Cancer - colorectal Father      Substance Abuse Father      Cancer Father      Diabetes Sister      Mental Illness Sister      Depression Sister      Diabetes Sister      Thyroid Disease Sister      Migraines Sister      Asthma No family hx of      Hypertension No family hx of      C.A.D. No family hx of      Cerebrovascular Disease No family hx of        Medications:  Current Outpatient Medications   Medication Sig Dispense Refill     cetirizine (ZYRTEC) 10 MG tablet Take 1 tablet (10 mg) by mouth daily 90 tablet 3     fluticasone (FLONASE) 50 MCG/ACT spray Spray 1-2 sprays into both nostrils daily 16 g 3     IBUPROFEN PO        sertraline (ZOLOFT) 50 MG tablet Take 1 tablet (50 mg) by mouth daily 90 tablet 3       Allergies   Allergen Reactions     Amoxicillin Rash     Urticarial reaction       Review of Systems:  -Skin New Pt: The patient denies any rash, pruritus, or lesions that are symptomatic, changing or bleeding, except as per HPI.  -Constitutional: The patient is feeling generally well.    Physical exam:  GEN: This is a well developed, well-nourished male in no acute distress, in a pleasant mood.    SKIN: Total skin excluding the undergarment areas was performed. The exam included the head/face, neck, both arms, chest, back, abdomen, both legs, digits and/or nails.   - L upper elbow ~8 mm hypopigmented macule with peripheral reticulated pigment   - L upper back 8 mm medium brown macule with irregular pigment network and slight peripheral hypopigmentation.  - Multiple regular brown pigmented macules and papules are identified on the trunk and extremities.   - There are several waxy stuck on tan to brown papule on the back and arms.  - No other lesions of concern on areas examined.           Impression/Plan:  1. NUB, L upper back. Ddx: Dysplastic nevus v SK v melanoma.   - Shave biopsy:  After discussion of benefits  and risks including but not limited to bleeding/bruising, pain/swelling, infection, scar, incomplete removal, nerve damage/numbness, recurrence, and non-diagnostic biopsy, written consent, verbal consent and photographs were obtained. Time-out was performed. The area was cleaned with isopropyl alcohol. 0.5ml of 1% lidocaine with 1:100,000 epinephrine was injected to obtain adequate anesthesia. A shave biopsy was performed. Hemostasis was achieved with aluminium chloride. Vaseline and a sterile dressing were applied. The patient tolerated the procedure and no complications were noted. The patient was provided with verbal and written post care instructions.    2. Seborrheic keratosis, non irritated. Discussed the natural history and benign nature of this lesion. Reassurance provided that no additional treatment is necessary.   - No further intervention required. Patient to report changes.     3. Dermatofibroma, L arm. Discussed the natural history and benign nature of this lesion. Reassurance provided that no additional treatment is necessary.     4. Multiple benign nevi. Counseled on ABCDEs of melanoma and sun protection. Asked patient to return sooner if noticing changing or symptomatic lesions.      Follow-up in 1 year, earlier for new or changing lesions.       Staff Involved:    Scribe Disclosure  I, Janet Murrell, am serving as a scribe to document services personally performed by Dr. Perla Weldon MD, based on data collection and the provider's statements to me.     Provider Disclosure:   The documentation recorded by the scribe accurately reflects the services I personally performed and the decisions made by me.    Perla Weldon MD    Department of Dermatology  [facility]: Phone: [phone], Fax: [phone]

## 2020-01-31 NOTE — NURSING NOTE
Dermatology Rooming Note    Donn Baptiste's goals for this visit include:   Chief Complaint   Patient presents with     Skin Check     skin check - concerned about various spots. No known family hx of melanoma     Yanira Saucedo, EMT

## 2020-01-31 NOTE — PROGRESS NOTES
[facility] Dermatology Note      Dermatology Problem List:  # Neoplasm of uncertain behavior, L upper back, s/p bx 1/30/2020.    Encounter Date: Jan 30, 2020    CC:  Chief Complaint   Patient presents with     Skin Check     skin check - concerned about various spots. No known family hx of melanoma         History of Present Illness:  Mr. Donn Baptiste is a 54 year old male who presents today for a FBSE. He was last seen by Dr. Rayo in 2011 where a non symptomatic SKs and cherry angiomas were found. Today he notes multiple raised spots on his arms and back. These are asymptomatic. No other concerns today. No new, painful, bleeding, or changing lesions. Patient is otherwise feeling well, no additional skin concerns. No personal or family history of skin cancer.       Past Medical History:   Patient Active Problem List   Diagnosis     Seasonal allergic rhinitis     Major depressive disorder, recurrent episode, mild (H)     Family history of colon cancer     Chemical dependency (H)     Past Medical History:   Diagnosis Date     Allergic rhinitis      Conductive hearing loss      Depressive disorder      Hearing problem      Past Surgical History:   Procedure Laterality Date     IMPLANT BAHA Right 4/26/2019    Procedure: Ossteointegrated Implant with sound processor with the cochlear device-RIGHT;  Surgeon: Nat Camarena MD;  Location:  OR       Social History:  Patient reports that he quit smoking about 14 years ago. His smoking use included cigarettes. He has never used smokeless tobacco. He reports that he does not drink alcohol or use drugs.  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    Family History:  Family History   Problem Relation Age of Onset     Cancer - colorectal Father      Substance Abuse Father      Cancer Father      Diabetes Sister      Mental Illness Sister      Depression Sister      Diabetes Sister      Thyroid Disease Sister       Migraines Sister      Asthma No family hx of      Hypertension No family hx of      C.A.D. No family hx of      Cerebrovascular Disease No family hx of        Medications:  Current Outpatient Medications   Medication Sig Dispense Refill     cetirizine (ZYRTEC) 10 MG tablet Take 1 tablet (10 mg) by mouth daily 90 tablet 3     fluticasone (FLONASE) 50 MCG/ACT spray Spray 1-2 sprays into both nostrils daily 16 g 3     IBUPROFEN PO        sertraline (ZOLOFT) 50 MG tablet Take 1 tablet (50 mg) by mouth daily 90 tablet 3       Allergies   Allergen Reactions     Amoxicillin Rash     Urticarial reaction       Review of Systems:  -Skin New Pt: The patient denies any rash, pruritus, or lesions that are symptomatic, changing or bleeding, except as per HPI.  -Constitutional: The patient is feeling generally well.    Physical exam:  GEN: This is a well developed, well-nourished male in no acute distress, in a pleasant mood.    SKIN: Total skin excluding the undergarment areas was performed. The exam included the head/face, neck, both arms, chest, back, abdomen, both legs, digits and/or nails.   - L upper elbow ~8 mm hypopigmented macule with peripheral reticulated pigment   - L upper back 8 mm medium brown macule with irregular pigment network and slight peripheral hypopigmentation.  - Multiple regular brown pigmented macules and papules are identified on the trunk and extremities.   - There are several waxy stuck on tan to brown papule on the back and arms.  - No other lesions of concern on areas examined.           Impression/Plan:  1. NUB, L upper back. Ddx: Dysplastic nevus v SK v melanoma.   - Shave biopsy:  After discussion of benefits and risks including but not limited to bleeding/bruising, pain/swelling, infection, scar, incomplete removal, nerve damage/numbness, recurrence, and non-diagnostic biopsy, written consent, verbal consent and photographs were obtained. Time-out was performed. The area was cleaned with  isopropyl alcohol. 0.5ml of 1% lidocaine with 1:100,000 epinephrine was injected to obtain adequate anesthesia. A shave biopsy was performed. Hemostasis was achieved with aluminium chloride. Vaseline and a sterile dressing were applied. The patient tolerated the procedure and no complications were noted. The patient was provided with verbal and written post care instructions.    2. Seborrheic keratosis, non irritated. Discussed the natural history and benign nature of this lesion. Reassurance provided that no additional treatment is necessary.   - No further intervention required. Patient to report changes.     3. Dermatofibroma, L arm. Discussed the natural history and benign nature of this lesion. Reassurance provided that no additional treatment is necessary.     4. Multiple benign nevi. Counseled on ABCDEs of melanoma and sun protection. Asked patient to return sooner if noticing changing or symptomatic lesions.      Follow-up in 1 year, earlier for new or changing lesions.       Staff Involved:    Scribe Disclosure  I, Janet Murrell, am serving as a scribe to document services personally performed by Dr. Perla Weldon MD, based on data collection and the provider's statements to me.     Provider Disclosure:   The documentation recorded by the scribe accurately reflects the services I personally performed and the decisions made by me.    Perla Weldon MD    Department of Dermatology  [facility]: Phone: [phone], Fax: [phone]

## 2020-01-31 NOTE — NURSING NOTE
Lidocaine-epinephrine 1-1:620225 % injection   1mL once for one use, starting 1/30/2020 ending 1/30/2020,  2mL disp, R-0, injection  Injected by Yanira Saucedo EMT

## 2020-01-31 NOTE — PATIENT INSTRUCTIONS
We will check spot on back today.    Everything else looks great!    The spot(s) on the arms and back are called seborrheic keratosis. This is a harmless thickening of the skin.    Return in 1 year, sooner if concerns.    Wound Care After a Biopsy    What is a skin biopsy?  A skin biopsy allows the doctor to examine a very small piece of tissue under the microscope to determine the diagnosis and the best treatment for the skin condition. A local anesthetic (numbing medicine)  is injected with a very small needle into the skin area to be tested. A small piece of skin is taken from the area. Sometimes a suture (stitch) is used.     What are the risks of a skin biopsy?  I will experience scar, bleeding, swelling, pain, crusting and redness. I may experience incomplete removal or recurrence. Risks of this procedure are excessive bleeding, bruising, infection, nerve damage, numbness, thick (hypertrophic or keloidal) scar and non-diagnostic biopsy.    How should I care for my wound for the first 24 hours?    Keep the wound dry and covered for 24 hours    If it bleeds, hold direct pressure on the area for 15 minutes. If bleeding does not stop then go to the emergency room    Avoid strenuous exercise the first 1-2 days or as your doctor instructs you    How should I care for the wound after 24 hours?    After 24 hours, remove the bandage    You may bathe or shower as normal    If you had a scalp biopsy, you can shampoo as usual and can use shower water to clean the biopsy site daily    Clean the wound twice a day with gentle soap and water    Do not scrub, be gentle    Apply white petroleum/Vaseline after cleaning the wound with a cotton swab or a clean finger, and keep the site covered with a Bandaid /bandage. Bandages are not necessary with a scalp biopsy    If you are unable to cover the site with a Bandaid /bandage, re-apply ointment 2-3 times a day to keep the site moist. Moisture will help with healing    Avoid  strenuous activity for first 1-2 days    Avoid lakes, rivers, pools, and oceans until the stitches are removed or the site is healed    How do I clean my wound?    Wash hands thoroughly with soap or use hand  before all wound care    Clean the wound with gentle soap and water    Apply white petroleum/Vaseline  to wound after it is clean    Replace the Bandaid /bandage to keep the wound covered for the first few days or as instructed by your doctor    If you had a scalp biopsy, warm shower water to the area on a daily basis should suffice    What should I use to clean my wound?     Cotton-tipped applicators (Qtips )    White petroleum jelly (Vaseline ). Use a clean new container and use Q-tips to apply.    Bandaids   as needed    Gentle soap     How should I care for my wound long term?    Do not get your wound dirty    Keep up with wound care for one week or until the area is healed.    A small scab will form and fall off by itself when the area is completely healed. The area will be red and will become pink in color as it heals. Sun protection is very important for how your scar will turn out. Sunscreen with an SPF 30 or greater is recommended once the area is healed.    If you have stitches, stitches need to be removed in 14 days. You may return to our clinic for this or you may have it done locally at your doctor s office.    You should have some soreness but it should be mild and slowly go away over several days. Talk to your doctor about using tylenol for pain,    When should I call my doctor?  If you have increased:     Pain or swelling    Pus or drainage (clear or slightly yellow drainage is ok)    Temperature over 100F    Spreading redness or warmth around wound    When will I hear about my results?  The biopsy results can take 2-3 weeks to come back. The clinic will call you with the results, send you a [employer] message, or have you schedule a follow-up clinic or phone time to discuss the results.  Contact our clinics if you do not hear from us in 3 weeks.     Who should I call with questions?    [facility]: [phone]     [facility]: [phone]    For urgent needs outside of business hours call the [facility] at [phone] and ask for the dermatology resident on call

## 2020-02-05 LAB — COPATH REPORT: NORMAL

## 2020-02-06 ENCOUNTER — TELEPHONE (OUTPATIENT)
Dept: DERMATOLOGY | Facility: CLINIC | Age: 55
End: 2020-02-06

## 2020-02-06 NOTE — TELEPHONE ENCOUNTER
Called patient to discuss results, no answer. LVM for patient to call back. Per Dr. Young, patient can be scheduled for consult for MOHS. Call back number provided. [employer] message sent to patient as well.    Rupinder Cottrell LPN

## 2020-02-10 NOTE — TELEPHONE ENCOUNTER
FUTURE VISIT INFORMATION      FUTURE VISIT INFORMATION:    Date: 2.12.20    Time: 2:00    Location:  DermSurg  REFERRAL INFORMATION:    Referring provider:  Dr. Weldon    Referring providers clinic:  [facility]    Reason for visit/diagnosis  left upper back- Malignant melanoma, superficial spreading type    RECORDS REQUESTED FROM:       Clinic name Comments Records Status Photos Status   [facility] 1.30.20 Dr. Weldon  Path #  Epic Epic

## 2020-02-12 ENCOUNTER — PRE VISIT (OUTPATIENT)
Dept: DERMATOLOGY | Facility: CLINIC | Age: 55
End: 2020-02-12

## 2020-02-25 ENCOUNTER — OFFICE VISIT (OUTPATIENT)
Dept: DERMATOLOGY | Facility: CLINIC | Age: 55
End: 2020-02-25
Payer: COMMERCIAL

## 2020-02-25 DIAGNOSIS — C43.59 MALIGNANT MELANOMA OF UPPER BACK (H): Primary | ICD-10-CM

## 2020-02-25 ASSESSMENT — PATIENT HEALTH QUESTIONNAIRE - PHQ9: SUM OF ALL RESPONSES TO PHQ QUESTIONS 1-9: 0

## 2020-02-25 ASSESSMENT — PAIN SCALES - GENERAL: PAINLEVEL: NO PAIN (0)

## 2020-02-25 NOTE — LETTER
2/25/2020       RE: Donn Baptiste  [address]  [place] [address]     Dear Colleague,    Thank you for referring your patient, Donn Baptiste, to the [facility] DERMATOLOGIC SURGERY at [facility]. Please see a copy of my visit note below.    [facility] Dermatology Note    Dermatology Surgery Clinic  [facility]  [address] [place] [id number]    Dermatology Problem List:  1. Malignant melanoma, Breslow depth 0.4mm, L upper back; s/p Bx 1/30/2020    Encounter Date: Feb 25, 2020    CC:  Chief Complaint   Patient presents with     Consult     Donn is here today for a consult for a malignany melanoma on the back.        History of Present Illness:  Mr. Donn Baptiste is a 54 year old male who presents today for an excision consultation regarding a malignant melanoma of the L upper back. The patient was referred by Dr. Weldon of  Derm. He saw Dr. Weldon on 1/30/20 for FBSE. At the time he had a lesion of concern on the L upper back that was shave biopsied and indicated malignant melanoma. Today the patient mainly has questions about scheduling for Mohs. No prior excision done. The patient is otherwise feeling well. There are no other skin concerns at this time.      Past Medical History:   Patient Active Problem List   Diagnosis     Seasonal allergic rhinitis     Major depressive disorder, recurrent episode, mild (H)     Family history of colon cancer     Chemical dependency (H)     Past Medical History:   Diagnosis Date     Allergic rhinitis      Conductive hearing loss      Depressive disorder      Hearing problem      Past Surgical History:   Procedure Laterality Date     IMPLANT BAHA Right 4/26/2019    Procedure: Ossteointegrated Implant with sound processor with the cochlear device-RIGHT;  Surgeon: Nat Camarena MD;  Location:  OR       Social History:  Social  History     Socioeconomic History     Marital status:      Spouse name: Not on file     Number of children: 1     Years of education: Not on file     Highest education level: Not on file   Occupational History     Employer: [employer]   Social Needs     Financial resource strain: Not on file     Food insecurity:     Worry: Not on file     Inability: Not on file     Transportation needs:     Medical: Not on file     Non-medical: Not on file   Tobacco Use     Smoking status: Former Smoker     Types: Cigarettes     Last attempt to quit: 2005     Years since quittin.8     Smokeless tobacco: Never Used   Substance and Sexual Activity     Alcohol use: No     Alcohol/week: 0.0 standard drinks     Drug use: No     Sexual activity: Yes     Partners: Female     Birth control/protection: Male Surgical     Comment: Vasectomy    Lifestyle     Physical activity:     Days per week: Not on file     Minutes per session: Not on file     Stress: Not on file   Relationships     Social connections:     Talks on phone: Not on file     Gets together: Not on file     Attends Church service: Not on file     Active member of club or organization: Not on file     Attends meetings of clubs or organizations: Not on file     Relationship status: Not on file     Intimate partner violence:     Fear of current or ex partner: Not on file     Emotionally abused: Not on file     Physically abused: Not on file     Forced sexual activity: Not on file   Other Topics Concern     Parent/sibling w/ CABG, MI or angioplasty before 65F 55M? 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       Family History:  Family History   Problem Relation Age of Onset     Cancer - colorectal Father      Substance Abuse Father      Cancer Father      Diabetes Sister      Mental Illness Sister      Depression Sister      Diabetes Sister      Thyroid Disease Sister      Migraines Sister       Asthma No family hx of      Hypertension No family hx of      C.A.D. No family hx of      Cerebrovascular Disease No family hx of         Medications:  Current Outpatient Medications   Medication Sig Dispense Refill     cetirizine (ZYRTEC) 10 MG tablet Take 1 tablet (10 mg) by mouth daily 90 tablet 3     fluticasone (FLONASE) 50 MCG/ACT spray Spray 1-2 sprays into both nostrils daily 16 g 3     IBUPROFEN PO        sertraline (ZOLOFT) 50 MG tablet Take 1 tablet (50 mg) by mouth daily 90 tablet 3       Allergies   Allergen Reactions     Amoxicillin Rash     Urticarial reaction       Review of Systems:  As per HPI.  -Skin Establ Pt: The patient denies any new rash, pruritus, or lesions that are symptomatic, changing or bleeding, except as per HPI.  -Constitutional: The patient is feeling generally well.    Physical exam:  Vitals: There were no vitals taken for this visit.  GEN: This is a well developed, well-nourished male in no acute distress, in a pleasant mood.    SKIN: Focused examination of the face/head, neck, and back were performed.  - 7.0 mm scaly pink papule on the L upper back  - No other lesions of concern on areas examined.       Impression/Plan:  1. Malignant melanoma (T1a), superficial spreading type, Breslow depth 0.4 mm, mitotic rate 0, L upper back, 7.0 mm    Reviewed pathology report and nature of diagnosis.     Risks, benefits, and process of excision surgery were discussed including possibility of damage to surrounding anatomical structures and infection. Patient is comfortable proceeding with the surgery; consent form was obtained. He will be scheduled at his convenience.    No indication for pre-op antibiotics.    Pre-op written instructions provided.     Follow-up as scheduled for excision.      Staff Involved:    Scribe Disclosure  I, Maria R Avila, am serving as a scribe to document services personally performed by Dr. Praveen Young, based on data collection and the provider's statements to me.      Attending Attestation  I attest that the Scribe recorded the interview and exam that I personally performed.  I have reviewed the note and edited it as necessary.    Praveen Young M.D.  Professor  Director of Dermatologic Surgery  Department of Dermatology  [facility]

## 2020-02-25 NOTE — NURSING NOTE
Chief Complaint   Patient presents with     Consult     Donn is here today for a consult for a malignany melanoma on the back.        Joelle Odonnell, CMA

## 2020-02-25 NOTE — PROGRESS NOTES
[facility] Dermatology Note    Dermatology Surgery Clinic  [facility]  Clinics and Surgery Center  [address] [place] [id number]    Dermatology Problem List:  1. Malignant melanoma, Breslow depth 0.4mm, L upper back; s/p Bx 1/30/2020    Encounter Date: Feb 25, 2020    CC:  Chief Complaint   Patient presents with     Consult     Donn is here today for a consult for a malignany melanoma on the back.        History of Present Illness:  Mr. Donn Baptiste is a 54 year old male who presents today for an excision consultation regarding a malignant melanoma of the L upper back. The patient was referred by Dr. Weldon of  Derm. He saw Dr. Weldon on 1/30/20 for FBSE. At the time he had a lesion of concern on the L upper back that was shave biopsied and indicated malignant melanoma. Today the patient mainly has questions about scheduling for Mohs. No prior excision done. The patient is otherwise feeling well. There are no other skin concerns at this time.      Past Medical History:   Patient Active Problem List   Diagnosis     Seasonal allergic rhinitis     Major depressive disorder, recurrent episode, mild (H)     Family history of colon cancer     Chemical dependency (H)     Past Medical History:   Diagnosis Date     Allergic rhinitis      Conductive hearing loss      Depressive disorder      Hearing problem      Past Surgical History:   Procedure Laterality Date     IMPLANT BAHA Right 4/26/2019    Procedure: Ossteointegrated Implant with sound processor with the cochlear device-RIGHT;  Surgeon: Nat Camarena MD;  Location:  OR       Social History:  Social History     Socioeconomic History     Marital status:      Spouse name: Not on file     Number of children: 1     Years of education: Not on file     Highest education level: Not on file   Occupational History     Employer: [employer]   Social Needs     Financial resource strain: Not on file      Food insecurity:     Worry: Not on file     Inability: Not on file     Transportation needs:     Medical: Not on file     Non-medical: Not on file   Tobacco Use     Smoking status: Former Smoker     Types: Cigarettes     Last attempt to quit: 2005     Years since quittin.8     Smokeless tobacco: Never Used   Substance and Sexual Activity     Alcohol use: No     Alcohol/week: 0.0 standard drinks     Drug use: No     Sexual activity: Yes     Partners: Female     Birth control/protection: Male Surgical     Comment: Vasectomy    Lifestyle     Physical activity:     Days per week: Not on file     Minutes per session: Not on file     Stress: Not on file   Relationships     Social connections:     Talks on phone: Not on file     Gets together: Not on file     Attends Yarsanism service: Not on file     Active member of club or organization: Not on file     Attends meetings of clubs or organizations: Not on file     Relationship status: Not on file     Intimate partner violence:     Fear of current or ex partner: Not on file     Emotionally abused: Not on file     Physically abused: Not on file     Forced sexual activity: Not on file   Other Topics Concern     Parent/sibling w/ CABG, MI or angioplasty before 65F 55M? 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       Family History:  Family History   Problem Relation Age of Onset     Cancer - colorectal Father      Substance Abuse Father      Cancer Father      Diabetes Sister      Mental Illness Sister      Depression Sister      Diabetes Sister      Thyroid Disease Sister      Migraines Sister      Asthma No family hx of      Hypertension No family hx of      C.A.D. No family hx of      Cerebrovascular Disease No family hx of         Medications:  Current Outpatient Medications   Medication Sig Dispense Refill     cetirizine (ZYRTEC) 10 MG tablet Take 1 tablet (10 mg) by mouth daily 90 tablet 3      fluticasone (FLONASE) 50 MCG/ACT spray Spray 1-2 sprays into both nostrils daily 16 g 3     IBUPROFEN PO        sertraline (ZOLOFT) 50 MG tablet Take 1 tablet (50 mg) by mouth daily 90 tablet 3       Allergies   Allergen Reactions     Amoxicillin Rash     Urticarial reaction       Review of Systems:  As per HPI.  -Skin Establ Pt: The patient denies any new rash, pruritus, or lesions that are symptomatic, changing or bleeding, except as per HPI.  -Constitutional: The patient is feeling generally well.    Physical exam:  Vitals: There were no vitals taken for this visit.  GEN: This is a well developed, well-nourished male in no acute distress, in a pleasant mood.    SKIN: Focused examination of the face/head, neck, and back were performed.  - 7.0 mm scaly pink papule on the L upper back  - No other lesions of concern on areas examined.       Impression/Plan:  1. Malignant melanoma (T1a), superficial spreading type, Breslow depth 0.4 mm, mitotic rate 0, L upper back, 7.0 mm    Reviewed pathology report and nature of diagnosis.     Risks, benefits, and process of excision surgery were discussed including possibility of damage to surrounding anatomical structures and infection. Patient is comfortable proceeding with the surgery; consent form was obtained. He will be scheduled at his convenience.    No indication for pre-op antibiotics.    Pre-op written instructions provided.     Follow-up as scheduled for excision.      Staff Involved:    Scribe Disclosure  I, Maria R Austin, am serving as a scribe to document services personally performed by Dr. Praveen Young, based on data collection and the provider's statements to me.     Attending Attestation  I attest that the Scribe recorded the interview and exam that I personally performed.  I have reviewed the note and edited it as necessary.    Praveen Young M.D.  Professor  Director of Dermatologic Surgery  Department of Dermatology  [facility]

## 2020-03-02 ENCOUNTER — OFFICE VISIT (OUTPATIENT)
Dept: DERMATOLOGY | Facility: CLINIC | Age: 55
End: 2020-03-02
Payer: COMMERCIAL

## 2020-03-02 VITALS — HEART RATE: 89 BPM | DIASTOLIC BLOOD PRESSURE: 72 MMHG | SYSTOLIC BLOOD PRESSURE: 108 MMHG

## 2020-03-02 DIAGNOSIS — C43.59 MALIGNANT MELANOMA OF UPPER BACK (H): Primary | ICD-10-CM

## 2020-03-02 ASSESSMENT — PAIN SCALES - GENERAL: PAINLEVEL: NO PAIN (0)

## 2020-03-02 NOTE — PATIENT INSTRUCTIONS

## 2020-03-02 NOTE — PROGRESS NOTES
DERMATOLOGIC EXCISION SURGERY PROCEDURE NOTE     Dermatology Surgery Clinic  [facility]  [address] [place] [address]    Dermatology Problem List:  1. Malignant melanoma, Breslow depth 0.4mm, L upper back; s/p Bx 1/30/2020; s/p excision 3/2/20    Date: March 2, 2020     NAME OF PROCEDURE: Excision with complex linear closure  Surgeon: Praveen Young MD  Fellow: Mino Armas MD  Resident: Holden Ordonez MD  PRE-OPERATIVE DIAGNOSIS:  Melanoma  POST-OPERATIVE DIAGNOSIS: Same   FINAL EXCISION SIZE(EXCISION DEFECT SIZE): 2.7 cm, with 1.0 cm margin   FINAL REPAIR LENGTH: 8.2 cm   INDICATIONS: This patient presented with a 0.7 cm melanoma of the L upper back. Excision was indicated.   We discussed the principles of treatment and most likely complications including bleeding, infection, scarring, alteration in skin color and sensation, wound dehiscence,muscle weakness in the area, or recurrence of the lesion or disease. We reviewed that on occasion, after healing, a secondary procedure or revision may be recommended in order to obtain the best cosmetic or functional result.     PROCEDURE: The patient was taken to the operative suite. Time-out was performed. The treatment area was anesthetized with 1% lidocaine and epinephrine (1:100,000). The area was washed with Hibiclens, rinsed with saline and draped with sterile towels. The lesion was delineated and excised down to deep subcutaneous fat in an elliptical manner. Hemostasis was obtained by electrocoagulation.     REPAIR: A complex layered linear closure was selected as the procedure which would maximally preserve both function and cosmesis and for the following reasons: Extensive undermining (equal to or greater than the maximum perpendicular width of the defect), exposure of underlying structure (bone, cartilage, tendon, named neurovascular), free margin involvement (helical rim, vermillion border, nostril  rim), and/or placement of retention sutures.     The patient was taken to the operative suite and placed supine on the operating room table. The defect was identified. Appropriate markings were made with a marking pen to plan the repair. The area was infiltrated with Lidocaine 1% with epi 1:100,000 and prepped with Hibiclens and draped with sterile towels.     The wound was debeveled and undermined widely. Cones were excised within relaxed skin tension lines on both sides of the defect. Hemostasis was obtained using electrocoagulation. Subcutaneous tissues were then approximated using 4-0 Monocryl buried vertical mattress sutures. The wound edges were then approximated additional 4-0 Monocryl buried sutures were placed in a similar fashion where needed. Percutaneous simple running 4-0 Prolene sutures were carefully placed for maximum eversion and meticulous approximation.    The final wound length was 8.2 cm. A total of 12.0 ml of anesthesia was administered for all surgical sites. Estimated blood loss was less than 10 ml for all surgical sites. A sterile pressure dressing was applied and wound care instructions, with a written handout, were given. The patient was discharged from the Dermatologic Surgery Center alert and ambulatory.    Follow up for suture removal in 2 weeks.            Pictures placed in chart for future reference.      Staff Involved:    Scribe Disclosure  I, Maria R Avila, am serving as a scribe to document services personally performed by Dr. Praveen Young, based on data collection and the provider's statements to me.       Attending attestation:  I was present for key elements of the procedure and immediately available for all other portions of the procedure.  I have reviewed the note and edited it as necessary.    Praveen Young M.D.  Professor  Director of Dermatologic Surgery  Department of Dermatology  [facility]    Dermatology Surgery Clinic  [facility] and  [address] [address]

## 2020-03-02 NOTE — NURSING NOTE
Chief Complaint   Patient presents with     Derm Problem     Donn is here today for excision on his left upper back due to malignant melanoma      Rupinder Cottrell LPN

## 2020-03-02 NOTE — LETTER
3/2/2020       RE: Donn Baptiste  [address]  [facility] [address]     Dear Colleague,    Thank you for referring your patient, Donn Baptiste, to the [facility] DERMATOLOGIC SURGERY at [facility]. Please see a copy of my visit note below.    DERMATOLOGIC EXCISION SURGERY PROCEDURE NOTE     Dermatology Surgery Clinic  [facility]  Clinics and Surgery Center  [address] [place] [address]    Dermatology Problem List:  1. Malignant melanoma, Breslow depth 0.4mm, L upper back; s/p Bx 1/30/2020; s/p excision 3/2/20    Date: March 2, 2020     NAME OF PROCEDURE: Excision with complex linear closure  Surgeon: Praveen Young MD  Fellow: Mino Armas MD  Resident: Holden Ordonez MD  PRE-OPERATIVE DIAGNOSIS:  Melanoma  POST-OPERATIVE DIAGNOSIS: Same   FINAL EXCISION SIZE(EXCISION DEFECT SIZE): 2.7 cm, with 1.0 cm margin   FINAL REPAIR LENGTH: 8.2 cm   INDICATIONS: This patient presented with a 0.7 cm melanoma of the L upper back. Excision was indicated.   We discussed the principles of treatment and most likely complications including bleeding, infection, scarring, alteration in skin color and sensation, wound dehiscence,muscle weakness in the area, or recurrence of the lesion or disease. We reviewed that on occasion, after healing, a secondary procedure or revision may be recommended in order to obtain the best cosmetic or functional result.     PROCEDURE: The patient was taken to the operative suite. Time-out was performed. The treatment area was anesthetized with 1% lidocaine and epinephrine (1:100,000). The area was washed with Hibiclens, rinsed with saline and draped with sterile towels. The lesion was delineated and excised down to deep subcutaneous fat in an elliptical manner. Hemostasis was obtained by electrocoagulation.     REPAIR: A complex layered linear closure was selected as the procedure which would maximally preserve both  function and cosmesis and for the following reasons: Extensive undermining (equal to or greater than the maximum perpendicular width of the defect), exposure of underlying structure (bone, cartilage, tendon, named neurovascular), free margin involvement (helical rim, vermillion border, nostril rim), and/or placement of retention sutures.     The patient was taken to the operative suite and placed supine on the operating room table. The defect was identified. Appropriate markings were made with a marking pen to plan the repair. The area was infiltrated with Lidocaine 1% with epi 1:100,000 and prepped with Hibiclens and draped with sterile towels.     The wound was debeveled and undermined widely. Cones were excised within relaxed skin tension lines on both sides of the defect. Hemostasis was obtained using electrocoagulation. Subcutaneous tissues were then approximated using 4-0 Monocryl buried vertical mattress sutures. The wound edges were then approximated additional 4-0 Monocryl buried sutures were placed in a similar fashion where needed. Percutaneous simple running 4-0 Prolene sutures were carefully placed for maximum eversion and meticulous approximation.    The final wound length was 8.2 cm. A total of 12.0 ml of anesthesia was administered for all surgical sites. Estimated blood loss was less than 10 ml for all surgical sites. A sterile pressure dressing was applied and wound care instructions, with a written handout, were given. The patient was discharged from the Dermatologic Surgery Center alert and ambulatory.    Follow up for suture removal in 2 weeks.            Pictures placed in chart for future reference.      Staff Involved:    Scribe Disclosure  I, Maria R Avila, am serving as a scribe to document services personally performed by Dr. Praveen Young, based on data collection and the provider's statements to me.       Attending attestation:  I was present for key elements of the procedure and immediately  available for all other portions of the procedure.  I have reviewed the note and edited it as necessary.    Praveen Young M.D.  Professor  Director of Dermatologic Surgery  Department of Dermatology  [facility]    Dermatology Surgery Clinic  [place] [id number]

## 2020-03-03 ENCOUNTER — TELEPHONE (OUTPATIENT)
Dept: DERMATOLOGY | Facility: CLINIC | Age: 55
End: 2020-03-03

## 2020-03-03 NOTE — TELEPHONE ENCOUNTER
Follow up call completed following procedure with Dr. flores.    The following questions were asked:    What are you doing to manage your pain? tylenol  Is it helping? yes  Are you applying ice?  no  Have you had any noticeable bleeding through the bandage? no   Do you have any concerns? No          Please call [phone] option 3 if you have any additional questions.

## 2020-03-09 LAB — COPATH REPORT: NORMAL

## 2020-03-16 ENCOUNTER — ALLIED HEALTH/NURSE VISIT (OUTPATIENT)
Dept: DERMATOLOGY | Facility: CLINIC | Age: 55
End: 2020-03-16
Payer: COMMERCIAL

## 2020-03-16 DIAGNOSIS — C43.59 MALIGNANT MELANOMA OF UPPER BACK (H): Primary | ICD-10-CM

## 2021-01-04 DIAGNOSIS — F33.0 MAJOR DEPRESSIVE DISORDER, RECURRENT EPISODE, MILD (H): ICD-10-CM

## 2021-01-04 NOTE — TELEPHONE ENCOUNTER

## 2021-01-15 ENCOUNTER — HEALTH MAINTENANCE LETTER (OUTPATIENT)
Age: 56
End: 2021-01-15

## 2021-01-23 ENCOUNTER — HEALTH MAINTENANCE LETTER (OUTPATIENT)
Age: 56
End: 2021-01-23

## 2021-07-29 NOTE — PATIENT INSTRUCTIONS
EAR PROCEDURE INSTRUCTIONS    BEFORE SURGERY:    -NO IBUPROFEN , MOTRIN, ALEVE,GARLIC SUPPLEMENTS, ASPIRIN PRODUCTS  OR FISH OIL FOR 7 DAYS PRIOR TO SURGERY. Tylenol is fine, generally.( Will need specific instructions from primary care if on blood thinners).    -Read pre-operative pamphlets/ booklets, call or contact us with questions.    - Pre-op History and Physical to be done by primary care physician within 30 days of surgery date. This form is in the packet.    - Restrictions on food and fluid the day of surgery as per packet. Generally, nothing solid to eat 8 hours prior to the procedure. Okay to have clear liquids up to 2 hours before (this includes; water, apple juice, black coffee without any cream or sugar).    - Pre-op scrub, neck down, directions are in your packet. Use the night before surgery and the day of surgery.    - You will need a  the day of surgery.      AFTER SURGERY:    - You can remove your head dressing in 48 hours after surgery  * Leave the steri strips (bandaids) in place- they will fall off on their   own in 10-12 days- you may trim the edges as they loosen.    -  Avoid activities that create pressure. We recommend sneezing with your mouth open, blowing your nose gently. NO SNEEZING WITH MOUTH CLOSED, NO NOSEBLOWING OR STRAINING, NO EAR POPPING<NO LIFTING OVER 15 POUNDS POSTOP x 3 WEEKS.  AVOID ACTIVITIES WHICH CAUSE YOU TO BEAR DOWN OR STRAIN, OR INCREASE HEAD PRESSURE UNTIL FIRST POSTOP VISIT  ( POSSIBLY LONGER)     - No air travel for approximately 6-8 weeks after surgery. This can be discussed at your follow up visit after surgery.    - Follow dry ear precautions. Prior to showering or bathing, gently place a cotton ball into the ear canal, cover external portion with vaseline to form seal and repel water (dry side in, wet side out). Remove cotton ball after.    - You will follow up after surgery in approximately 3 week(s).    - Call [phone] for scheduling or general  questions     For urgent needs after hours call [phone]. You will speak with the hospital  and should ask to have the ENT resident on call paged.    - Please contact our clinic if you note any of the following:          -ear drainage soaking 3 cotton balls in one hour for 3 consecutive hours          -persistent pain after pain medication          -Fever>100 degrees x 24 hours or longer          -Significant dizziness, especially of new onset          -Any questions or concerns about your care    JANNA Jimenez   Direct [phone]  Fax [phone]    Jaelyn Painter [phone]     Expiration Date (Month Year): 2/28/2022

## 2021-09-04 ENCOUNTER — HEALTH MAINTENANCE LETTER (OUTPATIENT)
Age: 56
End: 2021-09-04

## 2021-10-18 DIAGNOSIS — F33.0 MAJOR DEPRESSIVE DISORDER, RECURRENT EPISODE, MILD (H): ICD-10-CM

## 2021-10-18 NOTE — TELEPHONE ENCOUNTER
"Request for medication refill:  sertraline (ZOLOFT) 50 MG tablet    Providers if patient needs an appointment and you are willing to give a one month supply please refill for one month and  send a letter/MyChart using \".SMILLIMITEDREFILL\" .smillimited and route chart to \"P [facility] \" (Giving one month refill in non controlled medications is strongly recommended before denial)    If refill has been denied, meaning absolutely no refills without visit, please complete the smart phrase \".smirxrefuse\" and route it to the \"P [facility] MED REFILLS\"  pool to inform the patient and the pharmacy.    Orquidea Gay        "

## 2022-02-19 ENCOUNTER — HEALTH MAINTENANCE LETTER (OUTPATIENT)
Age: 57
End: 2022-02-19

## 2022-05-23 ENCOUNTER — TELEPHONE (OUTPATIENT)
Dept: AUDIOLOGY | Facility: CLINIC | Age: 57
End: 2022-05-23
Payer: COMMERCIAL

## 2022-05-23 NOTE — TELEPHONE ENCOUNTER
Left VM for patient. Device is no longer under warranty and so prior authorization will be needed to obtain a new device. A message was sent to schedulers to get patient set up with [facility] to get the process started. Let patient know that he will end up working directly with the company for obtaining a new device.      Isra Maloney  Audiologist  [place] License  #[id number]

## 2022-06-21 DIAGNOSIS — Z01.10 ENCOUNTER FOR HEARING TEST: Primary | ICD-10-CM

## 2022-06-23 ENCOUNTER — OFFICE VISIT (OUTPATIENT)
Dept: OTOLARYNGOLOGY | Facility: CLINIC | Age: 57
End: 2022-06-23
Payer: COMMERCIAL

## 2022-06-23 ENCOUNTER — OFFICE VISIT (OUTPATIENT)
Dept: AUDIOLOGY | Facility: CLINIC | Age: 57
End: 2022-06-23
Payer: COMMERCIAL

## 2022-06-23 DIAGNOSIS — H90.3 SNHL (SENSORY-NEURAL HEARING LOSS), ASYMMETRICAL: Primary | ICD-10-CM

## 2022-06-23 DIAGNOSIS — H90.3 SENSORY HEARING LOSS, BILATERAL: Primary | ICD-10-CM

## 2022-06-23 DIAGNOSIS — H61.23 BILATERAL IMPACTED CERUMEN: Primary | ICD-10-CM

## 2022-06-23 PROCEDURE — 92590 PR HEARING AID EXAM MONAURAL: CPT | Mod: RT | Performed by: AUDIOLOGIST

## 2022-06-23 PROCEDURE — 69210 REMOVE IMPACTED EAR WAX UNI: CPT | Mod: 50 | Performed by: REGISTERED NURSE

## 2022-06-23 PROCEDURE — 92550 TYMPANOMETRY & REFLEX THRESH: CPT | Performed by: AUDIOLOGIST

## 2022-06-23 PROCEDURE — 92557 COMPREHENSIVE HEARING TEST: CPT | Performed by: AUDIOLOGIST

## 2022-06-23 ASSESSMENT — PAIN SCALES - GENERAL: PAINLEVEL: NO PAIN (0)

## 2022-06-23 NOTE — PROGRESS NOTES
AUDIOLOGY REPORT    SUMMARY: Audiology visit completed. See audiogram for results.      RECOMMENDATIONS: Follow-up with ENT.      Lee Hurtado, CCC-A  Licensed Audiologist  [place] #[id number]

## 2022-06-23 NOTE — PROGRESS NOTES
AUDIOLOGY REPORT    SUBJECTIVE: Donn Baptiste is a 56 year old male who was seen in the Audiology Clinic at the [facility] [place], on June 23, 2022 for an osseointegrated hearing implant consultation. The patient has been seen in this clinic for a hearing evaluation directly proceeding today's appointment and results indicated normal hearing with a moderate sensorineural hearing loss notch at 4 kHz only with 100% word recognition score in the left ear and a borderline normal sloping to profound/unmeasureable sensorineural hearing loss with 0% word recognition in the right ear. The patient has a history of vestibular schwannoma treated with targeted radiation. Donn was seen previously in this clinic and was fit with a right Cochlear BAHA 5 standard bone anchored processor to be used with an osseointegrated implant on 7/29/19. He was surgically implanted with the abutment by Dr. Nat Camarena on 4/26/19.  The patient reports he lost his BAHA device about 6 months ago. It is no longer under warranty. Donn was not accompanied today by anyone.     OBJECTIVE: Time was spent reviewing patient's recent hearing test. A consultation was conducted in which Donn was presented with osseointegrated device options from [employer]. Time was also spent briefly discussing a CROS/BiCROS hearing aid option. Discussed the difference between traditional hearing aids, CROS (Contralateral Routing Of Signals)/BiCROS system and BAHA.  Warranty information, life expectancy, and billing of the Cochlear BAHA, and the hearing aid CROS system were discussed. We discussed that the company is no longer able to replace his previous BAHA 5 device. Instead, we can attempt to get the current device available, a BAHA 6. Discussed that patient will work directly with the company/[employer] insurance team to determine coverage/approval. Discussed that it is possible that insurance may not cover device given  it fit less than 5 years ago. Patient expressed understanding. It was decided that patient will attempt to get a replacement BAHA device first. If this is denied by insurance, patient may consider purchasing device out of pocket or consider purchasing a CROS system.     Donn has chosen to move forward with a replacement device first. The system mutually chosen was:     Right: Cochlear BAHA 6 Max   COLOR: Copper   BATTERY SIZE: 312   EARMOLD/TIPS: not applicable    ACCESSORY CHOSEN: Remote Microphone    Copies of patient's insurance card were made. Reimbursement/replacement paperwork was completed by patient to be sent to [employer].     ASSESSMENT: An osseointegrated hearing implant consultation was conducted today as Donn has lost his previously fit device. The patient has decided to pursue a Cochlear BAHA 6 Max at this time.    PLAN: Donn has decided to purse a replacement BAHA device. Completed paperwork will be sent to [employer] as patient will work directly with company to obtain device. If replacement device is obtained, patient will return to Audiology for fitting of the processor. Please contact this clinic with any questions or concerns.      Abimael Zuleta. CCC-A  Licensed Audiologist   [place] #[id number]

## 2022-06-23 NOTE — LETTER
6/23/2022       RE: Donn Baptiste  [address]  [facility] [address]     Dear Colleague,    Thank you for referring your patient, Donn Baptiste, to the [facility] E[facility]SE [facility] [place] [facility]. Please see a copy of my visit note below.      Otolaryngology Clinic  June 23, 2022    HPI:  Donn Baptiste is here for cerumen impaction removal as requested by the audiology team prior to audiogram.     Patient denies any otalgia, otorrhea, or dizziness.      Otologic microscope exam:    Biocular Microscopy exam is needed due to deep impaction of cerumen of bilateral ears, requiring direct visualization for use of cleaning instruments.    Right ear was examined under the microscope.  Complete cerumen impaction noted. It was cleaned with suction. Once cleaned, TM visualized under microscope. Small amount of bleeding on anterior from previous cleaning attempt with lighted curette. Normal appearing TM, nicely aerated middle ear space.     Left ear was also examined under the microscope.  Complete cerumen impaction noted. It was cleaned with suction. Once cleaned, TM visualized under microscope. Normal appearing TM, nicely aerated middle ear space.     The patient noted improvement of symptoms.    Assessment and Plan:  1. Bilateral impacted cerumen  Patient presents with cerumen impaction of bilateral ears.  The patient's ears are cleaned today.  May proceed with audiology appointments as scheduled. Return as needed for cleaning.     Traci Baldwin DNP, APRN, CNP  Otolaryngology  Head & Neck Surgery  [phone]    15 minutes spent on the date of the encounter doing chart review, history and exam, documentation and further activities per the note excluding time performing ear cleaning under microscopy.        Again, thank you for allowing me to participate in the care of your patient.      Sincerely,    Mayelin Baldwin,  NP

## 2022-06-23 NOTE — NURSING NOTE
Chief Complaint   Patient presents with     RECHECK     Add on -needs ear cleaning before audio        Leo Ching LPN

## 2022-06-23 NOTE — PROGRESS NOTES
Otolaryngology Clinic  June 23, 2022    HPI:  Donn Baptiste is here for cerumen impaction removal as requested by the audiology team prior to audiogram.     Patient denies any otalgia, otorrhea, or dizziness.      Otologic microscope exam:    Biocular Microscopy exam is needed due to deep impaction of cerumen of bilateral ears, requiring direct visualization for use of cleaning instruments.    Right ear was examined under the microscope.  Complete cerumen impaction noted. It was cleaned with suction. Once cleaned, TM visualized under microscope. Small amount of bleeding on anterior from previous cleaning attempt with lighted curette. Normal appearing TM, nicely aerated middle ear space.     Left ear was also examined under the microscope.  Complete cerumen impaction noted. It was cleaned with suction. Once cleaned, TM visualized under microscope. Normal appearing TM, nicely aerated middle ear space.     The patient noted improvement of symptoms.    Assessment and Plan:  1. Bilateral impacted cerumen  Patient presents with cerumen impaction of bilateral ears.  The patient's ears are cleaned today.  May proceed with audiology appointments as scheduled. Return as needed for cleaning.     Traci Baldwin DNP, APRN, CNP  Otolaryngology  Head & Neck Surgery  [phone]    15 minutes spent on the date of the encounter doing chart review, history and exam, documentation and further activities per the note excluding time performing ear cleaning under microscopy.

## 2022-06-29 ENCOUNTER — DOCUMENTATION ONLY (OUTPATIENT)
Dept: AUDIOLOGY | Facility: CLINIC | Age: 57
End: 2022-06-29

## 2022-06-29 NOTE — PROGRESS NOTES
Signed upgrade paperwork sent to [employer].     Abimael Zuleta. CCC-A  Licensed Audiologist   [place] #[id number]

## 2022-09-08 ENCOUNTER — OFFICE VISIT (OUTPATIENT)
Dept: FAMILY MEDICINE | Facility: CLINIC | Age: 57
End: 2022-09-08
Payer: COMMERCIAL

## 2022-09-08 VITALS
DIASTOLIC BLOOD PRESSURE: 77 MMHG | OXYGEN SATURATION: 97 % | TEMPERATURE: 98.3 F | SYSTOLIC BLOOD PRESSURE: 115 MMHG | HEART RATE: 77 BPM | WEIGHT: 158.2 LBS | HEIGHT: 67 IN | BODY MASS INDEX: 24.83 KG/M2

## 2022-09-08 DIAGNOSIS — K57.30 DIVERTICULOSIS OF LARGE INTESTINE WITHOUT HEMORRHAGE: ICD-10-CM

## 2022-09-08 DIAGNOSIS — Z85.820 HISTORY OF MALIGNANT MELANOMA OF BACK: ICD-10-CM

## 2022-09-08 DIAGNOSIS — Z00.00 ROUTINE GENERAL MEDICAL EXAMINATION AT A HEALTH CARE FACILITY: ICD-10-CM

## 2022-09-08 DIAGNOSIS — Z13.220 SCREENING FOR LIPID DISORDERS: ICD-10-CM

## 2022-09-08 DIAGNOSIS — Z12.11 SCREENING FOR COLON CANCER: ICD-10-CM

## 2022-09-08 DIAGNOSIS — F33.0 MAJOR DEPRESSIVE DISORDER, RECURRENT EPISODE, MILD (H): ICD-10-CM

## 2022-09-08 PROCEDURE — 99396 PREV VISIT EST AGE 40-64: CPT | Performed by: FAMILY MEDICINE

## 2022-09-08 ASSESSMENT — ENCOUNTER SYMPTOMS
HEARTBURN: 0
WEAKNESS: 0
HEMATOCHEZIA: 0
FEVER: 0
PALPITATIONS: 0
DIZZINESS: 0
NERVOUS/ANXIOUS: 0
NAUSEA: 0
JOINT SWELLING: 0
ABDOMINAL PAIN: 0
HEADACHES: 0
PARESTHESIAS: 0
CONSTIPATION: 0
ARTHRALGIAS: 0
FREQUENCY: 0
DIARRHEA: 0
MYALGIAS: 0
EYE PAIN: 0
COUGH: 0
CHILLS: 0
SHORTNESS OF BREATH: 0
DYSURIA: 0
HEMATURIA: 0
SORE THROAT: 0

## 2022-09-08 NOTE — PROGRESS NOTES
SUBJECTIVE:   CC: Donn Baptiste is an 56 year old male who presents for preventative health visit.     Healthy Habits:     Getting at least 3 servings of Calcium per day:  Yes    Bi-annual eye exam:  Yes    Dental care twice a year:  Yes    Sleep apnea or symptoms of sleep apnea:  None    Diet:  Regular (no restrictions)    Frequency of exercise:  2-3 days/week    Duration of exercise:  15-30 minutes    Taking medications regularly:  Yes    PHQ-2 Total Score: 2    Additional concerns today:  No    Today's PHQ-2 Score:   PHQ-2 (  [employer]) 2022   Q1: Little interest or pleasure in doing things 1   Q2: Feeling down, depressed or hopeless 1   PHQ-2 Score 2   PHQ-2 Total Score (12-17 Years)- Positive if 3 or more points; Administer PHQ-A if positive -   Q1: Little interest or pleasure in doing things Several days   Q2: Feeling down, depressed or hopeless Several days   PHQ-2 Score 2     Abuse: Current or Past(Physical, Sexual or Emotional)- Not documented  Do you feel safe in your environment? Yes    Have you ever done Advance Care Planning? (For example, a Health Directive, POLST, or a discussion with a medical provider or your loved ones about your wishes): No, advance care planning information given to patient to review.  Patient plans to discuss their wishes with loved ones or provider.      Social History     Tobacco Use     Smoking status: Former Smoker     Types: Cigarettes     Quit date: 2005     Years since quittin.3     Smokeless tobacco: Never Used   Substance Use Topics     Alcohol use: No     Alcohol/week: 0.0 standard drinks     Alcohol Use 2022   Prescreen: >3 drinks/day or >7 drinks/week? No     Last PSA: No results found for: PSA    Reviewed orders with patient. Reviewed health maintenance and updated orders accordingly - Yes    Reviewed and updated as needed this visit by clinical staff   Tobacco  Allergies  Meds   Med Hx  Surg Hx  Fam Hx  Soc Hx       The 10-year ASCVD  risk score (Ceci WEN Jr., et al., 2013) is: 6.5%    Values used to calculate the score:      Age: 56 years      Sex: Male      Is Non- : No      Diabetic: No      Tobacco smoker: No      Systolic Blood Pressure: 115 mmHg      Is BP treated: No      HDL Cholesterol: 48.9 mg/dL      Total Cholesterol: 208.7 mg/dL    Reviewed and updated as needed this visit by Provider                    Review of Systems   Constitutional: Negative for chills and fever.   HENT: Negative for congestion, ear pain, hearing loss and sore throat.    Eyes: Negative for pain and visual disturbance.   Respiratory: Negative for cough and shortness of breath.    Cardiovascular: Negative for chest pain, palpitations and peripheral edema.   Gastrointestinal: Negative for abdominal pain, constipation, diarrhea, heartburn, hematochezia and nausea.   Genitourinary: Negative for dysuria, frequency, genital sores, hematuria, impotence, penile discharge and urgency.   Musculoskeletal: Negative for arthralgias, joint swelling and myalgias.   Skin: Negative for rash.   Neurological: Negative for dizziness, weakness, headaches and paresthesias.   Psychiatric/Behavioral: Negative for mood changes. The patient is not nervous/anxious.      CONSTITUTIONAL: NEGATIVE for fever, chills, change in weight  INTEGUMENTARY/SKIN: NEGATIVE for worrisome rashes, moles or lesions  EYES: NEGATIVE for vision changes or irritation  ENT: NEGATIVE for ear, mouth and throat problems  RESP: NEGATIVE for significant cough or SOB  CV: NEGATIVE for chest pain, palpitations or peripheral edema  GI: NEGATIVE for nausea, abdominal pain, heartburn, or change in bowel habits   male: negative for dysuria, hematuria, decreased urinary stream, erectile dysfunction, urethral discharge  MUSCULOSKELETAL: NEGATIVE for significant arthralgias or myalgia  NEURO: NEGATIVE for weakness, dizziness or paresthesias  PSYCHIATRIC: NEGATIVE for changes in mood or  "affect    OBJECTIVE:   /77   Pulse 77   Temp 98.3  F (36.8  C) (Oral)   Ht 1.712 m (5' 7.4\")   Wt 71.8 kg (158 lb 3.2 oz)   SpO2 97%   BMI 24.48 kg/m      Physical Exam  GENERAL: healthy, alert and no distress  EYES: Eyes grossly normal to inspection, PERRL and conjunctivae and sclerae normal  HENT: ear canals and TM's normal, nose and mouth without ulcers or lesions. Has unilateral cochlear implant, not wearing device  NECK: no adenopathy, no asymmetry, masses, or scars and thyroid normal to palpation  RESP: lungs clear to auscultation - no rales, rhonchi or wheezes  CV: regular rate and rhythm, normal S1 S2, no S3 or S4, no murmur, click or rub, no peripheral edema and peripheral pulses strong  ABDOMEN: soft, nontender, no hepatosplenomegaly, no masses and bowel sounds normal  MS: no gross musculoskeletal defects noted, no edema  SKIN: freckled skin and h/o excised melanoma of L upper Ca on back. Mult nevi at various stages present  NEURO: Normal strength and tone, mentation intact and speech normal  BACK: no CVA tenderness, no paralumbar tenderness  PSYCH: mentation appears normal, affect normal/bright  LYMPH: no cervical, supraclavicular, axillary, or inguinal adenopathy      ASSESSMENT/PLAN:   Donn was seen today for physical.  Diagnoses and all orders for this visit:    Routine general medical examination at a health care facility    Screening for colon cancer  Diverticulosis of large intestine without hemorrhage  -     Colonoscopy Screening  Referral; Future    Screening for lipid disorders  -     Lipid panel reflex to direct LDL Fasting; Future    Major depressive disorder, recurrent episode, mild (H)  -     Stable. Refill sertraline (ZOLOFT) 50 MG tablet; Take 1 tablet (50 mg) by mouth daily    History of malignant melanoma of back        -     Needs annual complete skin exams with Derm, overdue. Will let me know if referral needed.     COUNSELING:   Reviewed preventive health " "counseling, as reflected in patient instructions    Estimated body mass index is 24.48 kg/m  as calculated from the following:    Height as of this encounter: 1.712 m (5' 7.4\").    Weight as of this encounter: 71.8 kg (158 lb 3.2 oz).     He reports that he quit smoking about 17 years ago. His smoking use included cigarettes. He has never used smokeless tobacco.    Counseling Resources:  ATP IV Guidelines  Pooled Cohorts Equation Calculator  FRAX Risk Assessment  ICSI Preventive Guidelines  Dietary Guidelines for Americans, 2010  USDA's MyPlate  ASA Prophylaxis  Lung CA Screening    Sheila Mantilla MD  [facility]  "

## 2022-09-12 ENCOUNTER — TELEPHONE (OUTPATIENT)
Dept: GASTROENTEROLOGY | Facility: CLINIC | Age: 57
End: 2022-09-12

## 2022-09-12 NOTE — TELEPHONE ENCOUNTER
Screening Questions  BlueKIND OF PREP RedLOCATION [review exclusion criteria] GreenSEDATION TYPE      1.  y Are you active on mychart?       2.  Sheila Mantilla MD Ordering/Referring Provider:      3. bcbs  What insurance is in the chart?    4.  y Do you have a legal guardian or medical Power of ?              Are you able to give consent for your medical care?                (Sedation review/consideration needed)      5.   24.48  BMI  [BMI OVER 40-EXTENDED PREP]  If greater than 40 review exclusion criteria [PAC APPT IF @ [facility]]       6.   n Have you had a positive covid test in the last 90 days?      7.   Respiratory Screening :  [If yes to any of the following HOSPITAL setting only]                     n Do you use daily home oxygen?   n Do you have mod to severe Obstructive Sleep Apnea?  [OKAY @ [facility] SH PH RI]                  n Do you have Pulmonary Hypertension?                   n Do you have UNCONTROLLED asthma?         8.   n Have you had a heart or lung transplant?       9.   n Are you currently on dialysis?   [ If yes, G-PREP & HOSPITAL setting only]      10.   n  Do you have chronic kidney disease?  [ If yes, G-PREP ]     11.  n Have you had a stroke or Transient ischemic attack (TIA - aka  mini stroke ) within 6 months?   (If yes, please review exclusion criteria)     12.   n In the past 6 months, have you had any heart related issues including cardiomyopathy or heart attack?           n If yes, did it require cardiac stenting or other implantable device?       13.   n Do you have any implantable devices in your body (pacemaker, defib, LVAD)?  (If yes, please review exclusion criteria)     14.   n Do you take nitroglycerin?            n If yes, how often? n  (if yes, HOSPITAL setting ONLY)     15.   n Are you currently taking any blood thinners?           [IF YES, INFORM PATIENT TO FOLLOW UP W/ ORDERING PROVIDER FOR BRIDGING INSTRUCTIONS]      16.    n  Do you have a diagnosis of  diabetes?  [ If yes, G-PREP ]     17.   [FEMALES] n Are you currently pregnant?    n If yes, how many weeks?      18.    n  Are you taking any prescription pain medications on a routine schedule?    [ If yes, EXTENDED PREP.] [If yes, MAC]    22.  Do you take the medication Phentermine?     Yes-> Hold for 7 days before procedure.  Please consult your prescribing provider if you have questions about holding this medication.     No-> Continue to next question.     19.   n Do you have any chemical dependencies such as alcohol, street drugs, or methadone?   [If yes, MAC]     20.   n Do you have any history of post-traumatic stress syndrome, severe anxiety or history of psychosis?   [If yes, MAC]     21.   y Do you transfer independently?       22.   n  On a regular basis do you go 3-5 days between bowel movements?  [ If yes, EXTENDED PREP.]     23.    Preferred LOCAL Pharmacy for Pre Prescription     [id number] - [place], [place] - [address]          Scheduling Details         Christopher : Caller   (Please ask for phone number if not scheduled by patient)    Lower Endoscopy [Colonoscopy] : Type of Procedure Scheduled   gprep Which Colonoscopy Prep was Sent?      shskylart Surgeon    11/28 Date of Procedure   csc Location    mod Sedation Type     Conscious Sedation- Needs  for 6 hours after the procedure  MAC/General-Needs  for 24 hours after procedure     n Pre-op Required at [facility] [place], [place] and OR for MAC sedation   (advise patient they will need a pre-op prior to procedure -)       y Informed patient they will need an adult    Cannot take any type of public or medical transportation alone     home Pre-Procedure Covid test to be completed at [facility] or Externally  [[facility] PCR Testing Required]     y  Confirmed Nurse will call to complete assessment     Additional comments:

## 2022-11-08 ENCOUNTER — TELEPHONE (OUTPATIENT)
Dept: FAMILY MEDICINE | Facility: CLINIC | Age: 57
End: 2022-11-08

## 2022-11-08 ENCOUNTER — IMMUNIZATION (OUTPATIENT)
Dept: FAMILY MEDICINE | Facility: CLINIC | Age: 57
End: 2022-11-08
Payer: COMMERCIAL

## 2022-11-08 DIAGNOSIS — Z23 ENCOUNTER FOR IMMUNIZATION: Primary | ICD-10-CM

## 2022-11-08 PROCEDURE — 90471 IMMUNIZATION ADMIN: CPT

## 2022-11-08 PROCEDURE — 90746 HEPB VACCINE 3 DOSE ADULT IM: CPT

## 2022-11-08 PROCEDURE — 91313 COVID-19,PF,MODERNA BIVALENT: CPT

## 2022-11-08 PROCEDURE — 99207 PR NO CHARGE NURSE ONLY: CPT

## 2022-11-08 PROCEDURE — 0134A COVID-19,PF,MODERNA BIVALENT: CPT

## 2022-11-08 NOTE — TELEPHONE ENCOUNTER
Patient came to clinic to get updated immunizations for his job. He needs to know if he has immunity to the varicella virus and is asking for a titer to be drawn.    I let him know I would forward request to his provider.    Camille Palmer RN

## 2022-11-08 NOTE — PROGRESS NOTES
Patient needs updated immunizations for his job, today he was given    Moderna Bivalent in the left deltoid  Hep B in the left deltoid    Camille Palmer RN

## 2022-11-09 ENCOUNTER — TELEPHONE (OUTPATIENT)
Dept: FAMILY MEDICINE | Facility: CLINIC | Age: 57
End: 2022-11-09

## 2022-11-09 DIAGNOSIS — Z00.00 ENCOUNTER FOR PREVENTIVE CARE: Primary | ICD-10-CM

## 2022-11-09 NOTE — TELEPHONE ENCOUNTER
Called patient to let him know that lab has been placed for varicella titer.    Camille Palmer RN

## 2022-11-15 ENCOUNTER — LAB (OUTPATIENT)
Dept: LAB | Facility: CLINIC | Age: 57
End: 2022-11-15
Payer: COMMERCIAL

## 2022-11-15 DIAGNOSIS — Z00.00 ENCOUNTER FOR PREVENTIVE CARE: ICD-10-CM

## 2022-11-15 DIAGNOSIS — Z13.220 SCREENING FOR LIPID DISORDERS: ICD-10-CM

## 2022-11-15 PROCEDURE — 86787 VARICELLA-ZOSTER ANTIBODY: CPT

## 2022-11-15 PROCEDURE — 36415 COLL VENOUS BLD VENIPUNCTURE: CPT

## 2022-11-16 LAB
VZV IGG SER QL IA: 3593 INDEX
VZV IGG SER QL IA: POSITIVE

## 2022-11-17 ENCOUNTER — TELEPHONE (OUTPATIENT)
Dept: SURGERY | Facility: AMBULATORY SURGERY CENTER | Age: 57
End: 2022-11-17

## 2022-11-17 DIAGNOSIS — Z12.11 ENCOUNTER FOR SCREENING COLONOSCOPY: Primary | ICD-10-CM

## 2022-11-17 RX ORDER — BISACODYL 5 MG
TABLET, DELAYED RELEASE (ENTERIC COATED) ORAL
Qty: 4 TABLET | Refills: 0 | Status: SHIPPED | OUTPATIENT
Start: 2022-11-17

## 2022-11-17 NOTE — TELEPHONE ENCOUNTER
Attempted to contact patient regarding upcoming Colonoscopy procedure on 11.28.2022 for pre assessment questions. No answer.     Left message to return call to [phone] #4     Discuss at home rapid antigen COVID test 1-2 days prior to procedure.    Arrival time: 0800    Facility location: [facility]    Sedation type: CS    Indication for procedure: Screening    Anticoagulants: No.     Bowel prep recommendation: Golytely d/t mag citrate recall    Prep instructions sent via [facility]  Prep prescription sent to    [id number] - [place], [place] - [address]    Diabetic? No     Aletha Harris RN

## 2022-11-21 NOTE — TELEPHONE ENCOUNTER
Pre assessment questions completed for upcoming colonoscopy procedure scheduled on 11.28.22    COVID policy reviewed. Patient to complete rapid antigen test one to two days before their scheduled procedure. Patient to bring photo of the results when they come in for their procedure.    Reviewed procedural arrival time 0800 and facility location [facility].    Designated  policy reviewed. Instructed to have someone stay 6 hours post procedure.     Anticoagulation/blood thinners? No    Electronic implanted devices? No    Diabetic? No    Reviewed standard golytely prep instructions with patient. No fiber/iron supplements or foods that contain nuts/seeds prior to procedure.     Patient verbalized understanding and had no questions or concerns at this time.    Anjelica Benton RN

## 2022-11-28 ENCOUNTER — HOSPITAL ENCOUNTER (OUTPATIENT)
Facility: AMBULATORY SURGERY CENTER | Age: 57
Discharge: HOME | End: 2022-11-28
Attending: INTERNAL MEDICINE | Admitting: INTERNAL MEDICINE
Payer: COMMERCIAL

## 2022-11-28 VITALS
HEART RATE: 57 BPM | HEIGHT: 67 IN | SYSTOLIC BLOOD PRESSURE: 103 MMHG | OXYGEN SATURATION: 97 % | WEIGHT: 160 LBS | DIASTOLIC BLOOD PRESSURE: 61 MMHG | BODY MASS INDEX: 25.11 KG/M2 | TEMPERATURE: 97.4 F | RESPIRATION RATE: 16 BRPM

## 2022-11-28 PROCEDURE — 45385 COLONOSCOPY W/LESION REMOVAL: CPT | Mod: 33

## 2022-11-28 PROCEDURE — 88305 TISSUE EXAM BY PATHOLOGIST: CPT | Mod: TC | Performed by: INTERNAL MEDICINE

## 2022-11-28 PROCEDURE — 88305 TISSUE EXAM BY PATHOLOGIST: CPT | Mod: 26 | Performed by: STUDENT IN AN ORGANIZED HEALTH CARE EDUCATION/TRAINING PROGRAM

## 2022-11-28 RX ORDER — FLUMAZENIL 0.1 MG/ML
0.2 INJECTION, SOLUTION INTRAVENOUS
Status: CANCELLED | OUTPATIENT
Start: 2022-11-28 | End: 2022-11-28

## 2022-11-28 RX ORDER — NALOXONE HYDROCHLORIDE 0.4 MG/ML
0.4 INJECTION, SOLUTION INTRAMUSCULAR; INTRAVENOUS; SUBCUTANEOUS
Status: CANCELLED | OUTPATIENT
Start: 2022-11-28

## 2022-11-28 RX ORDER — ONDANSETRON 2 MG/ML
4 INJECTION INTRAMUSCULAR; INTRAVENOUS EVERY 6 HOURS PRN
Status: CANCELLED | OUTPATIENT
Start: 2022-11-28

## 2022-11-28 RX ORDER — ONDANSETRON 2 MG/ML
4 INJECTION INTRAMUSCULAR; INTRAVENOUS
Status: DISCONTINUED | OUTPATIENT
Start: 2022-11-28 | End: 2022-11-29 | Stop reason: HOSPADM

## 2022-11-28 RX ORDER — LIDOCAINE 40 MG/G
CREAM TOPICAL
Status: DISCONTINUED | OUTPATIENT
Start: 2022-11-28 | End: 2022-11-29 | Stop reason: HOSPADM

## 2022-11-28 RX ORDER — ONDANSETRON 4 MG/1
4 TABLET, ORALLY DISINTEGRATING ORAL EVERY 6 HOURS PRN
Status: CANCELLED | OUTPATIENT
Start: 2022-11-28

## 2022-11-28 RX ORDER — FENTANYL CITRATE 50 UG/ML
INJECTION, SOLUTION INTRAMUSCULAR; INTRAVENOUS PRN
Status: DISCONTINUED | OUTPATIENT
Start: 2022-11-28 | End: 2022-11-28 | Stop reason: HOSPADM

## 2022-11-28 RX ORDER — NALOXONE HYDROCHLORIDE 0.4 MG/ML
0.2 INJECTION, SOLUTION INTRAMUSCULAR; INTRAVENOUS; SUBCUTANEOUS
Status: CANCELLED | OUTPATIENT
Start: 2022-11-28

## 2022-11-28 RX ORDER — PROCHLORPERAZINE MALEATE 10 MG
10 TABLET ORAL EVERY 6 HOURS PRN
Status: CANCELLED | OUTPATIENT
Start: 2022-11-28

## 2022-11-28 NOTE — H&P
Donn Baptiste  [id number]  male  56 year old      Reason for procedure/surgery: screening     Patient Active Problem List   Diagnosis     Seasonal allergic rhinitis     Major depressive disorder, recurrent episode, mild (H)     Family history of colon cancer     Chemical dependency (H)       Past Surgical History:    Past Surgical History:   Procedure Laterality Date     IMPLANT BAHA Right 2019    Procedure: Ossteointegrated Implant with sound processor with the cochlear device-RIGHT;  Surgeon: Nat Camarena MD;  Location:  OR       Past Medical History:   Past Medical History:   Diagnosis Date     Allergic rhinitis      Conductive hearing loss      Depressive disorder      Hearing problem        Social History:   Social History     Tobacco Use     Smoking status: Former     Types: Cigarettes     Quit date: 2005     Years since quittin.6     Smokeless tobacco: Never   Substance Use Topics     Alcohol use: No     Alcohol/week: 0.0 standard drinks       Family History:   Family History   Problem Relation Age of Onset     Cancer - colorectal Father      Substance Abuse Father      Cancer Father      Diabetes Sister      Mental Illness Sister      Depression Sister      Diabetes Sister      Thyroid Disease Sister      Migraines Sister      Asthma No family hx of      Hypertension No family hx of      C.A.D. No family hx of      Cerebrovascular Disease No family hx of        Allergies:   Allergies   Allergen Reactions     Amoxicillin Rash     Urticarial reaction       Active Medications:   Current Outpatient Medications   Medication Sig Dispense Refill     bisacodyl (DULCOLAX) 5 MG EC tablet Take 2 tablets at 3 pm the day before your procedure. If your procedure is before 11 am, take 2 additional tablets at 11 pm. If your procedure is after 11 am, take 2 additional tablets at 6 am. For additional instructions refer to your colonoscopy prep instructions. 4 tablet 0     cetirizine (ZYRTEC) 10 MG  "tablet Take 1 tablet (10 mg) by mouth daily 90 tablet 3     fluticasone (FLONASE) 50 MCG/ACT spray Spray 1-2 sprays into both nostrils daily 16 g 3     polyethylene glycol (GOLYTELY) 236 g suspension The night before the exam at 6 pm drink an 8-ounce glass every 15 minutes until the jug is half empty. If you arrive before 11 AM: Drink the other half of the Golytely jug at 11 PM night before procedure. If you arrive after 11 AM: Drink the other half of the Golytely jug at 6 AM day of procedure. For additional instructions refer to your colonoscopy prep instructions. 4000 mL 0     sertraline (ZOLOFT) 50 MG tablet Take 1 tablet (50 mg) by mouth daily 90 tablet 3     IBUPROFEN PO          Systemic Review:   CONSTITUTIONAL: NEGATIVE for fever, chills, change in weight  ENT/MOUTH: NEGATIVE for ear, mouth and throat problems  RESP: NEGATIVE for significant cough or SOB  CV: NEGATIVE for chest pain, palpitations or peripheral edema    Physical Examination:   Vital Signs: /61   Pulse 78   Temp 97.8  F (36.6  C) (Temporal)   Resp 14   Ht 1.702 m (5' 7\")   Wt 72.6 kg (160 lb)   SpO2 100%   BMI 25.06 kg/m    GENERAL: healthy, alert and no distress  NECK: no adenopathy, no asymmetry, masses, or scars  RESP: lungs clear to auscultation - no rales, rhonchi or wheezes  CV: regular rate and rhythm, normal S1 S2, no S3 or S4, no murmur, click or rub, no peripheral edema and peripheral pulses strong  ABDOMEN: soft, nontender, no hepatosplenomegaly, no masses and bowel sounds normal  MS: no gross musculoskeletal defects noted, no edema    Plan: Appropriate to proceed as scheduled.      Alexus Cote MD  11/28/2022    PCP:  Sheila Mantilla    "

## 2022-11-29 LAB
PATH REPORT.COMMENTS IMP SPEC: NORMAL
PATH REPORT.COMMENTS IMP SPEC: NORMAL
PATH REPORT.FINAL DX SPEC: NORMAL
PATH REPORT.GROSS SPEC: NORMAL
PATH REPORT.MICROSCOPIC SPEC OTHER STN: NORMAL
PATH REPORT.RELEVANT HX SPEC: NORMAL
PHOTO IMAGE: NORMAL

## 2022-12-08 LAB — COLONOSCOPY: NORMAL

## 2022-12-12 ENCOUNTER — TELEPHONE (OUTPATIENT)
Dept: FAMILY MEDICINE | Facility: CLINIC | Age: 57
End: 2022-12-12

## 2022-12-12 NOTE — TELEPHONE ENCOUNTER
Patient came to clinic for 2nd Hep B injection, it is too early for patient to get the 2nd dose.    Dosing is 0, 1 and 6 month, earliest for the 2nd dose is 12/16/22    Camille Palmer RN

## 2023-02-03 ENCOUNTER — TRANSFERRED RECORDS (OUTPATIENT)
Dept: HEALTH INFORMATION MANAGEMENT | Facility: CLINIC | Age: 58
End: 2023-02-03
Payer: COMMERCIAL

## 2023-03-05 ENCOUNTER — TELEPHONE (OUTPATIENT)
Dept: FAMILY MEDICINE | Facility: CLINIC | Age: 58
End: 2023-03-05
Payer: COMMERCIAL

## 2023-03-05 NOTE — TELEPHONE ENCOUNTER
Called patient at 0859 regarding positive COVID test. Patient stating first time having COVID. Started having symptoms yesterday 3/4. Son stating had sxs since Thursday which would have been 3/2. Symptoms is sore throat, fevers, but has been keeping fever lower with ibuprofen and tylenol. Has had worse sxs.        Son sleeping a lot. Son's PMH - takes lexapro for depression.    Wife has not tested positive yet.     Did discuss that indication for paxlovid is COVID-19, treatment, mild to moderate (outpatients with high risk of progression to severe illness) and that based on co morbidities Kenneth may not be best candidate, but will look into weekend clinic options and call back.      Shira Pierson MD

## 2023-03-05 NOTE — PROGRESS NOTES
Called patient at 0859 regarding positive COVID test. Patient stating first time having COVID. Started having symptoms yesterday 3/4. Son stating had sxs since Thursday which would have been 3/2. Symptoms is sore throat, fevers, but has been keeping fever lower with ibuprofen and tylenol. Has had worse sxs.    Son sleeping a lot. States PMH of son - takes lexapro for depression.    Kenneth stated    Wife has not tested positive yet.    Did discuss that indication for paxlovid is COVID-19, treatment, mild to moderate (outpatients with high risk of progression to severe illness) and that based on co morbidities Kenneth may not be best candidate, but will look into weekend clinic options and call back.     Shira Pierson MD

## 2023-03-05 NOTE — PATIENT INSTRUCTIONS
COVID-19 Outpatient Treatments  Your care team can help you find the best treatments for COVID-19. Talk to a health care provider or refer to the FDA medicine fact sheets below.    Important: You can't have Paxlovid or molnupiravir if you're starting the medicine more than 5 days after your symptoms have started.  Paxlovid: https://www.fda.gov/media/925609/download  Molnupiravir (Lagevrio): https://www.fda.gov/media/697033/download  Paxlovid (nimatrelvir and ritonavir)  How it works  Two medicines (nirmatrelvir and ritonavir) are taken together. They stop the virus from growing. Less amount of virus is easier for your body to fight.  Benefits  Lowers risk of a hospital stay or death from COVID-19.  How to take    Medicine comes in a daily container with both medicine tablets. Take by mouth twice daily (once in the morning, once at night) for 5 days.    The number of tablets to take varies by patient.    Don't chew or break capsules. Swallow whole.  When to take  Take as soon as possible after positive COVID-19 test result, and within 5 days of your first symptoms.  Who can take it  Patients must be 12 years or older, weigh at least 88 pounds, and have tested positive for COVID-19. Paxlovid is the preferred treatment for pregnant patients.  Possible side effects  Can cause altered sense of taste, diarrhea (loose, watery stools), high blood pressure, muscle aches.  Medicine conflicts    Some medicines may conflict with Paxlovid and may cause serious side effects.    Tell your care team about all the medicines you take, including prescription and over-the-counter medicines, vitamins, and herbal supplements.    Your care team will review your medicines to make sure that you can safely take Paxlovid.  Cautions    Paxlovid is not advised for patients with severe kidney or liver disease. If you have kidney or liver problems, the dose may need to be changed.    If you're pregnant or breastfeeding, talk to your care team  about your options.    If you take hormonal birth control (such as the Pill), then you or your partner should also use a non-hormonal form of birth control (such as a condom). Keep doing this for 1 menstrual cycle after your last dose of Paxlovid.  Molnupiravir (Lagevrio)  How it works  Stops the virus from growing. Less amount of virus is easier for your body to fight.  Benefits  Lowers risk of a hospital stay or death from COVID-19.  How to take  Take 4 capsules by mouth every 12 hours (4 in the morning and 4 at night) for 5 days. Don't chew or break capsules. Swallow whole.  When to take  Take as soon as possible after positive COVID-19 test result, and within 5 days of your first symptoms.  Who can take it  Patients must be 18 years or older and have tested positive for COVID-19.  Possible side effects  Diarrhea (loose, watery stools), nausea (feeling sick to your stomach), dizziness, headaches.  Medicine conflicts  Right now, there are no known conflicts with other drugs. But tell your care team about all medicines you take.  Cautions    This medicine is not advised for patients who are pregnant.    If you are someone who could become pregnant, use trusted birth control until 4 days after your last dose of molnupiravir.    If your partner could become pregnant, you should use trusted birth control until 3 months after your last dose of molnupiravir.  For informational purposes only. Not to replace the advice of your health care provider. Copyright   2022 [facility]. All rights reserved. Clinically reviewed by Yajaira Baldwin, PharmD, BCACP. [employer] [id number] - REV 12/22.

## 2023-03-05 NOTE — TELEPHONE ENCOUNTER
Called patient again to confirm that was not able to find weekend clinic option for paxlovid dispensation. However, informed patient that early morning call to clinic for Monday appointment appropriate and will plan for patient to call in early morning to set up appointment for self and son (if concerned). Reaffirmed indicated use of paxlovid for those at high risk of severe COVID. Patient and son both without risk factors for moderate/severe COVID. Patient to call to schedule    Will also route patient chart to  and [facility] triage pool to help get patient scheduled for clinic visit.    Shira Pierson MD

## 2023-08-23 NOTE — TELEPHONE ENCOUNTER
10/29/18 Patient informed that referral is complete and in chart. Liseth Mistry will be able to see referral from .     Iliana Rose  Care Coordinator     Chief Complaint::   Chief Complaint   Patient presents with   • Consultation     Referred by Dr. Aparicio 23  For Prolapse       Dejon Aparicio DO     New Patient    SUBJECTIVE  HPI  Barbara Franz is a 39 year old female who presents for an urogynecology evaluation of urinary incontinence. She has been told she has stage II prolapse in the past.     She states she has had urinary incontinence for the past 4 Year(s).  It has become more severe over the past 2 Months. The leakage occurs with cough, sneeze, laughing, standing, exercise, urgency and walking. She feels she is not able to empty well. She describes the leakage as small, moderate, large amounts of urine.  She states that she leaks many times a day. She uses 4-5 light pads per day.  She urinates every 1 hours per day and awakes 3-4 times per night to urinate. She had a baby on  reports tearing during the delivery, and symptoms worsened after. She feels that her vagina feels different, she is unsure if it is a bulge. She reports vulvar burning. She reports constipation, she plans to start taking Miralax.   She is breastfeeding currently. She is done having babies. She has regular menses, she has not gotten a period since giving birth.  She delivered approximately 1 month ago.    Symptoms have been worsening.  Conservative treatment options have included: kegels   Pelvic Floor Therapy:  No  Bladder medications: No  Fecal incontinence:  No  She states that she drink mainly Water 10 cups.  Generally, she drinks 0 caffeinated /carbonated drinks per day.    Past Medical History:   Diagnosis Date   • Cervical dysplasia     in her 20s   • GERD (gastroesophageal reflux disease)      Past Surgical History:   Procedure Laterality Date   • Lasik surgery     • Leep      2012   • Nose surgery        OB History    Para Term  AB Living   3 2 2 0 1 2   SAB IAB Ectopic Molar Multiple Live Births   1 0 0 0 0 2   Obstetric Comments   SAB at 6+0 wga,        Family History   Problem Relation Age of Onset   • Thyroid Mother    • Diabetes Father    • Patient is unaware of any medical problems Son       Social History     Tobacco Use   • Smoking status: Never   • Smokeless tobacco: Never   Substance Use Topics   • Alcohol use: No   • Drug use: No     Current Outpatient Medications   Medication Sig Dispense Refill   • Prenatal MV-Min-Fe Fum-FA-DHA (Prenatal Multi +DHA) 27-0.8-200 MG Cap Take 1 tablet by mouth daily. Please provide the free prenatal vitamins with DHA offered by your pharmacy 90 capsule 3     No current facility-administered medications for this visit.       Allergies:    ALLERGIES:   Allergen Reactions   • Nitrous Oxide Other (See Comments)     Unknown per patient       Review of Systems  Constitution:No fever, chills, night sweats, change in appetite, or change in weight.  HENT: Normocephalic, atraumatic.  Breasts: No lumps or bumps in the breasts and No discharge from the nipples  Cardiovascular: No chest pains and No irregular heartbeat}  Respiratory: No cough, shortness of breath, or wheezing.  Gastrointestinal: Constipation  Genitourinary: Per HPI  Integument: No rashes or skin lesions  Neurological: No headache, weakness, loss of sensation or visual disturbance  Musculosketal: No back pain, neck pain and joint pain  Endocrin: No hot or cold intolerance  Psychiatric: No Anxiety or Depression  Heme-Lymph: No Abnormal bleeding or Easy bruising  All other systems reviewed are negative    OBJECTIVE:   Visit Vitals  /78   Pulse 84   Resp 12   Ht 5' 5\" (1.651 m)   Wt 77.3 kg (170 lb 6 oz)   LMP 08/22/2022 (Approximate)   Breastfeeding Yes   BMI 28.35 kg/m²     Physical Exam:  General: alert, no distress  HEENT: Normocephalic, atraumatic  PERRLA  Thyroid:  not enlarged  Lymph Nodes:  No lumps or enlargement  Breasts:  not done  Lungs: lungs are clear bilaterally  no wheezes heard  Heart:  regular rate and rhythm, S1, S2 normal, no murmur, click, rub or  gallop  Abdomen: Soft, nontender, without masses or organomegaly  Extremities:  No cyanosis, clubbing  Neurologic:  Alert and oriented X3  Pelvic  Vulvar:   Atrophy: no  Vagina:   Discharge: no  Atrophy: no  Cystocele: yes, stage- 1   Rectocele: no  Vaginal [place]: Prolapse no   Vaginal Tone: Scale {RATIOS:2 out of 5  Urethra:   Hypermobility: yes, 60 degrees   Uterus:   Enlarged: no  Uterus Size: Retroverted, retroflexed, mobile  Prolapse: no  Adnexa:  Mass: no  Tender: no    ASSESSMENT    1. Pelvic floor weakness    2. Mixed stress and urge urinary incontinence        PLAN  Options were discussed with the patient at length.  She is eager to have surgery.  She was made to understand that conservative management should always be tried first..  She will be sent for pelvic floor therapy with biofeedback and e-stim to see if this helps her.  She will have urodynamic testing done.  She was made to understand that there are several different reasons for urinary  incontinence and if she has mixed incontinence it is better to do conservative management first.  If all of this fails then in the future she could have a native tissue anterior repair and a sling procedure.  If she does not want mesh then urethral bulking can be done as well.  I had a lengthy discussion with the patient regarding her symptomatic prolapse and consideration for surgery.  Procedure, risks, reasons, benefits and complications (including injury to bowel, bladder, major blood vessel, ureter, bleeding, possibility of transfusion, infection, DVT, fistula formation, or mesh erosion) were reviewed in detail.     Cinthya Jarvis PA-C    I, Dr. Bulmaro Frey, have reviewed the notes and assessments performed by the PA and agree with their documentation of the patient's care.  I have personally examined and documented the medical decision making in it's entirety.    Bulmaro Frey MD, FACOG

## 2023-10-19 DIAGNOSIS — F33.0 MAJOR DEPRESSIVE DISORDER, RECURRENT EPISODE, MILD (H): ICD-10-CM

## 2023-10-20 NOTE — TELEPHONE ENCOUNTER
"Request for medication refill: sertraline (ZOLOFT) 50 MG tablet     Providers if patient needs an appointment and you are willing to give a one month supply please refill for one month and  send a letter/MyChart using \".SMILLIMITEDREFILL\" .smillimited and route chart to \"[facility] \" (Giving one month refill in non controlled medications is strongly recommended before denial)    If refill has been denied, meaning absolutely no refills without visit, please complete the smart phrase \".smirxrefuse\" and route it to the \"[facility] MED REFILLS\"  pool to inform the patient and the pharmacy.    Gerard Bañuelos CMA    LV- 09/08/2022  "

## 2023-11-04 ENCOUNTER — HEALTH MAINTENANCE LETTER (OUTPATIENT)
Age: 58
End: 2023-11-04

## 2023-12-18 ENCOUNTER — TELEPHONE (OUTPATIENT)
Dept: FAMILY MEDICINE | Facility: CLINIC | Age: 58
End: 2023-12-18
Payer: COMMERCIAL

## 2023-12-18 DIAGNOSIS — U07.1 INFECTION DUE TO 2019 NOVEL CORONAVIRUS: Primary | ICD-10-CM

## 2023-12-18 NOTE — TELEPHONE ENCOUNTER
RN COVID TREATMENT VISIT  12/18/23      The patient has been triaged and does not require a higher level of care.    Donn Baptiste  57 year old  Current weight? 160    Has the patient been seen by a primary care provider at an [facility] within the past two years? Yes.   Have you been in close proximity to/do you have a known exposure to a person with a confirmed case of influenza? No.     General treatment eligibility:  Date of positive COVID test (PCR or at home)?  12/18/23    Are you or have you been hospitalized for this COVID-19 infection? No.   Have you received monoclonal antibodies or antiviral treatment for COVID-19 since this positive test? No.   Do you have any of the following conditions that place you at risk of being very sick from COVID-19?   - Age 50 years or older  - Mental health disorders including mood disorders, depression, schizophrenia spectrum disorders   Yes, patient has at least one high risk condition as noted above.     Current COVID symptoms:   - fever or chills  - cough  - fatigue  - muscle or body aches  - headache  - sore throat  - congestion or runny nose  Yes. Patient has at least one symptom as selected.     How many days since symptoms started? 5 days or less. Established patient, 12 years or older weighing at least 88.2 lbs, who has symptoms that started in the past 5 days, has not been hospitalized nor received treatment already, and is at risk for being very sick from COVID-19.     Treatment eligibility by RN:  Are you currently pregnant or nursing? No  Do you have a clinically significant hypersensitivity to nirmatrelvir or ritonavir, or toxic epidermal necrolysis (TEN) or Forte-Ari Syndrome? No  Do you have a history of hepatitis, any hepatic impairment on the Problem List (such as Child-Kim Class C, cirrhosis, fatty liver disease, alcoholic liver disease), or was the last liver lab (hepatic panel, ALT, AST, ALK Phos,  bilirubin) elevated in the past 6 months? No  Do you have any history of severe renal impairment (eGFR < 30mL/min)? No    Is patient eligible to continue? Yes, patient meets all eligibility requirements for the RN COVID treatment (as denoted by all no responses above).     Current Outpatient Medications   Medication Sig Dispense Refill    bisacodyl (DULCOLAX) 5 MG EC tablet Take 2 tablets at 3 pm the day before your procedure. If your procedure is before 11 am, take 2 additional tablets at 11 pm. If your procedure is after 11 am, take 2 additional tablets at 6 am. For additional instructions refer to your colonoscopy prep instructions. 4 tablet 0    cetirizine (ZYRTEC) 10 MG tablet Take 1 tablet (10 mg) by mouth daily 90 tablet 3    fluticasone (FLONASE) 50 MCG/ACT spray Spray 1-2 sprays into both nostrils daily 16 g 3    IBUPROFEN PO       polyethylene glycol (GOLYTELY) 236 g suspension The night before the exam at 6 pm drink an 8-ounce glass every 15 minutes until the jug is half empty. If you arrive before 11 AM: Drink the other half of the Golytely jug at 11 PM night before procedure. If you arrive after 11 AM: Drink the other half of the Golytely jug at 6 AM day of procedure. For additional instructions refer to your colonoscopy prep instructions. 4000 mL 0    sertraline (ZOLOFT) 50 MG tablet TAKE 1 TABLET BY MOUTH EVERY DAY 90 tablet 3       Medications from List 1 of the standing order (on medications that exclude the use of Paxlovid) that patient is taking: NONE. Is patient taking Trina's Wort? No  Is patient taking [place]'s Wort or any meds from List 1? No.   Medications from List 2 of the standing order (on meds that provider needs to adjust) that patient is taking: NONE. Is patient on any of the meds from List 2? No.   Medications from List 3 of standing order (on meds that a RN needs to adjust) that patient is taking: NONE. Is patient on any meds from List 3? No.     Paxlovid has an approximate 90%  reduction in hospitalization. Paxlovid can possibly cause altered sense of taste, diarrhea (loose, watery stools), high blood pressure, muscle aches.     Would patient like a Paxlovid prescription?   Yes.   Lab Results   Component Value Date    GFRESTIMATED >90 01/13/2019       Was last eGFR reduced? No, eGFR 60 or greater/ No Result on record. Patient can receive the normal renal function dose. Paxlovid Rx sent to [place] pharmacy   [facility]    Temporary change to home medications: None    All medication adjustments (holds, etc) were discussed with the patient and patient was asked to repeat back (teachback) their med adjustment.  Did patient understand med adjustment? No medication adjustments needed.         Reviewed the following instructions with the patient:    Paxlovid (nimatrelvir and ritonavir)    How it works  Two medicines (nirmatrelvir and ritonavir) are taken together. They stop the virus from growing. Less amount of virus is easier for your body to fight.    How to take  Medicine comes in a daily container with both medicine tablets. Take by mouth twice daily (once in the morning, once at night) for 5 days.  The number of tablets to take varies by patient.  Don't chew or break capsules. Swallow whole.    When to take  Take as soon as possible after positive COVID-19 test result, and within 5 days of your first symptoms.    Possible side effects  Can cause altered sense of taste, diarrhea (loose, watery stools), high blood pressure, muscle aches.    Edilma Mata RN

## 2024-02-07 ENCOUNTER — TRANSFERRED RECORDS (OUTPATIENT)
Dept: HEALTH INFORMATION MANAGEMENT | Facility: CLINIC | Age: 59
End: 2024-02-07
Payer: COMMERCIAL

## 2024-03-12 ENCOUNTER — OFFICE VISIT (OUTPATIENT)
Dept: FAMILY MEDICINE | Facility: CLINIC | Age: 59
End: 2024-03-12
Payer: COMMERCIAL

## 2024-03-12 VITALS
HEIGHT: 67 IN | DIASTOLIC BLOOD PRESSURE: 79 MMHG | SYSTOLIC BLOOD PRESSURE: 125 MMHG | OXYGEN SATURATION: 96 % | BODY MASS INDEX: 25.06 KG/M2 | TEMPERATURE: 97.7 F | HEART RATE: 78 BPM

## 2024-03-12 DIAGNOSIS — M54.42 CHRONIC BILATERAL LOW BACK PAIN WITH LEFT-SIDED SCIATICA: Primary | ICD-10-CM

## 2024-03-12 DIAGNOSIS — G89.29 CHRONIC BILATERAL LOW BACK PAIN WITH LEFT-SIDED SCIATICA: Primary | ICD-10-CM

## 2024-03-12 DIAGNOSIS — S39.011A STRAIN OF ABDOMINAL WALL, INITIAL ENCOUNTER: ICD-10-CM

## 2024-03-12 PROCEDURE — 99213 OFFICE O/P EST LOW 20 MIN: CPT | Mod: 25 | Performed by: FAMILY MEDICINE

## 2024-03-12 PROCEDURE — 90471 IMMUNIZATION ADMIN: CPT | Performed by: FAMILY MEDICINE

## 2024-03-12 PROCEDURE — 90480 ADMN SARSCOV2 VAC 1/ONLY CMP: CPT | Performed by: FAMILY MEDICINE

## 2024-03-12 PROCEDURE — 90746 HEPB VACCINE 3 DOSE ADULT IM: CPT | Performed by: FAMILY MEDICINE

## 2024-03-12 PROCEDURE — 91320 SARSCV2 VAC 30MCG TRS-SUC IM: CPT | Performed by: FAMILY MEDICINE

## 2024-03-12 ASSESSMENT — PATIENT HEALTH QUESTIONNAIRE - PHQ9
10. IF YOU CHECKED OFF ANY PROBLEMS, HOW DIFFICULT HAVE THESE PROBLEMS MADE IT FOR YOU TO DO YOUR WORK, TAKE CARE OF THINGS AT HOME, OR GET ALONG WITH OTHER PEOPLE: NOT DIFFICULT AT ALL
SUM OF ALL RESPONSES TO PHQ QUESTIONS 1-9: 7
SUM OF ALL RESPONSES TO PHQ QUESTIONS 1-9: 7

## 2024-03-12 NOTE — PROGRESS NOTES
"  Assessment & Plan     Chronic Intermittent superficial abdominal pain exacerbated by standing, sometimes with LBP. Suspect core strengthening, lumbar stabilization would help. Patient referred to PT.     Chronic bilateral low back pain with left-sided sciatica  - Physical Therapy  Referral; Future    Strain of abdominal wall, initial encounter  - Physical Therapy  Referral; Future      No follow-ups on file.    Joan Pop is a 58 year old, presenting for the following health issues:  Gastrointestinal Problem (Abdominal pain for couple days and back pain  since yesterday)      3/12/2024     8:07 AM   Additional Questions   Roomed by Iftin   Accompanied by self         3/12/2024    Information    services provided? No     HPI     Intermittent superficial abdominal pain for the last ~5 years  Yesterday, while driving pain started in abdomen as well as mid back  Pain is sometimes exacerbated by standing for long periods  Pain causing doubling over, about 6/10 at worst  Pain lasts about a minute, resolves with adjusting position, returns in episodes during a day  Pain occurs once/month  Resolves with ibuprofen  No abdominal surgeries  No N/V, no constipation      Review of Systems  Constitutional, HEENT, cardiovascular, pulmonary, gi and gu systems are negative, except as otherwise noted.      Objective    /79 (BP Location: Left arm, Patient Position: Sitting, Cuff Size: Adult Regular)   Pulse 78   Temp 97.7  F (36.5  C) (Oral)   Ht 1.702 m (5' 7\")   SpO2 96%   BMI 25.06 kg/m    Body mass index is 25.06 kg/m .  Physical Exam   GENERAL: alert and no distress  NECK: no adenopathy, no asymmetry, masses, or scars  RESP: lungs clear to auscultation - no rales, rhonchi or wheezes  CV: regular rate and rhythm, normal S1 S2, no S3 or S4, no murmur, click or rub, no peripheral edema  ABDOMEN: soft, nontender, no hepatosplenomegaly, no masses and bowel sounds " normal  BACK: no CVA tenderness, lumbar paraspinal hypertonicity           Signed Electronically by: Lexie Saunders DO

## 2024-03-12 NOTE — LETTER
To Whom it May Concern:    Donn Baptiste is under my care and was seen for a clinic visit on 3/12/24. 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 The BAHA hearing processor was misplaced, he requires a replacement processor to continue treatment of hearing loss. This is medically necessary as impaired hearing has a significant impact on quality of life.    Sincerely,        Lexie Saunders

## 2024-03-13 ENCOUNTER — TELEPHONE (OUTPATIENT)
Dept: FAMILY MEDICINE | Facility: CLINIC | Age: 59
End: 2024-03-13
Payer: COMMERCIAL

## 2024-03-13 DIAGNOSIS — F19.11 HISTORY OF SUBSTANCE ABUSE (H): ICD-10-CM

## 2024-03-13 DIAGNOSIS — D33.3 ACOUSTIC NEUROMA (H): ICD-10-CM

## 2024-03-13 DIAGNOSIS — N20.0 NEPHROLITHIASIS: Primary | ICD-10-CM

## 2024-03-13 NOTE — PROGRESS NOTES
Patient was evaluated yesterday afternoon in an external ED after developing acute worsening of back and abdominal pain. No record available in [facility] or Care Everywhere yet, but he reports diagnosis of kidney stone. Was discharged with prescription for Flomax and pain medication and instructed to follow-up with Urology. No prior stones, not on diuretic.     - Urology referral  - Will need access to external records/imaging  - RN to call for status update and ELÍAS if needed    Sheila Mantilla MD

## 2024-03-13 NOTE — TELEPHONE ENCOUNTER
Called and spoke to pt who reports that they are doing much better than yesterday. Pt reports that they have been alternating between Tylenol, Ibuprofen, and OxyContin. Pt reports that pain is well controlled and does not need any refills at this time. RN advised pt to let us know if they need anything. Discussed red flag symptoms and when to call clinic or go to UC/ED to be evaluated. Pt verbalized understanding. RN sent MyChart with number to call Urology if they do not hear from them.     Edilma Mata RN        ----- Message from Sheila Mantilla MD sent at 3/13/2024  7:54 AM CDT -----  Diagnosed with kidney stone in external ED yesterday. I've already placed urology referral. Can he get an RN call sometime today to check in on his pain control please? Thank you!    WILI

## 2024-03-19 NOTE — CONFIDENTIAL NOTE
Chief Complaint:   Kidney stones         History of Present Illness:   Donn Baptiste is a 58 year old male presenting for an evaluation of kidney stones.     The patient presented to the ED on 3/12/2024 with abdominal pain. UA was notable for 2-5 RBCs. WBC count and renal function were WNL. CT abdomen pelvis with contrast was notable for a 2 mm stone in the distal left ureter and a non-obstructing left intrarenal stone.     He is doing well today. His left flank pain resolved about two days after his ED visit. He denies dysuria and gross hematuria.     This was his first kidney stone.          Past Medical History:     Past Medical History:   Diagnosis Date    Allergic rhinitis     Chemical dependency (H) 10/14/2009    Conductive hearing loss     Depressive disorder     Hearing problem             Past Surgical History:     Past Surgical History:   Procedure Laterality Date    COLONOSCOPY N/A 11/28/2022    Procedure: COLONOSCOPY, WITH POLYPECTOMY;  Surgeon: Alexus Cote MD;  Location: [facility] OR    IMPLANT BAHA Right 4/26/2019    Procedure: Ossteointegrated Implant with sound processor with the cochlear device-RIGHT;  Surgeon: Nat Camarena MD;  Location:  OR            Medications     Current Outpatient Medications   Medication    bisacodyl (DULCOLAX) 5 MG EC tablet    cetirizine (ZYRTEC) 10 MG tablet    fluticasone (FLONASE) 50 MCG/ACT spray    IBUPROFEN PO    polyethylene glycol (GOLYTELY) 236 g suspension    sertraline (ZOLOFT) 50 MG tablet     No current facility-administered medications for this visit.            Allergies:   Amoxicillin         Review of Systems:  From intake questionnaire   Negative 14 system review except as noted on HPI, nurse's note.         Physical Exam:   Patient is a 58 year old male evaluated via video visit.       Labs and Pathology:    I personally reviewed all applicable laboratory data and went over findings with patient  Significant for:    CBC RESULTS:  Recent  Labs   Lab Test 19  1455   WBC 6.6   HGB 15.4           BMP RESULTS:  Recent Labs   Lab Test 19  1052 19  1455   NA  --  141   POTASSIUM  --  4.1   CHLORIDE  --  107   CO2  --  25   ANIONGAP  --  9   GLC 72.0 105*   BUN  --  12   CR  --  0.72   GFRESTIMATED  --  >90   GFRESTBLACK  --  >90   CELIA  --  8.4*            Assessment and Plan:   Assessment: Donn Baptiste is a 58 year old male seen in evaluation for a 2 mm stone in the distal left ureter, found on CT from 3/12/2024. This was his first kidney stone. He has been asymptomatic for about a week.     We discussed that based on the size and position of the stone, he has a high likelihood of spontaneous stone passage. I recommended follow up CT imaging to confirm stone passage. The patient is in agreement.       Plan:  CT abdomen pelvis without contrast in 2-3 weeks to assess for stone passage.   We reviewed general recommendations to prevent kidney stones including the followin) Low oxalate diet. We discussed foods that are particularly high in oxalate including spinach, rhubarb, beets, nuts, nut butters, and black teas.     2) Low salt diet. Discussed common foods with high amounts of salt as well as dietary strategies to minimize sodium.     3) High fluid intake. Recommend 3 liters of fluid daily to produce goal of 2.5L of urine.     4) Modest animal protein. Recommend limiting animal protein to one serving or less daily.     5) Normal dietary calcium.       Jaclyn Adams PA-C  Department of Urology

## 2024-03-20 ENCOUNTER — VIRTUAL VISIT (OUTPATIENT)
Dept: UROLOGY | Facility: CLINIC | Age: 59
End: 2024-03-20
Payer: COMMERCIAL

## 2024-03-20 DIAGNOSIS — N20.0 NEPHROLITHIASIS: ICD-10-CM

## 2024-03-20 PROCEDURE — 99203 OFFICE O/P NEW LOW 30 MIN: CPT | Mod: 95 | Performed by: STUDENT IN AN ORGANIZED HEALTH CARE EDUCATION/TRAINING PROGRAM

## 2024-03-20 NOTE — PROGRESS NOTES
Donn Baptiste is a 58 year old year old who is being evaluated via a billable video visit.      How would you like to obtain your AVS? MyChart  If the video visit is dropped, the invitation should be resent by: Send to e-mail at: [email or web address]  Will anyone else be joining your video visit? No    Video-Visit Details    Type of service:  Video Visit     Originating Location (pt. Location): Home    Distant Location (provider location):  Off-site  Platform used for Video Visit: Zahra

## 2024-03-21 ENCOUNTER — OFFICE VISIT (OUTPATIENT)
Dept: DERMATOLOGY | Facility: CLINIC | Age: 59
End: 2024-03-21
Payer: COMMERCIAL

## 2024-03-21 DIAGNOSIS — L82.0 INFLAMED SEBORRHEIC KERATOSIS: ICD-10-CM

## 2024-03-21 DIAGNOSIS — Z85.820 HISTORY OF MELANOMA: Primary | ICD-10-CM

## 2024-03-21 DIAGNOSIS — D22.9 MULTIPLE BENIGN NEVI: ICD-10-CM

## 2024-03-21 DIAGNOSIS — L57.8 ACTINIC SKIN DAMAGE: ICD-10-CM

## 2024-03-21 PROCEDURE — 99203 OFFICE O/P NEW LOW 30 MIN: CPT | Mod: 25 | Performed by: STUDENT IN AN ORGANIZED HEALTH CARE EDUCATION/TRAINING PROGRAM

## 2024-03-21 PROCEDURE — 17110 DESTRUCTION B9 LES UP TO 14: CPT | Mod: GC | Performed by: STUDENT IN AN ORGANIZED HEALTH CARE EDUCATION/TRAINING PROGRAM

## 2024-03-21 ASSESSMENT — PAIN SCALES - GENERAL: PAINLEVEL: NO PAIN (0)

## 2024-03-21 NOTE — LETTER
3/21/2024       RE: Donn Baptiste  [address]  [place] [address]     Dear Colleague,    Thank you for referring your patient, Donn Baptiste, to the [facility] [place] at [facility]. Please see a copy of my visit note below.    [facility] Dermatology Note  Encounter Date: Mar 21, 2024  Office Visit     Dermatology Problem List:  1. Malignant melanoma, Breslow depth 0.4mm, L upper back; s/p Bx 1/30/2020; s/p excision 3/2/20    ____________________________________________    Assessment & Plan:       #  Hx Melanoma  No evidence of recurrent disease  - ABCDEs of melanoma were reviewed      # Multiple Benign Nevi  # Seborrheic Keratosis  # Cherry Angioma  # Solar Lentigo  Reassured regarding benign etiology. No intervention indicated.  - Sun protection, including sunscreen 30+ SPF advised     # Inflamed seborrheic keratosis  Reassured regarding benign etiology. Given irritation and inflammation, will treat with LN cryo  - LN cryotherapy (see note)    Procedures Performed:   - PROCEDURE - Crotherapy:  1  Lesions were frozen with liquid nitrogen with a 10 sec thaw time for a total of 2 cycles. Usual warnings including bleeding, infection, recurrence, scarring, and pigmentation change were reviewed. After care instructions were reviewed and written hand out was provided.      Follow-up: 6 month(s) in-person, or earlier for new or changing lesions    Staff and Resident:     Andre Li MD  Internal Medicine - Dermatology (PGY-3)   ____________________________________________    CC: Derm Problem (FBSE- spots of concern on back)    HPI:  Mr. Donn Baptiste is a(n) 58 year old male who presents today as a new patient for full body skin exam. Patient had a melanoma (BD 0.4 mm) in the left upper back, which was excised in 3/2/2020. Since the pandemic, patient has not been back for skin checks. No  new, painful, growing, tender lesions of concern today. Does have inflamed spot on the left thigh.      Labs Reviewed:  N/A    Physical Exam:  Vitals: There were no vitals taken for this visit.  SKIN: Full skin, which includes the head/face, both arms, chest, back, abdomen,both legs, genitalia and/or groin buttocks, digits and/or nails, was examined.  - Multiple regular brown pigmented macules and papules are identified on the trunk and extremities . Inflamed papule on the left medial thigh.  - Scattered brown macules on sun exposed areas.  - There are waxy stuck on tan to brown papules on the trunk and extremities ..   - No other lesions of concern on areas examined.     Medications:  Current Outpatient Medications   Medication    bisacodyl (DULCOLAX) 5 MG EC tablet    cetirizine (ZYRTEC) 10 MG tablet    fluticasone (FLONASE) 50 MCG/ACT spray    IBUPROFEN PO    polyethylene glycol (GOLYTELY) 236 g suspension    sertraline (ZOLOFT) 50 MG tablet     No current facility-administered medications for this visit.      Past Medical History:   Patient Active Problem List   Diagnosis    Seasonal allergic rhinitis    Major depressive disorder, recurrent episode, mild (H24)    Family history of colon cancer    Acoustic neuroma (H)     Past Medical History:   Diagnosis Date    Allergic rhinitis     Chemical dependency (H) 10/14/2009    Conductive hearing loss     Depressive disorder     Hearing problem        CC Referred Self, MD  No address on file on close of this encounter.    Attestation signed by Rudi Young MD at 3/21/2024  3:56 PM:  I have personally examined this patient and agree with the resident doctor's documentation and plan of care. I have reviewed and amended the resident's note. The documentation accurately reflects my clinical observations, diagnoses, treatment and follow-up plans. I was present for key portions of the procedure(s).       Rudi Young MD  Dermatology Staff

## 2024-03-21 NOTE — PATIENT INSTRUCTIONS
Checking for Skin Cancer  You can help find cancer early by checking your skin each month. There are 3 main kinds of skin cancer: melanoma, basal cell carcinoma, and squamous cell carcinoma. Doing monthly skin checks is the best way to find new marks, sores, or skin changes. Follow these instructions for checking your skin.   The ABCDEs of checking moles for melanoma   Check your moles or growths for signs of melanoma using ABCDE:   Asymmetry: The sides of the mole or growth don t match.  Border: The edges are ragged, notched, or blurred.  Color: The color within the mole or growth varies. It could be black, brown, tan, white, or shades of red, gray, or blue.  Diameter: The mole or growth is larger than   inch or 6 mm (size of a pencil eraser).  Evolving: The size, shape, texture, or color of the mole or growth is changing.     ABCDE's of moles on light skin.        ABCDE's of moles on dark skin may be harder to identify.     Checking for other types of skin cancer  Basal cell carcinoma or squamous cell carcinoma cause symptoms like:     A spot or mole that looks different from all other marks on your skin  Changes in how an area feels, such as itching, tenderness, or pain  Changes in the skin's surface, such as oozing, bleeding, or scaliness  A sore that doesn't heal  New swelling, redness, or spread of color beyond the border of a mole    Who s at risk?  Anyone of any skin color can get skin cancer. But you're at greater risk if you have:   Fair skin that freckles easily and burns instead of tanning  Light-colored or red hair  Light-colored eyes  Many moles or abnormal moles on your skin  A long history of unprotected exposure to sunlight or tanning beds  A history of many blistering sunburns as a child or teen  A family history of skin cancer  Been exposed to radiation or chemicals  A weakened immune system  Been exposed to arsenic  If you've had skin cancer in the past, you're at high risk of having it again.    How to check your skin  Do your monthly skin checkups in front of a full-length mirror. Use a room with good lighting so it's easier to see. Use a hand mirror to look at hard-to-see places like your buttocks and back. You can also have a trusted friend or family member help you with these checks. Check every part of your body, including your:   Head (ears, face, neck, and scalp)  Torso (front, back, sides, and under breasts)  Arms (tops, undersides, and armpits)  Hands (palms, backs, and fingers, including under the nails)  Lower back, buttocks, and genitals  Legs (front, back, and sides)  Feet (tops, soles, toes, including under the nails, and between toes)  Watch for new spots on your skin or a spot that's changing in color, shape, size.   If you have a lot of moles, take digital photos of them each month. Make sure to take photos both up close and from a distance. These can help you see if any moles change over time.   Know your skin  Most skin changes aren't cancer. But if you see any changes in your skin, call your healthcare provider right away. Only they can tell you if a change is a problem. If you have skin cancer, seeing your provider can be the first step to getting the treatment that could save your life.   S[employer] last reviewed this educational content on 10/1/2021    7870-3149 [employer]. All rights reserved. This information is not intended as a substitute for professional medical care. Always follow your healthcare professional's instructions.      Cryotherapy    What is it?  Use of a very cold liquid, such as liquid nitrogen, to freeze and destroy abnormal skin cells that need to be removed    What should I expect?  Tenderness and redness  A small blister that might grow and fill with dark purple blood. There may be crusting.  More than one treatment may be needed if the lesions do not go away.    How do I care for the treated area?  Gently wash the area with your hands when  bathing.  Use a thin layer of Vaseline to help with healing. You may use a Band-Aid.   The area should heal within 7-10 days and may leave behind a pink or lighter color.   Do not use an antibiotic or Neosporin ointment.   You may take acetaminophen (Tylenol) for pain.     Call your doctor if you have:  Severe pain  Signs of infection (warmth, redness, cloudy yellow drainage, and or a bad smell)  Questions or concerns    Who should I call with questions?      [facility]: [phone]      [facility]: [phone]      For urgent needs outside of business hours call the [facility] at [phone] and ask for the dermatology resident on call

## 2024-03-21 NOTE — PROGRESS NOTES
[facility] Dermatology Note  Encounter Date: Mar 21, 2024  Office Visit     Dermatology Problem List:  1. Malignant melanoma, Breslow depth 0.4mm, L upper back; s/p Bx 1/30/2020; s/p excision 3/2/20    ____________________________________________    Assessment & Plan:       #  Hx Melanoma  No evidence of recurrent disease  - ABCDEs of melanoma were reviewed      # Multiple Benign Nevi  # Seborrheic Keratosis  # Cherry Angioma  # Solar Lentigo  Reassured regarding benign etiology. No intervention indicated.  - Sun protection, including sunscreen 30+ SPF advised     # Inflamed seborrheic keratosis  Reassured regarding benign etiology. Given irritation and inflammation, will treat with LN cryo  - LN cryotherapy (see note)    Procedures Performed:   - PROCEDURE - Crotherapy:  1  Lesions were frozen with liquid nitrogen with a 10 sec thaw time for a total of 2 cycles. Usual warnings including bleeding, infection, recurrence, scarring, and pigmentation change were reviewed. After care instructions were reviewed and written hand out was provided.      Follow-up: 6 month(s) in-person, or earlier for new or changing lesions    Staff and Resident:     Andre Li MD  Internal Medicine - Dermatology (PGY-3)   ____________________________________________    CC: Derm Problem (FBSE- spots of concern on back)    HPI:  Mr. Donn Baptiste is a(n) 58 year old male who presents today as a new patient for full body skin exam. Patient had a melanoma (BD 0.4 mm) in the left upper back, which was excised in 3/2/2020. Since the pandemic, patient has not been back for skin checks. No new, painful, growing, tender lesions of concern today. Does have inflamed spot on the left thigh.      Labs Reviewed:  N/A    Physical Exam:  Vitals: There were no vitals taken for this visit.  SKIN: Full skin, which includes the head/face, both arms, chest, back, abdomen,both legs, genitalia and/or groin buttocks, digits  and/or nails, was examined.  - Multiple regular brown pigmented macules and papules are identified on the trunk and extremities . Inflamed papule on the left medial thigh.  - Scattered brown macules on sun exposed areas.  - There are waxy stuck on tan to brown papules on the trunk and extremities ..   - No other lesions of concern on areas examined.     Medications:  Current Outpatient Medications   Medication    bisacodyl (DULCOLAX) 5 MG EC tablet    cetirizine (ZYRTEC) 10 MG tablet    fluticasone (FLONASE) 50 MCG/ACT spray    IBUPROFEN PO    polyethylene glycol (GOLYTELY) 236 g suspension    sertraline (ZOLOFT) 50 MG tablet     No current facility-administered medications for this visit.      Past Medical History:   Patient Active Problem List   Diagnosis    Seasonal allergic rhinitis    Major depressive disorder, recurrent episode, mild (H24)    Family history of colon cancer    Acoustic neuroma (H)     Past Medical History:   Diagnosis Date    Allergic rhinitis     Chemical dependency (H) 10/14/2009    Conductive hearing loss     Depressive disorder     Hearing problem        CC Referred Self, MD  No address on file on close of this encounter.

## 2024-03-21 NOTE — NURSING NOTE
Dermatology Rooming Note    Donn Baptiste's goals for this visit include:   Chief Complaint   Patient presents with    Derm Problem     FBSE- spots of concern on back     Anel Bey, EMT

## 2024-04-09 ENCOUNTER — ANCILLARY PROCEDURE (OUTPATIENT)
Dept: CT IMAGING | Facility: CLINIC | Age: 59
End: 2024-04-09
Attending: STUDENT IN AN ORGANIZED HEALTH CARE EDUCATION/TRAINING PROGRAM
Payer: COMMERCIAL

## 2024-04-09 DIAGNOSIS — N20.0 NEPHROLITHIASIS: ICD-10-CM

## 2024-04-09 PROCEDURE — 74176 CT ABD & PELVIS W/O CONTRAST: CPT | Performed by: RADIOLOGY

## 2024-06-10 ENCOUNTER — MYC MEDICAL ADVICE (OUTPATIENT)
Dept: AUDIOLOGY | Facility: CLINIC | Age: 59
End: 2024-06-10
Payer: COMMERCIAL

## 2024-06-11 ENCOUNTER — TELEPHONE (OUTPATIENT)
Dept: DERMATOLOGY | Facility: CLINIC | Age: 59
End: 2024-06-11
Payer: COMMERCIAL

## 2024-06-11 NOTE — TELEPHONE ENCOUNTER
You have an appointment currently scheduled with Dr. Young on 9/24/24.  Due to changes to the provider's schedule, we need to reschedule your appointment.      Please use your MyChart or call [phone] to reschedule this appointment at your earliest convenience.    We apologize for any inconvenience this may cause, and thank you for trusting [facility] with your care.    Sincerely, [facility]

## 2024-06-11 NOTE — TELEPHONE ENCOUNTER
Spoke to Kenneth. Explained BAHA order process. He confirmed he never received after attempted upgrade submission with audiology in 2022 as the device cost was too expensive but wants to try again as insurance may cover more. Writer explained process for submission and sent [email or web address] message with info he could use to complete form (per his request). Noted we can schedule a potential fitting when he submits order.    Jo CRUZ 6/11/24

## 2024-06-13 NOTE — TELEPHONE ENCOUNTER
Patient confirmed scheduled appointment:     Date: 11/8  Time: 2:30 PM  Visit type: Return dermatology  Provider: Dr. Young  Location: [facility]  Additonal Notes: Rescheduled from 9/24

## 2024-07-18 ENCOUNTER — MYC MEDICAL ADVICE (OUTPATIENT)
Dept: AUDIOLOGY | Facility: CLINIC | Age: 59
End: 2024-07-18
Payer: COMMERCIAL

## 2024-07-25 ENCOUNTER — OFFICE VISIT (OUTPATIENT)
Dept: AUDIOLOGY | Facility: CLINIC | Age: 59
End: 2024-07-25
Payer: COMMERCIAL

## 2024-07-25 DIAGNOSIS — H90.3 SNHL (SENSORY-NEURAL HEARING LOSS), ASYMMETRICAL: Primary | ICD-10-CM

## 2024-07-25 PROCEDURE — 92622 DX ALY AUD OI SND PRCSR 1ST: CPT | Performed by: AUDIOLOGIST

## 2024-07-25 NOTE — PROGRESS NOTES
AUDIOLOGY REPORT    SUBJECTIVE: Donn Baptiste is a 58 year old male who was seen in the Audiology Clinic at the [facility] for a fitting of a Cochlear BAHA 6 Max upgrade device. Previous results have revealed a normal hearing with a moderate sensorineural hearing loss notch at 4 kHz only with 100% word recognition score in the left ear and a borderline normal sloping to profound/unmeasureable sensorineural hearing loss with 0% word recognition in the right ear. The patient has a history of vestibular schwannoma treated with targeted radiation. Donn was previously fit with a right Cochlear BAHA 5 processor on 7/29/19. He was surgically implanted with abutment by Dr. Nat Camarena on 4/26/19.  The patient lost his BAHA device at the beginning of 2022. It was out of warranty and patient did not pursue replacement due to cost. He elected to try again/upgrade when the BAHA 6 came out. He returns today for an upgrade fitting. He was accompanied today by his wife.   OBJECTIVE: BAHA site was inspected and appeared healthy. Patient denies pain, drainage, or concerns. Abutment is securely in place. Device was placed and provided a good fit. Feedback test and in-situ measurements were completed. Donn was oriented to proper hearing aid use, care, cleaning (no water, dry brush), batteries (size: 312, insertion/removal, toxicity, low-battery signal), aid insertion/removal, user booklet, warranty information, storage cases, and other hearing aid details. The patient confirmed understanding of hearing aid use and care, and showed proper insertion of hearing aid and batteries while in the office today.Donn reported good volume and sound quality today.   Hearing aids were programmed as follows:  Program 1:All Around   Program 2: Speech in Noise  Program 3: Music  Program 4: Outdoor    EAR(S) FIT: Right  HEARING AID MODEL NAME: Cochlear BAHA 6 Max  HEARING AID  STYLE: BAHA  EARMOLDS/TIP: not applicable  SERIAL NUMBERS: Right: [id number] Left: N/A  WARRANTY END DATE: 8/1/2026  The hearing aids were successfully connected to the patient's phone and to the Cochlear dalia. They were shown how to utilize the bluetooth to stream audio and phone calls and the dalia was demonstrated. The patient was given additional resources regarding connectivity and bluetooth support to reference if needed.    Approximately 40 minutes was spent in direct analysis assessing, calibrating, programming and confirming processor performance of bone-anchored device  ASSESSMENT: A left Cochlear BAHA 6 Max processor was fit today. Verification measures were performed. Patient was counseled that exact out of pocket amounts cannot be determined for hearing aid claims being sent to insurance. Any insurance coverage information presented to the patient is an estimate only, and is not a guarantee of payment. Patient has been advised to check with their own insurance.    PLAN:Donn will return for follow-up as needed. Please call this clinic with questions regarding today s appointment.    Abimael Zuleta. CCC-A  Licensed Audiologist   [place] #[id number]

## 2024-10-31 DIAGNOSIS — F33.0 MAJOR DEPRESSIVE DISORDER, RECURRENT EPISODE, MILD (H): ICD-10-CM

## 2024-10-31 NOTE — TELEPHONE ENCOUNTER
"Request for medication refill:      sertraline (ZOLOFT) 50 MG tablet       Providers if patient needs an appointment and you are willing to give a one month supply please refill for one month and  send a letter/MyChart using \".SMILLIMITEDREFILL\" .smillimited and route chart to \"[facility] \" (Giving one month refill in non controlled medications is strongly recommended before denial)    If refill has been denied, meaning absolutely no refills without visit, please complete the smart phrase \".smirxrefuse\" and route it to the \"[facility] MED REFILLS\"  pool to inform the patient and the pharmacy.    Coco Flaherty MA      "

## 2024-12-22 ENCOUNTER — HEALTH MAINTENANCE LETTER (OUTPATIENT)
Age: 59
End: 2024-12-22

## 2025-01-18 ENCOUNTER — TRANSFERRED RECORDS (OUTPATIENT)
Dept: HEALTH INFORMATION MANAGEMENT | Facility: CLINIC | Age: 60
End: 2025-01-18
Payer: COMMERCIAL

## 2025-03-25 ENCOUNTER — OFFICE VISIT (OUTPATIENT)
Dept: DERMATOLOGY | Facility: CLINIC | Age: 60
End: 2025-03-25
Attending: STUDENT IN AN ORGANIZED HEALTH CARE EDUCATION/TRAINING PROGRAM
Payer: COMMERCIAL

## 2025-03-25 DIAGNOSIS — L81.4 SOLAR LENTIGO: ICD-10-CM

## 2025-03-25 DIAGNOSIS — L57.8 ACTINIC SKIN DAMAGE: ICD-10-CM

## 2025-03-25 DIAGNOSIS — D22.9 MULTIPLE BENIGN NEVI: ICD-10-CM

## 2025-03-25 DIAGNOSIS — Z85.820 HISTORY OF MELANOMA: Primary | ICD-10-CM

## 2025-03-25 DIAGNOSIS — D18.01 CHERRY ANGIOMA: ICD-10-CM

## 2025-03-25 DIAGNOSIS — L82.1 SEBORRHEIC KERATOSIS: ICD-10-CM

## 2025-03-25 ASSESSMENT — PAIN SCALES - GENERAL: PAINLEVEL_OUTOF10: NO PAIN (0)

## 2025-03-25 NOTE — PROGRESS NOTES
[facility] Dermatology Note  Encounter Date: Mar 25, 2025  Office Visit     Dermatology Problem List:  1. Malignant melanoma, Breslow depth 0.4mm, L upper back; s/p Bx 1/30/2020; s/p excision 3/2/20     ____________________________________________    Assessment & Plan:     #  Hx Melanoma  No evidence of recurrent disease  - ABCDEs of melanoma were reviewed    # Benign skin lesions: solar lentigines, seborrheic keratoses, cherry angiomas, clinically banal nevi  # Actinic skin damage  - Discussed benign nature of these lesions  - No treatment necessary      Procedures Performed:   None    Follow-up: 1 year(s) in-person, or earlier for new or changing lesions    Staff and Resident:  Lorenzo DOUGLAS MD, discussed and evaluated the patient with Dr. Young.    ____________________________________________    CC: Skin Check (Donn is here today for a skin check; Hx of melanoma)    HPI:  Mr. Donn Baptiste is a(n) 59 year old male who presents today as a return patient for skin check. Last seen in derm clinic 3/2024 with no concerning findings at that visit. No new, painful, growing, tender lesions of concern today.     Labs Reviewed:  N/A    Physical Exam:  Vitals: There were no vitals taken for this visit.  SKIN: Full skin, which includes the head/face, both arms, chest, back, abdomen,both legs, genitalia and/or groin buttocks, digits and/or nails, was examined.  - Tan-brown evenly pigmented macules on sun exposed areas  - Bright red dome shaped papules on trunk  - Tan and brown waxy stuck on papules on trunk  - No cervical, clavicular or axillary LAD  - No other lesions of concern on areas examined.     Medications:  Current Outpatient Medications   Medication Sig Dispense Refill    IBUPROFEN PO       sertraline (ZOLOFT) 50 MG tablet TAKE 1 TABLET BY MOUTH EVERY DAY 90 tablet 3    bisacodyl (DULCOLAX) 5 MG EC tablet Take 2 tablets at 3 pm the day before your procedure. If your procedure  is before 11 am, take 2 additional tablets at 11 pm. If your procedure is after 11 am, take 2 additional tablets at 6 am. For additional instructions refer to your colonoscopy prep instructions. (Patient not taking: Reported on 3/25/2025) 4 tablet 0    cetirizine (ZYRTEC) 10 MG tablet Take 1 tablet (10 mg) by mouth daily (Patient not taking: Reported on 3/25/2025) 90 tablet 3    fluticasone (FLONASE) 50 MCG/ACT spray Spray 1-2 sprays into both nostrils daily (Patient not taking: Reported on 3/25/2025) 16 g 3    polyethylene glycol (GOLYTELY) 236 g suspension The night before the exam at 6 pm drink an 8-ounce glass every 15 minutes until the jug is half empty. If you arrive before 11 AM: Drink the other half of the Golytely jug at 11 PM night before procedure. If you arrive after 11 AM: Drink the other half of the Golytely jug at 6 AM day of procedure. For additional instructions refer to your colonoscopy prep instructions. (Patient not taking: Reported on 3/25/2025) 4000 mL 0     No current facility-administered medications for this visit.        Past Medical History:   Patient Active Problem List   Diagnosis    Seasonal allergic rhinitis    Major depressive disorder, recurrent episode, mild    Family history of colon cancer    Acoustic neuroma (H)     Past Medical History:   Diagnosis Date    Allergic rhinitis     Chemical dependency (H) 10/14/2009    Conductive hearing loss     Depressive disorder     Hearing problem        CC Rudi Young MD  [address] [place] [address] on close of this encounter.

## 2025-03-25 NOTE — LETTER
3/25/2025       RE: Donn Baptiste  [address]  [place] [address]     Dear Colleague,    Thank you for referring your patient, Donn Baptiste, to the [facility] DERMATOLOGY CLINIC [place] at [facility]. Please see a copy of my visit note below.    [facility] Dermatology Note  Encounter Date: Mar 25, 2025  Office Visit     Dermatology Problem List:  1. Malignant melanoma, Breslow depth 0.4mm, L upper back; s/p Bx 1/30/2020; s/p excision 3/2/20     ____________________________________________    Assessment & Plan:     #  Hx Melanoma  No evidence of recurrent disease  - ABCDEs of melanoma were reviewed    # Benign skin lesions: solar lentigines, seborrheic keratoses, cherry angiomas, clinically banal nevi  # Actinic skin damage  - Discussed benign nature of these lesions  - No treatment necessary      Procedures Performed:   None    Follow-up: 1 year(s) in-person, or earlier for new or changing lesions    Staff and Resident:  ILorenzo MD, discussed and evaluated the patient with Dr. Young.    ____________________________________________    CC: Skin Check (Donn is here today for a skin check; Hx of melanoma)    HPI:  Mr. Donn Baptiste is a(n) 59 year old male who presents today as a return patient for skin check. Last seen in derm clinic 3/2024 with no concerning findings at that visit. No new, painful, growing, tender lesions of concern today.     Labs Reviewed:  N/A    Physical Exam:  Vitals: There were no vitals taken for this visit.  SKIN: Full skin, which includes the head/face, both arms, chest, back, abdomen,both legs, genitalia and/or groin buttocks, digits and/or nails, was examined.  - Tan-brown evenly pigmented macules on sun exposed areas  - Bright red dome shaped papules on trunk  - Tan and brown waxy stuck on papules on trunk  - No cervical, clavicular or axillary LAD  - No other  lesions of concern on areas examined.     Medications:  Current Outpatient Medications   Medication Sig Dispense Refill     IBUPROFEN PO        sertraline (ZOLOFT) 50 MG tablet TAKE 1 TABLET BY MOUTH EVERY DAY 90 tablet 3     bisacodyl (DULCOLAX) 5 MG EC tablet Take 2 tablets at 3 pm the day before your procedure. If your procedure is before 11 am, take 2 additional tablets at 11 pm. If your procedure is after 11 am, take 2 additional tablets at 6 am. For additional instructions refer to your colonoscopy prep instructions. (Patient not taking: Reported on 3/25/2025) 4 tablet 0     cetirizine (ZYRTEC) 10 MG tablet Take 1 tablet (10 mg) by mouth daily (Patient not taking: Reported on 3/25/2025) 90 tablet 3     fluticasone (FLONASE) 50 MCG/ACT spray Spray 1-2 sprays into both nostrils daily (Patient not taking: Reported on 3/25/2025) 16 g 3     polyethylene glycol (GOLYTELY) 236 g suspension The night before the exam at 6 pm drink an 8-ounce glass every 15 minutes until the jug is half empty. If you arrive before 11 AM: Drink the other half of the Golytely jug at 11 PM night before procedure. If you arrive after 11 AM: Drink the other half of the Golytely jug at 6 AM day of procedure. For additional instructions refer to your colonoscopy prep instructions. (Patient not taking: Reported on 3/25/2025) 4000 mL 0     No current facility-administered medications for this visit.        Past Medical History:   Patient Active Problem List   Diagnosis     Seasonal allergic rhinitis     Major depressive disorder, recurrent episode, mild     Family history of colon cancer     Acoustic neuroma (H)     Past Medical History:   Diagnosis Date     Allergic rhinitis      Chemical dependency (H) 10/14/2009     Conductive hearing loss      Depressive disorder      Hearing problem        CC Rdui Young MD  [address] [place] [address] on close of this encounter.      Attestation with edits by Rudi Young MD at 3/25/2025   3:12 PM:  I have personally examined this patient and agree with the resident doctor's documentation and plan of care. I have reviewed and amended the resident's note. The documentation accurately reflects my clinical observations, diagnoses, treatment and follow-up plans.     Rudi Young MD  Dermatology Staff      Again, thank you for allowing me to participate in the care of your patient.      Sincerely,    Rudi Young MD

## 2025-03-25 NOTE — NURSING NOTE
Dermatology Rooming Note    Dnon Baptiste's goals for this visit include:   Chief Complaint   Patient presents with    Skin Check     Donn is here today for a skin check; Hx of melanoma     Montrell Jeronimo, Visit Facilitator

## 2025-03-26 ENCOUNTER — TELEPHONE (OUTPATIENT)
Dept: DERMATOLOGY | Facility: CLINIC | Age: 60
End: 2025-03-26
Payer: COMMERCIAL

## 2025-03-26 NOTE — TELEPHONE ENCOUNTER
3/26 Patient confirmed scheduled appointment:  Date: 3/26/26  Time: 2:30pm  Visit type: Return Dermatology  Provider: Hector  Location: [facility]  Testing/imaging: na  Additional notes: annual follow up

## 2025-05-24 ENCOUNTER — APPOINTMENT (OUTPATIENT)
Dept: CT IMAGING | Facility: CLINIC | Age: 60
End: 2025-05-24
Attending: STUDENT IN AN ORGANIZED HEALTH CARE EDUCATION/TRAINING PROGRAM
Payer: COMMERCIAL

## 2025-05-24 ENCOUNTER — HOSPITAL ENCOUNTER (EMERGENCY)
Facility: CLINIC | Age: 60
Discharge: HOME | End: 2025-05-24
Attending: STUDENT IN AN ORGANIZED HEALTH CARE EDUCATION/TRAINING PROGRAM | Admitting: STUDENT IN AN ORGANIZED HEALTH CARE EDUCATION/TRAINING PROGRAM
Payer: COMMERCIAL

## 2025-05-24 ENCOUNTER — OFFICE VISIT (OUTPATIENT)
Dept: URGENT CARE | Facility: URGENT CARE | Age: 60
End: 2025-05-24
Payer: COMMERCIAL

## 2025-05-24 VITALS
RESPIRATION RATE: 12 BRPM | DIASTOLIC BLOOD PRESSURE: 77 MMHG | WEIGHT: 164 LBS | OXYGEN SATURATION: 98 % | SYSTOLIC BLOOD PRESSURE: 112 MMHG | HEART RATE: 73 BPM | TEMPERATURE: 98.2 F | BODY MASS INDEX: 25.69 KG/M2

## 2025-05-24 VITALS
DIASTOLIC BLOOD PRESSURE: 90 MMHG | OXYGEN SATURATION: 96 % | BODY MASS INDEX: 25.9 KG/M2 | RESPIRATION RATE: 18 BRPM | WEIGHT: 165 LBS | HEIGHT: 67 IN | TEMPERATURE: 97.7 F | SYSTOLIC BLOOD PRESSURE: 111 MMHG | HEART RATE: 62 BPM

## 2025-05-24 DIAGNOSIS — Z87.442 HISTORY OF NEPHROLITHIASIS: ICD-10-CM

## 2025-05-24 DIAGNOSIS — R10.84 ABDOMINAL PAIN, GENERALIZED: Primary | ICD-10-CM

## 2025-05-24 DIAGNOSIS — M54.9 ACUTE LEFT-SIDED BACK PAIN, UNSPECIFIED BACK LOCATION: ICD-10-CM

## 2025-05-24 DIAGNOSIS — N20.0 KIDNEY STONE ON LEFT SIDE: ICD-10-CM

## 2025-05-24 DIAGNOSIS — R10.9 LEFT FLANK PAIN: ICD-10-CM

## 2025-05-24 DIAGNOSIS — N20.1 URETEROLITHIASIS: Primary | ICD-10-CM

## 2025-05-24 DIAGNOSIS — N17.9 ACUTE KIDNEY INJURY: ICD-10-CM

## 2025-05-24 LAB
ALBUMIN UR-MCNC: NEGATIVE MG/DL
ANION GAP SERPL CALCULATED.3IONS-SCNC: 9 MMOL/L (ref 7–15)
APPEARANCE UR: CLEAR
BASOPHILS # BLD AUTO: 0.1 10E3/UL (ref 0–0.2)
BASOPHILS NFR BLD AUTO: 1 %
BILIRUB UR QL STRIP: NEGATIVE
BUN SERPL-MCNC: 19.8 MG/DL (ref 8–23)
CALCIUM SERPL-MCNC: 9.3 MG/DL (ref 8.8–10.4)
CHLORIDE SERPL-SCNC: 106 MMOL/L (ref 98–107)
COLOR UR AUTO: YELLOW
CREAT SERPL-MCNC: 1.29 MG/DL (ref 0.67–1.17)
EGFRCR SERPLBLD CKD-EPI 2021: 63 ML/MIN/1.73M2
EOSINOPHIL # BLD AUTO: 0.2 10E3/UL (ref 0–0.7)
EOSINOPHIL NFR BLD AUTO: 2 %
ERYTHROCYTE [DISTWIDTH] IN BLOOD BY AUTOMATED COUNT: 13.2 % (ref 10–15)
GLUCOSE SERPL-MCNC: 112 MG/DL (ref 70–99)
GLUCOSE UR STRIP-MCNC: NEGATIVE MG/DL
HCO3 SERPL-SCNC: 25 MMOL/L (ref 22–29)
HCT VFR BLD AUTO: 43.3 % (ref 40–53)
HGB BLD-MCNC: 15.4 G/DL (ref 13.3–17.7)
HGB UR QL STRIP: NEGATIVE
IMM GRANULOCYTES # BLD: 0 10E3/UL
IMM GRANULOCYTES NFR BLD: 0 %
KETONES UR STRIP-MCNC: NEGATIVE MG/DL
LEUKOCYTE ESTERASE UR QL STRIP: NEGATIVE
LYMPHOCYTES # BLD AUTO: 1.9 10E3/UL (ref 0.8–5.3)
LYMPHOCYTES NFR BLD AUTO: 20 %
MCH RBC QN AUTO: 30 PG (ref 26.5–33)
MCHC RBC AUTO-ENTMCNC: 35.6 G/DL (ref 31.5–36.5)
MCV RBC AUTO: 84 FL (ref 78–100)
MONOCYTES # BLD AUTO: 1.1 10E3/UL (ref 0–1.3)
MONOCYTES NFR BLD AUTO: 12 %
NEUTROPHILS # BLD AUTO: 6.1 10E3/UL (ref 1.6–8.3)
NEUTROPHILS NFR BLD AUTO: 65 %
NITRATE UR QL: NEGATIVE
NRBC # BLD AUTO: 0 10E3/UL
NRBC BLD AUTO-RTO: 0 /100
PH UR STRIP: 6 [PH] (ref 5–7)
PLATELET # BLD AUTO: 271 10E3/UL (ref 150–450)
POTASSIUM SERPL-SCNC: 4.2 MMOL/L (ref 3.4–5.3)
RBC # BLD AUTO: 5.13 10E6/UL (ref 4.4–5.9)
SODIUM SERPL-SCNC: 140 MMOL/L (ref 135–145)
SP GR UR STRIP: 1.02 (ref 1–1.03)
UROBILINOGEN UR STRIP-ACNC: 0.2 E.U./DL
WBC # BLD AUTO: 9.3 10E3/UL (ref 4–11)

## 2025-05-24 PROCEDURE — 81003 URINALYSIS AUTO W/O SCOPE: CPT

## 2025-05-24 PROCEDURE — 99213 OFFICE O/P EST LOW 20 MIN: CPT | Performed by: FAMILY MEDICINE

## 2025-05-24 PROCEDURE — 36415 COLL VENOUS BLD VENIPUNCTURE: CPT | Performed by: STUDENT IN AN ORGANIZED HEALTH CARE EDUCATION/TRAINING PROGRAM

## 2025-05-24 PROCEDURE — 250N000009 HC RX 250: Performed by: STUDENT IN AN ORGANIZED HEALTH CARE EDUCATION/TRAINING PROGRAM

## 2025-05-24 PROCEDURE — 96374 THER/PROPH/DIAG INJ IV PUSH: CPT | Mod: 59

## 2025-05-24 PROCEDURE — 99285 EMERGENCY DEPT VISIT HI MDM: CPT | Mod: 25

## 2025-05-24 PROCEDURE — 82310 ASSAY OF CALCIUM: CPT | Performed by: STUDENT IN AN ORGANIZED HEALTH CARE EDUCATION/TRAINING PROGRAM

## 2025-05-24 PROCEDURE — 3074F SYST BP LT 130 MM HG: CPT | Performed by: FAMILY MEDICINE

## 2025-05-24 PROCEDURE — 3078F DIAST BP <80 MM HG: CPT | Performed by: FAMILY MEDICINE

## 2025-05-24 PROCEDURE — 250N000011 HC RX IP 250 OP 636: Performed by: STUDENT IN AN ORGANIZED HEALTH CARE EDUCATION/TRAINING PROGRAM

## 2025-05-24 PROCEDURE — 74177 CT ABD & PELVIS W/CONTRAST: CPT

## 2025-05-24 PROCEDURE — 85048 AUTOMATED LEUKOCYTE COUNT: CPT | Performed by: STUDENT IN AN ORGANIZED HEALTH CARE EDUCATION/TRAINING PROGRAM

## 2025-05-24 PROCEDURE — 96375 TX/PRO/DX INJ NEW DRUG ADDON: CPT

## 2025-05-24 RX ORDER — OXYCODONE HYDROCHLORIDE 5 MG/1
5 TABLET ORAL EVERY 6 HOURS PRN
Qty: 6 TABLET | Refills: 0 | Status: SHIPPED | OUTPATIENT
Start: 2025-05-24 | End: 2025-05-27

## 2025-05-24 RX ORDER — TAMSULOSIN HYDROCHLORIDE 0.4 MG/1
0.4 CAPSULE ORAL DAILY
Qty: 10 CAPSULE | Refills: 0 | Status: SHIPPED | OUTPATIENT
Start: 2025-05-24 | End: 2025-06-03

## 2025-05-24 RX ORDER — ONDANSETRON 4 MG/1
4 TABLET, ORALLY DISINTEGRATING ORAL EVERY 8 HOURS PRN
Qty: 10 TABLET | Refills: 0 | Status: SHIPPED | OUTPATIENT
Start: 2025-05-24 | End: 2025-05-27

## 2025-05-24 RX ORDER — KETOROLAC TROMETHAMINE 10 MG/1
10 TABLET, FILM COATED ORAL EVERY 6 HOURS PRN
Qty: 20 TABLET | Refills: 0 | Status: SHIPPED | OUTPATIENT
Start: 2025-05-24

## 2025-05-24 RX ORDER — IOPAMIDOL 755 MG/ML
86 INJECTION, SOLUTION INTRAVASCULAR ONCE
Status: COMPLETED | OUTPATIENT
Start: 2025-05-24 | End: 2025-05-24

## 2025-05-24 RX ORDER — HYDROMORPHONE HYDROCHLORIDE 1 MG/ML
0.5 INJECTION, SOLUTION INTRAMUSCULAR; INTRAVENOUS; SUBCUTANEOUS EVERY 30 MIN PRN
Refills: 0 | Status: DISCONTINUED | OUTPATIENT
Start: 2025-05-24 | End: 2025-05-24 | Stop reason: HOSPADM

## 2025-05-24 RX ORDER — ONDANSETRON 2 MG/ML
4 INJECTION INTRAMUSCULAR; INTRAVENOUS EVERY 30 MIN PRN
Status: DISCONTINUED | OUTPATIENT
Start: 2025-05-24 | End: 2025-05-24 | Stop reason: HOSPADM

## 2025-05-24 RX ADMIN — IOPAMIDOL 86 ML: 755 INJECTION, SOLUTION INTRAVENOUS at 17:27

## 2025-05-24 RX ADMIN — SODIUM CHLORIDE 67 ML: 9 INJECTION, SOLUTION INTRAVENOUS at 17:27

## 2025-05-24 RX ADMIN — ONDANSETRON 4 MG: 2 INJECTION, SOLUTION INTRAMUSCULAR; INTRAVENOUS at 17:35

## 2025-05-24 RX ADMIN — HYDROMORPHONE HYDROCHLORIDE 0.5 MG: 1 INJECTION, SOLUTION INTRAMUSCULAR; INTRAVENOUS; SUBCUTANEOUS at 17:36

## 2025-05-24 ASSESSMENT — ACTIVITIES OF DAILY LIVING (ADL)
ADLS_ACUITY_SCORE: 41
ADLS_ACUITY_SCORE: 41

## 2025-05-24 ASSESSMENT — COLUMBIA-SUICIDE SEVERITY RATING SCALE - C-SSRS
1. IN THE PAST MONTH, HAVE YOU WISHED YOU WERE DEAD OR WISHED YOU COULD GO TO SLEEP AND NOT WAKE UP?: NO
2. HAVE YOU ACTUALLY HAD ANY THOUGHTS OF KILLING YOURSELF IN THE PAST MONTH?: NO
6. HAVE YOU EVER DONE ANYTHING, STARTED TO DO ANYTHING, OR PREPARED TO DO ANYTHING TO END YOUR LIFE?: NO

## 2025-05-24 NOTE — PATIENT INSTRUCTIONS
Follow up with primary care and consider CT abdomen, pelvis.    Consider Urology referral.    To ER for worsening or severe pain, fever.

## 2025-05-24 NOTE — ED TRIAGE NOTES
Pt presents with 9/10 left flank pain that started at 5 AM this morning. Pt took oxycodone (5 mg) x2 and ibuprofen x4 PTA, with minimal relief. Pt with hx of left kidney stone, that passed without intervention 1 year ago.     Pt denies blood in urine or stool.      Triage Assessment (Adult)       Row Name 05/24/25 [id number]          Triage Assessment    Airway WDL WDL        Respiratory WDL    Respiratory WDL WDL;all     Rhythm/Pattern, Respiratory unlabored;pattern regular;depth regular;no shortness of breath reported        Skin Circulation/Temperature WDL    Skin Circulation/Temperature WDL WDL        Cardiac WDL    Cardiac WDL WDL  no CP, pt well-perfused        Peripheral/Neurovascular WDL    Peripheral Neurovascular WDL WDL        Cognitive/Neuro/Behavioral WDL    Cognitive/Neuro/Behavioral WDL WDL

## 2025-05-24 NOTE — DISCHARGE INSTRUCTIONS
Thank you for allowing us to evaluate you today.  Follow up with urology in 1 week for reevaluation. I placed a referral for you.   Urinate through the urine strainer to try to catch the kidney stone.  For pain, use acetaminophen (Tylenol) and Toradol. Do not take Toradol with ibuprofen or naproxen  Take the oxycodone as prescribed for breakthrough severe pain.   Take the ondansetron as prescribed for nausea.   Take the Flomax daily as prescribed.  Please read the guidance provided with your discharge instructions.  Immediately return to the emergency department with any concerns.

## 2025-05-24 NOTE — ED PROVIDER NOTES
Emergency Department Note      History of Present Illness     Chief Complaint   Flank Pain      HPI   Donn Baptiste is a very pleasant 59 year old male with history of nephrolithiasis presenting with left flank pain.  He has had some intermittent left-sided low back pain for the last week.  This morning, he developed significant left-sided flank pain.  He also feels some pain in the left lower abdomen.  A couple of weeks ago, he did notice some hematuria briefly, but did not have pain.  Today, he has not noticed hematuria.  He has been drinking lots of water.  His urine appears clear.  No fever or chills.  No nausea or vomiting.  No diarrhea.  This feels like the last time he had a kidney stone.  Patient did present to the urgent care today and was assessed, with plan for primary care follow-up.  Unfortunately, the patient tried taking oxycodone and ibuprofen earlier, which did help the pain, but then it came back even worse.  This resulted in him presenting to the emergency department.  Urinalysis was obtained at urgent care and was unremarkable.    Independent Historian   Wife as detailed above.    Review of External Notes   I personally reviewed notes from the patient's emergency department visit  dated 3/12/2024. This provided me with information regarding outside emergency department visit when patient was found to have nephrolithiasis.     Past Medical History     Medical History and Problem List   Past Medical History:   Diagnosis Date    Allergic rhinitis     Chemical dependency (H) 10/14/2009    Conductive hearing loss     Depressive disorder     Hearing problem    Nephrolithiasis    Medications   ketorolac (TORADOL) 10 MG tablet  oxyCODONE (ROXICODONE) 5 MG tablet  tamsulosin (FLOMAX) 0.4 MG capsule  bisacodyl (DULCOLAX) 5 MG EC tablet  cetirizine (ZYRTEC) 10 MG tablet  fluticasone (FLONASE) 50 MCG/ACT spray  IBUPROFEN PO  polyethylene glycol (GOLYTELY) 236 g suspension  sertraline (ZOLOFT) 50 MG  "tablet        Surgical History   Past Surgical History:   Procedure Laterality Date    COLONOSCOPY N/A 11/28/2022    Procedure: COLONOSCOPY, WITH POLYPECTOMY;  Surgeon: Alexus Cote MD;  [place] [facility] [place]    IMPLANT BAHA Right 4/26/2019    Procedure: Ossteointegrated Implant with sound processor with the cochlear device-RIGHT;  Surgeon: Nat Camarena MD;  Location:  OR       Physical Exam     Patient Vitals for the past 24 hrs:   BP Temp Temp src Pulse Resp SpO2 Height Weight   05/24/25 1815 107/76 -- -- 72 -- 92 % -- --   05/24/25 1800 114/82 -- -- 64 -- 93 % -- --   05/24/25 1745 110/77 -- -- 67 -- 92 % -- --   05/24/25 1733 -- -- -- -- 18 99 % -- --   05/24/25 1732 (!) 124/92 -- -- 67 -- -- -- --   05/24/25 1645 121/81 97.7  F (36.5  C) Oral 73 18 98 % 1.702 m (5' 7\") 74.8 kg (165 lb)     Physical Exam  Vitals and nursing note reviewed.   Constitutional:       General: He is in acute distress.      Appearance: Normal appearance. He is not ill-appearing or diaphoretic.   Eyes:      General: No scleral icterus.     Conjunctiva/sclera: Conjunctivae normal.   Cardiovascular:      Rate and Rhythm: Normal rate and regular rhythm.   Pulmonary:      Effort: Pulmonary effort is normal.   Abdominal:      General: Abdomen is flat. There is no distension.      Palpations: Abdomen is soft.      Tenderness: There is no abdominal tenderness. There is no right CVA tenderness, left CVA tenderness, guarding or rebound.   Skin:     General: Skin is warm and dry.      Coloration: Skin is not jaundiced or pale.   Neurological:      Mental Status: He is alert and oriented to person, place, and time.         Diagnostics     Lab Results   Labs Ordered and Resulted from Time of ED Arrival to Time of ED Departure   BASIC METABOLIC PANEL - Abnormal       Result Value    Sodium 140      Potassium 4.2      Chloride 106      Carbon Dioxide (CO2) 25      Anion Gap 9      Urea Nitrogen 19.8      Creatinine 1.29 (*)     GFR Estimate 63   "    Calcium 9.3      Glucose 112 (*)    CBC WITH PLATELETS AND DIFFERENTIAL    WBC Count 9.3      RBC Count 5.13      Hemoglobin 15.4      Hematocrit 43.3      MCV 84      MCH 30.0      MCHC 35.6      RDW 13.2      Platelet Count 271      % Neutrophils 65      % Lymphocytes 20      % Monocytes 12      % Eosinophils 2      % Basophils 1      % Immature Granulocytes 0      NRBCs per 100 WBC 0      Absolute Neutrophils 6.1      Absolute Lymphocytes 1.9      Absolute Monocytes 1.1      Absolute Eosinophils 0.2      Absolute Basophils 0.1      Absolute Immature Granulocytes 0.0      Absolute NRBCs 0.0         Imaging   CT Abdomen Pelvis w Contrast   Final Result   IMPRESSION:    1.  A 5.2 mm obstructive stone in the proximal left ureter causing mild to moderate ureteral pyelocaliectasis.      2.  Benign cysts in the liver with no followup recommended.      3.  Mild diverticulosis with no evidence for diverticulitis.      4.  Bilateral fatty inguinal hernias with no associated bowel or inflammation.      5.  Since 04/09/2024 the prior seen nonobstructing stone in the lower pole of the left kidney is now in the left ureter causing obstruction.          EKG   No ECG performed.     Independent Interpretation   None    ED Course      Medications Administered   Medications   sodium chloride (PF) 0.9% PF flush 3 mL (has no administration in time range)   sodium chloride (PF) 0.9% PF flush 3 mL (has no administration in time range)   HYDROmorphone (PF) (DILAUDID) injection 0.5 mg (0.5 mg Intravenous $Given 5/24/25 1736)   ondansetron (ZOFRAN) injection 4 mg (4 mg Intravenous $Given 5/24/25 1735)   sodium chloride 0.9 % bag for CT scan flush (67 mLs As instructed $Given 5/24/25 1727)   iopamidol (ISOVUE-370) solution 86 mL (86 mLs Intravenous $Given 5/24/25 1727)       Procedures   Procedures   None performed    Discussion of Management   None    ED Course        Additional Documentation  None    Medical Decision Making /  Diagnosis     CMS Diagnoses: None    MIPS       None    MDM   Patient presenting with left flank pain. Vital signs are reassuring. Considered differential including ureterolithiasis, diverticulitis, colitis, among others.  I reviewed urinalysis obtained at the urgent care and this was negative for hematuria or pyuria.  Workup was obtained, showing no leukocytosis, evidence of PRIMO.  Patient's renal function is normally in normal range.  I obtained a CT of the abdomen pelvis.  This showed a 5.2 mm kidney stone in the proximal left ureter, consistent with symptoms.  On reevaluation, patient was feeling better after receiving hydromorphone and ondansetron.  He feels comfortable being discharged at this time with outpatient follow-up with urology.  I placed a referral for him.  He requested Toradol prescription since this worked well previously.  I prescribed this along with oxycodone for severe pain and Flomax.  Return precautions were provided for signs of infection or intractable pain.  Instructions were provided.  Questions were answered.    Disposition   The patient was discharged.     Diagnosis     ICD-10-CM    1. Ureterolithiasis  N20.1 Adult Urology  Referral      2. Acute kidney injury  N17.9       3. Left flank pain  R10.9       4. History of nephrolithiasis  Z87.442            Discharge Medications   New Prescriptions    KETOROLAC (TORADOL) 10 MG TABLET    Take 1 tablet (10 mg) by mouth every 6 hours as needed for moderate pain.    OXYCODONE (ROXICODONE) 5 MG TABLET    Take 1 tablet (5 mg) by mouth every 6 hours as needed for severe pain.    TAMSULOSIN (FLOMAX) 0.4 MG CAPSULE    Take 1 capsule (0.4 mg) by mouth daily for 10 doses.        Sachin Arroyo MD  05/24/25 5523

## 2025-05-24 NOTE — ED NOTES
"Patient reported his pain as \"much better.\" Pt and spouse given discharge instructions. All questions answered.  "

## 2025-05-24 NOTE — PROGRESS NOTES
Urgent Care Clinic Visi  Chief Complaint   Patient presents with    Urinary Problem     X 1week  Reports think has kidney stone  Denied fevers or UTI symptoms       Abdominal Pain     X 1 weeks on and off  All 4 quadrants     Back Pain     X 1 day   Left lower back               5/24/2025    11:48 AM   Additional Questions   Roomed by danyelle osei   Accompanied by friend     Pre-Provider Visit Orders- Urinalysis UA/UC  Patient reports the following symptoms:  difficulty with urination   Does the patient report any of the following symptoms: vaginal discharge, vaginal itching, possible yeast infection, has a urinary catheter in place, or unable to void in a specimen cup?  No              (R10.84) Abdominal pain, generalized  (primary encounter diagnosis)  Comment:   Bilateral lower and somewhat generalized abdominal pain, currently with benign abdominal exam.  Normal UA.  Plan: UA Macroscopic with reflex to Microscopic and         Culture - Clinic Collect            (M54.9) Acute left-sided back pain, unspecified back location  Comment:   No significant fever or CVAT.  Plan:       (N20.0) Kidney stone on left side  Comment:   History of previous kidney stone noted 3-2024.  7 mm stone in left kidney noted at that time.  Plan:        Discussion:    See history and diagnosis for details.  At this time patient is comfortable and abdominal exam is benign.  He is afebrile.  It is possible that the 7 mm kidney stone slipped out into the renal pelvis.  He may have even passed a small stone a couple of weeks ago.  At this time, emergency care does not appear to be indicated since he is comfortable and we will attempt to have follow-up on an outpatient basis.  He will be contacting his primary and consider a CT.  If he redevelops severe pain or other concerning symptoms as discussed, he is to present to an ER.      CHIEF COMPLAINT    Abdominal pain and left back pain.      HISTORY    This 59-year-old man who has a history  of a kidney stone developed abdominal pain this morning.  He feels it was level 7 out of 10 in intensity.  Location of the pain was across the lower abdomen and to some extent the upper abdomen and was not lateralizing.  He did have some left mid back pain.  He had some oxycodone and took 1 of those.  Pain has now subsided and he is comfortable.  Patient Active Problem List   Diagnosis    Seasonal allergic rhinitis    Major depressive disorder, recurrent episode, mild    Family history of colon cancer    Acoustic neuroma (H)       Current Outpatient Medications   Medication Sig Dispense Refill    sertraline (ZOLOFT) 50 MG tablet TAKE 1 TABLET BY MOUTH EVERY DAY 90 tablet 3    bisacodyl (DULCOLAX) 5 MG EC tablet Take 2 tablets at 3 pm the day before your procedure. If your procedure is before 11 am, take 2 additional tablets at 11 pm. If your procedure is after 11 am, take 2 additional tablets at 6 am. For additional instructions refer to your colonoscopy prep instructions. (Patient not taking: Reported on 3/25/2025) 4 tablet 0    cetirizine (ZYRTEC) 10 MG tablet Take 1 tablet (10 mg) by mouth daily (Patient not taking: Reported on 3/25/2025) 90 tablet 3    fluticasone (FLONASE) 50 MCG/ACT spray Spray 1-2 sprays into both nostrils daily (Patient not taking: Reported on 3/25/2025) 16 g 3    IBUPROFEN PO       polyethylene glycol (GOLYTELY) 236 g suspension The night before the exam at 6 pm drink an 8-ounce glass every 15 minutes until the jug is half empty. If you arrive before 11 AM: Drink the other half of the Golytely jug at 11 PM night before procedure. If you arrive after 11 AM: Drink the other half of the Golytely jug at 6 AM day of procedure. For additional instructions refer to your colonoscopy prep instructions. (Patient not taking: Reported on 3/25/2025) 4000 mL 0         REVIEW OF SYSTEMS    No fever or chills.  No congestion or cough or SOB.  No chest pain.  No dysuria or hematuria.  No urinary  frequency.  No rashes.      EXAM  /77   Pulse 73   Temp 98.2  F (36.8  C) (Temporal)   Resp 12   Wt 74.4 kg (164 lb)   SpO2 98%   BMI 25.69 kg/m      Patient is alert, comfortable.  HEENT unremarkable.  Neck without mass.  Nonlabored breathing.  Cardiac regular with normal rate.  Abdomen is nondistended, no palpable focal area of tenderness or guarding.  No significant CVAT.  Extremities without edema.      Results for orders placed or performed in visit on 05/24/25   UA Macroscopic with reflex to Microscopic and Culture - Clinic Collect     Status: Normal    Specimen: Urine, Midstream   Result Value Ref Range    Color Urine Yellow Colorless, Straw, Light Yellow, Yellow    Appearance Urine Clear Clear    Glucose Urine Negative Negative mg/dL    Bilirubin Urine Negative Negative    Ketones Urine Negative Negative mg/dL    Specific Gravity Urine 1.020 1.003 - 1.035    Blood Urine Negative Negative    pH Urine 6.0 5.0 - 7.0    Protein Albumin Urine Negative Negative mg/dL    Urobilinogen Urine 0.2 0.2, 1.0 E.U./dL    Nitrite Urine Negative Negative    Leukocyte Esterase Urine Negative Negative    Narrative    Microscopic not indicated

## 2025-05-24 NOTE — ED NOTES
Pt resting in bed with eyes closed, appears to be sleeping, respirations even and unlabored on RA. Family member at bedside.

## 2025-06-22 NOTE — PATIENT INSTRUCTIONS
Problem: Discharge Planning  Goal: Discharge to home or other facility with appropriate resources  Outcome: Progressing     Problem: Pain  Goal: Verbalizes/displays adequate comfort level or baseline comfort level  Outcome: Progressing     Problem: Safety - Adult  Goal: Free from fall injury  Outcome: Progressing     Problem: Cardiovascular - Adult  Goal: Maintains optimal cardiac output and hemodynamic stability  Outcome: Progressing  Goal: Absence of cardiac dysrhythmias or at baseline  Outcome: Progressing     Problem: Metabolic/Fluid and Electrolytes - Adult  Goal: Electrolytes maintained within normal limits  Outcome: Progressing      Follow up immediately with numbness, cold sensation    Follow up with ortho early this week for casting       Patient Education     Elbow Fracture    You have a break, or fracture, of the elbow. That means you have a crack or break in one or more of the bones of the elbow joint. Fractures usually take 4 to 12 weeks to heal, depending on the type. Initial treatment is with a splint or cast. Severe fractures may need surgery to put the bone fragments back into place.  The elbow joint is formed by 3 arm bones:    Radius. This is the bone on the thumb side of the forearm.    Ulna. This is the bone on the little-finger side of the forearm. The ulna forms the tip of the elbow.    Humerus. This is the upper arm bone that connects to the shoulder.  Home care    Keep your arm raised to reduce pain and swelling. When sitting or lying down raise your arm above heart level. You can do this by placing your arm on a pillow that rests on your chest or on a pillow at your side. This is most important during the first 48 hours after injury.    Apply an ice pack over the injured area for 15 to 20 minutes every 3 to 6 hours. You should do this for the first 24 to 48 hours. To make an ice pack, put ice cubes in a plastic bag that seals at the top. Wrap the bag in a clean, thin towel or cloth. Never put ice or an ice pack directly on your skin. You can place the ice pack inside the sling and directly over the splint. Keep using ice packs to ease pain and swelling as needed. As the ice melts, be careful to not get your wrap, splint, or cast wet. After 48 hours, apply heat (warm shower or warm bath) for 15 to 20 minutes several times a day. Or switch between ice and heat.    If you were given a sling and splint, leave it in place for the time advised. Keep the splint completely dry at all times. Bathe with your splint out of the water protected with 2 large plastic bags, one outside of the other, each sealed with duct tape or rubber bands  at the top end. If a fiberglass splint or cast gets wet, you can dry it with a hair dryer on a cool setting.    If you were given a sling only, wear it for the first week. Unless told otherwise, slowly begin range of motion exercises, after the first week, or as advised by your healthcare provider.    You may use over-the-counter pain medicine to control pain, unless another pain medicine was prescribed. If you have chronic liver or kidney disease or ever had a stomach ulcer or GI (gastrointestinal) bleeding, talk with your healthcare provider about using these medicines.  Follow-up care  Follow up with your healthcare provider, or as advised.  An elbow joint will become stiff if kept still (immobile) for too long. Ask your healthcare provider when to begin range of motion exercises to prevent the elbow from getting stiff. If X-rays were taken, you will be told if there are any new findings that may affect your care.  When to seek medical advice  Call your healthcare provider right away if any of these occur:    The plaster splint or cast becomes wet or soft    The splint or cast becomes loose    The fiberglass splint or cast remains wet for more than 24 hours    Increased tightness or pain develops in the elbow    A bad smell comes from the cast or splint    Fingers become swollen, cold, blue, numb, or tingly  Date Last Reviewed: 4/1/2018 2000-2018 [employer]. [address] [place] [address], [place] [address]. All rights reserved. This information is not intended as a substitute for professional medical care. Always follow your healthcare professional's instructions.

## 2025-08-11 SDOH — HEALTH STABILITY: PHYSICAL HEALTH: ON AVERAGE, HOW MANY DAYS PER WEEK DO YOU ENGAGE IN MODERATE TO STRENUOUS EXERCISE (LIKE A BRISK WALK)?: 3 DAYS

## 2025-08-11 SDOH — HEALTH STABILITY: PHYSICAL HEALTH: ON AVERAGE, HOW MANY MINUTES DO YOU ENGAGE IN EXERCISE AT THIS LEVEL?: 30 MIN

## 2025-08-11 ASSESSMENT — PATIENT HEALTH QUESTIONNAIRE - PHQ9
SUM OF ALL RESPONSES TO PHQ QUESTIONS 1-9: 6
SUM OF ALL RESPONSES TO PHQ QUESTIONS 1-9: 6
10. IF YOU CHECKED OFF ANY PROBLEMS, HOW DIFFICULT HAVE THESE PROBLEMS MADE IT FOR YOU TO DO YOUR WORK, TAKE CARE OF THINGS AT HOME, OR GET ALONG WITH OTHER PEOPLE: SOMEWHAT DIFFICULT

## 2025-08-11 ASSESSMENT — SOCIAL DETERMINANTS OF HEALTH (SDOH): HOW OFTEN DO YOU GET TOGETHER WITH FRIENDS OR RELATIVES?: TWICE A WEEK

## 2025-08-12 ENCOUNTER — OFFICE VISIT (OUTPATIENT)
Dept: FAMILY MEDICINE | Facility: CLINIC | Age: 60
End: 2025-08-12
Payer: COMMERCIAL

## 2025-08-12 VITALS
TEMPERATURE: 97.9 F | HEIGHT: 67 IN | HEART RATE: 67 BPM | RESPIRATION RATE: 14 BRPM | SYSTOLIC BLOOD PRESSURE: 119 MMHG | DIASTOLIC BLOOD PRESSURE: 84 MMHG | BODY MASS INDEX: 25.91 KG/M2 | WEIGHT: 165.1 LBS | OXYGEN SATURATION: 98 %

## 2025-08-12 DIAGNOSIS — Z00.00 ROUTINE GENERAL MEDICAL EXAMINATION AT A HEALTH CARE FACILITY: ICD-10-CM

## 2025-08-12 DIAGNOSIS — Z13.31 SCREENING FOR DEPRESSION: ICD-10-CM

## 2025-08-12 DIAGNOSIS — Z13.1 SCREENING FOR DIABETES MELLITUS: ICD-10-CM

## 2025-08-12 DIAGNOSIS — Z87.891 PERSONAL HISTORY OF TOBACCO USE: ICD-10-CM

## 2025-08-12 DIAGNOSIS — Z11.3 ROUTINE SCREENING FOR STI (SEXUALLY TRANSMITTED INFECTION): ICD-10-CM

## 2025-08-12 DIAGNOSIS — Z13.6 SCREENING FOR CARDIOVASCULAR CONDITION: Primary | ICD-10-CM

## 2025-08-12 DIAGNOSIS — Z12.11 ENCOUNTER FOR SCREENING COLONOSCOPY: ICD-10-CM

## 2025-08-12 LAB
ANION GAP SERPL CALCULATED.3IONS-SCNC: 12 MMOL/L (ref 7–15)
BUN SERPL-MCNC: 11.4 MG/DL (ref 8–23)
CALCIUM SERPL-MCNC: 9.4 MG/DL (ref 8.8–10.4)
CHLORIDE SERPL-SCNC: 104 MMOL/L (ref 98–107)
CHOLEST SERPL-MCNC: 186 MG/DL
CREAT SERPL-MCNC: 0.85 MG/DL (ref 0.67–1.17)
EGFRCR SERPLBLD CKD-EPI 2021: >90 ML/MIN/1.73M2
EST. AVERAGE GLUCOSE BLD GHB EST-MCNC: 111 MG/DL
FASTING STATUS PATIENT QL REPORTED: YES
FASTING STATUS PATIENT QL REPORTED: YES
GLUCOSE SERPL-MCNC: 98 MG/DL (ref 70–99)
HBA1C MFR BLD: 5.5 % (ref 0–5.6)
HCO3 SERPL-SCNC: 25 MMOL/L (ref 22–29)
HDLC SERPL-MCNC: 56 MG/DL
LDLC SERPL CALC-MCNC: 111 MG/DL
NONHDLC SERPL-MCNC: 130 MG/DL
POTASSIUM SERPL-SCNC: 4.3 MMOL/L (ref 3.4–5.3)
SODIUM SERPL-SCNC: 141 MMOL/L (ref 135–145)
TRIGL SERPL-MCNC: 97 MG/DL
TSH SERPL DL<=0.005 MIU/L-ACNC: 1.28 UIU/ML (ref 0.3–4.2)

## 2025-08-25 ENCOUNTER — ANCILLARY PROCEDURE (OUTPATIENT)
Dept: CT IMAGING | Facility: CLINIC | Age: 60
End: 2025-08-25
Attending: FAMILY MEDICINE
Payer: COMMERCIAL

## 2025-08-25 DIAGNOSIS — Z87.891 PERSONAL HISTORY OF TOBACCO USE: ICD-10-CM

## 2025-08-25 PROCEDURE — 71271 CT THORAX LUNG CANCER SCR C-: CPT | Mod: GC | Performed by: RADIOLOGY

## (undated) DEVICE — Device

## (undated) DEVICE — PREP POVIDONE IODINE SCRUB 7.5% 120ML

## (undated) DEVICE — GLOVE PROTEXIS POWDER FREE SMT 6.5  2D72PT65X

## (undated) DEVICE — PREP POVIDONE IODINE SOLUTION 10% 120ML

## (undated) DEVICE — KIT ENDO TURNOVER/PROCEDURE CARRY-ON 101822

## (undated) DEVICE — PUNCH SKIN 5MM P550

## (undated) DEVICE — BLADE CLIPPER SGL USE 9680

## (undated) DEVICE — DRSG ALLEVYN 2X2" NON-ADH 66027643

## (undated) DEVICE — SPECIMEN CONTAINER 3OZ W/FORMALIN 59901

## (undated) DEVICE — PREP SKIN SCRUB TRAY 4461A

## (undated) DEVICE — ENDO SNARE EXACTO COLD 9MM LOOP 2.4MMX230CM 00711115

## (undated) DEVICE — DRAPE U SPLIT 74X120" 29440

## (undated) DEVICE — SOL WATER IRRIG 500ML BOTTLE 2F7113

## (undated) DEVICE — LINEN TOWEL PACK X5 5464

## (undated) DEVICE — GOWN IMPERVIOUS 2XL BLUE

## (undated) DEVICE — ESU ELEC BLADE 2.75" COATED/INSULATED E1455

## (undated) DEVICE — ESU GROUND PAD ADULT W/CORD E7507

## (undated) DEVICE — TUBING SUCTION 12"X1/4" N612

## (undated) DEVICE — ENDO TRAP POLYP E-TRAP 00711099

## (undated) DEVICE — SUCTION MANIFOLD NEPTUNE 2 SYS 4 PORT 0702-020-000

## (undated) DEVICE — SNARE CAPIVATOR ROUND COLD SNR BX10 M00561101

## (undated) DEVICE — SOL NACL 0.9% IRRIG 1000ML BOTTLE 2F7124

## (undated) DEVICE — NDL 25GA 1.5" 305127

## (undated) DEVICE — SUCTION MANIFOLD NEPTUNE 2 SYS 1 PORT 702-025-000

## (undated) DEVICE — PACK EAR CUSTOM ASC

## (undated) RX ORDER — PROPOFOL 10 MG/ML
INJECTION, EMULSION INTRAVENOUS
Status: DISPENSED
Start: 2019-04-26

## (undated) RX ORDER — OXYCODONE HYDROCHLORIDE 5 MG/1
TABLET ORAL
Status: DISPENSED
Start: 2019-04-26

## (undated) RX ORDER — ACETAMINOPHEN 325 MG/1
TABLET ORAL
Status: DISPENSED
Start: 2019-04-26

## (undated) RX ORDER — FENTANYL CITRATE 50 UG/ML
INJECTION, SOLUTION INTRAMUSCULAR; INTRAVENOUS
Status: DISPENSED
Start: 2022-11-28

## (undated) RX ORDER — LIDOCAINE HYDROCHLORIDE 20 MG/ML
INJECTION, SOLUTION EPIDURAL; INFILTRATION; INTRACAUDAL; PERINEURAL
Status: DISPENSED
Start: 2019-04-26

## (undated) RX ORDER — ONDANSETRON 2 MG/ML
INJECTION INTRAMUSCULAR; INTRAVENOUS
Status: DISPENSED
Start: 2019-04-26

## (undated) RX ORDER — ONDANSETRON 2 MG/ML
INJECTION INTRAMUSCULAR; INTRAVENOUS
Status: DISPENSED
Start: 2022-11-28

## (undated) RX ORDER — DIPHENHYDRAMINE HYDROCHLORIDE 50 MG/ML
INJECTION INTRAMUSCULAR; INTRAVENOUS
Status: DISPENSED
Start: 2022-11-28

## (undated) RX ORDER — CLINDAMYCIN PHOSPHATE 900 MG/50ML
INJECTION, SOLUTION INTRAVENOUS
Status: DISPENSED
Start: 2019-04-26

## (undated) RX ORDER — FENTANYL CITRATE 50 UG/ML
INJECTION, SOLUTION INTRAMUSCULAR; INTRAVENOUS
Status: DISPENSED
Start: 2019-04-26